# Patient Record
Sex: FEMALE | Race: WHITE | NOT HISPANIC OR LATINO
[De-identification: names, ages, dates, MRNs, and addresses within clinical notes are randomized per-mention and may not be internally consistent; named-entity substitution may affect disease eponyms.]

---

## 2019-03-26 PROBLEM — Z00.00 ENCOUNTER FOR PREVENTIVE HEALTH EXAMINATION: Status: ACTIVE | Noted: 2019-03-26

## 2019-04-09 ENCOUNTER — APPOINTMENT (OUTPATIENT)
Dept: INTERNAL MEDICINE | Facility: CLINIC | Age: 24
End: 2019-04-09
Payer: COMMERCIAL

## 2019-04-09 VITALS
DIASTOLIC BLOOD PRESSURE: 80 MMHG | SYSTOLIC BLOOD PRESSURE: 110 MMHG | OXYGEN SATURATION: 98 % | HEIGHT: 63 IN | HEART RATE: 86 BPM | WEIGHT: 190 LBS | BODY MASS INDEX: 33.66 KG/M2 | TEMPERATURE: 98.6 F | RESPIRATION RATE: 14 BRPM

## 2019-04-09 DIAGNOSIS — Z87.19 PERSONAL HISTORY OF OTHER DISEASES OF THE DIGESTIVE SYSTEM: ICD-10-CM

## 2019-04-09 DIAGNOSIS — Z86.59 PERSONAL HISTORY OF OTHER MENTAL AND BEHAVIORAL DISORDERS: ICD-10-CM

## 2019-04-09 PROCEDURE — 99204 OFFICE O/P NEW MOD 45 MIN: CPT

## 2019-04-09 NOTE — REVIEW OF SYSTEMS
[Insomnia] : insomnia [Suicidal] : not suicidal [Anxiety] : anxiety [Depression] : depression [Negative] : Heme/Lymph

## 2019-04-09 NOTE — PHYSICAL EXAM
[No Acute Distress] : no acute distress [Normal Sclera/Conjunctiva] : normal sclera/conjunctiva [Well-Appearing] : well-appearing [Well Developed] : well developed [Well Nourished] : well nourished [PERRL] : pupils equal round and reactive to light [EOMI] : extraocular movements intact [Normal Outer Ear/Nose] : the outer ears and nose were normal in appearance [Normal Oropharynx] : the oropharynx was normal [No JVD] : no jugular venous distention [Supple] : supple [No Respiratory Distress] : no respiratory distress  [No Lymphadenopathy] : no lymphadenopathy [Thyroid Normal, No Nodules] : the thyroid was normal and there were no nodules present [Clear to Auscultation] : lungs were clear to auscultation bilaterally [No Accessory Muscle Use] : no accessory muscle use [Normal Rate] : normal rate  [Regular Rhythm] : with a regular rhythm [Normal S1, S2] : normal S1 and S2 [No Murmur] : no murmur heard [No Carotid Bruits] : no carotid bruits [No Abdominal Bruit] : a ~M bruit was not heard ~T in the abdomen [No Varicosities] : no varicosities [No Edema] : there was no peripheral edema [Pedal Pulses Present] : the pedal pulses are present [No Extremity Clubbing/Cyanosis] : no extremity clubbing/cyanosis [Soft] : abdomen soft [No Palpable Aorta] : no palpable aorta [Non Tender] : non-tender [Non-distended] : non-distended [No Masses] : no abdominal mass palpated [Normal Bowel Sounds] : normal bowel sounds [No HSM] : no HSM [No CVA Tenderness] : no CVA  tenderness [Normal Anterior Cervical Nodes] : no anterior cervical lymphadenopathy [Normal Posterior Cervical Nodes] : no posterior cervical lymphadenopathy [Grossly Normal Strength/Tone] : grossly normal strength/tone [No Joint Swelling] : no joint swelling [No Spinal Tenderness] : no spinal tenderness [No Rash] : no rash [Normal Gait] : normal gait [Coordination Grossly Intact] : coordination grossly intact [Deep Tendon Reflexes (DTR)] : deep tendon reflexes were 2+ and symmetric [No Focal Deficits] : no focal deficits [Normal Insight/Judgement] : insight and judgment were intact [Normal Affect] : the affect was normal

## 2019-04-09 NOTE — HEALTH RISK ASSESSMENT
[Fair] : ~his/her~ current health as fair  [1] : 1) Little interest or pleasure doing things for several days (1)

## 2019-04-10 ENCOUNTER — APPOINTMENT (OUTPATIENT)
Dept: INTERNAL MEDICINE | Facility: CLINIC | Age: 24
End: 2019-04-10

## 2019-04-11 LAB
ALBUMIN SERPL ELPH-MCNC: 4.9 G/DL
ALP BLD-CCNC: 53 U/L
ALT SERPL-CCNC: 9 U/L
ANION GAP SERPL CALC-SCNC: 11 MMOL/L
AST SERPL-CCNC: 14 U/L
BASOPHILS # BLD AUTO: 0.04 K/UL
BASOPHILS NFR BLD AUTO: 0.4 %
BILIRUB SERPL-MCNC: 0.4 MG/DL
BUN SERPL-MCNC: 15 MG/DL
CALCIUM SERPL-MCNC: 10.1 MG/DL
CHLORIDE SERPL-SCNC: 103 MMOL/L
CO2 SERPL-SCNC: 26 MMOL/L
CREAT SERPL-MCNC: 0.79 MG/DL
EOSINOPHIL # BLD AUTO: 0.08 K/UL
EOSINOPHIL NFR BLD AUTO: 0.7 %
GLUCOSE SERPL-MCNC: 85 MG/DL
HCT VFR BLD CALC: 44.1 %
HGB BLD-MCNC: 14.3 G/DL
IMM GRANULOCYTES NFR BLD AUTO: 0.2 %
LYMPHOCYTES # BLD AUTO: 1.9 K/UL
LYMPHOCYTES NFR BLD AUTO: 17.7 %
MAN DIFF?: NORMAL
MCHC RBC-ENTMCNC: 31.3 PG
MCHC RBC-ENTMCNC: 32.4 GM/DL
MCV RBC AUTO: 96.5 FL
MONOCYTES # BLD AUTO: 0.58 K/UL
MONOCYTES NFR BLD AUTO: 5.4 %
NEUTROPHILS # BLD AUTO: 8.11 K/UL
NEUTROPHILS NFR BLD AUTO: 75.6 %
PLATELET # BLD AUTO: 373 K/UL
POTASSIUM SERPL-SCNC: 4.7 MMOL/L
PROT SERPL-MCNC: 7.2 G/DL
RBC # BLD: 4.57 M/UL
RBC # FLD: 12.5 %
SODIUM SERPL-SCNC: 140 MMOL/L
TSH SERPL-ACNC: 1.03 UIU/ML
WBC # FLD AUTO: 10.73 K/UL

## 2019-04-22 ENCOUNTER — APPOINTMENT (OUTPATIENT)
Dept: INTERNAL MEDICINE | Facility: CLINIC | Age: 24
End: 2019-04-22

## 2019-05-07 ENCOUNTER — APPOINTMENT (OUTPATIENT)
Dept: INTERNAL MEDICINE | Facility: CLINIC | Age: 24
End: 2019-05-07

## 2019-05-21 ENCOUNTER — APPOINTMENT (OUTPATIENT)
Dept: INTERNAL MEDICINE | Facility: CLINIC | Age: 24
End: 2019-05-21

## 2019-06-05 ENCOUNTER — APPOINTMENT (OUTPATIENT)
Dept: ORTHOPEDIC SURGERY | Facility: CLINIC | Age: 24
End: 2019-06-05
Payer: COMMERCIAL

## 2019-06-05 VITALS
WEIGHT: 185 LBS | SYSTOLIC BLOOD PRESSURE: 109 MMHG | HEART RATE: 82 BPM | HEIGHT: 63 IN | BODY MASS INDEX: 32.78 KG/M2 | DIASTOLIC BLOOD PRESSURE: 72 MMHG

## 2019-06-05 DIAGNOSIS — Z87.39 PERSONAL HISTORY OF OTHER DISEASES OF THE MUSCULOSKELETAL SYSTEM AND CONNECTIVE TISSUE: ICD-10-CM

## 2019-06-05 DIAGNOSIS — Z72.3 LACK OF PHYSICAL EXERCISE: ICD-10-CM

## 2019-06-05 DIAGNOSIS — Z80.7 FAMILY HISTORY OF OTHER MALIGNANT NEOPLASMS OF LYMPHOID, HEMATOPOIETIC AND RELATED TISSUES: ICD-10-CM

## 2019-06-05 PROCEDURE — 99203 OFFICE O/P NEW LOW 30 MIN: CPT

## 2019-06-05 RX ORDER — OXYCODONE HYDROCHLORIDE AND ACETAMINOPHEN 10; 325 MG/1; MG/1
TABLET ORAL
Refills: 0 | Status: ACTIVE | COMMUNITY

## 2019-08-21 ENCOUNTER — APPOINTMENT (OUTPATIENT)
Dept: INTERNAL MEDICINE | Facility: CLINIC | Age: 24
End: 2019-08-21
Payer: COMMERCIAL

## 2019-08-21 VITALS — DIASTOLIC BLOOD PRESSURE: 80 MMHG | SYSTOLIC BLOOD PRESSURE: 122 MMHG

## 2019-08-21 VITALS
SYSTOLIC BLOOD PRESSURE: 140 MMHG | WEIGHT: 195 LBS | OXYGEN SATURATION: 97 % | HEART RATE: 75 BPM | TEMPERATURE: 98.5 F | DIASTOLIC BLOOD PRESSURE: 100 MMHG

## 2019-08-21 DIAGNOSIS — Z01.818 ENCOUNTER FOR OTHER PREPROCEDURAL EXAMINATION: ICD-10-CM

## 2019-08-21 PROCEDURE — 99214 OFFICE O/P EST MOD 30 MIN: CPT

## 2019-08-21 NOTE — PHYSICAL EXAM
[No Acute Distress] : no acute distress [Well Nourished] : well nourished [Well Developed] : well developed [Well-Appearing] : well-appearing [PERRL] : pupils equal round and reactive to light [Normal Sclera/Conjunctiva] : normal sclera/conjunctiva [EOMI] : extraocular movements intact [Normal Outer Ear/Nose] : the outer ears and nose were normal in appearance [Normal Oropharynx] : the oropharynx was normal [No JVD] : no jugular venous distention [No Lymphadenopathy] : no lymphadenopathy [Supple] : supple [Thyroid Normal, No Nodules] : the thyroid was normal and there were no nodules present [No Accessory Muscle Use] : no accessory muscle use [No Respiratory Distress] : no respiratory distress  [Clear to Auscultation] : lungs were clear to auscultation bilaterally [Normal Rate] : normal rate  [Regular Rhythm] : with a regular rhythm [Normal S1, S2] : normal S1 and S2 [No Murmur] : no murmur heard [No Carotid Bruits] : no carotid bruits [No Varicosities] : no varicosities [No Abdominal Bruit] : a ~M bruit was not heard ~T in the abdomen [Pedal Pulses Present] : the pedal pulses are present [No Edema] : there was no peripheral edema [No Palpable Aorta] : no palpable aorta [No Extremity Clubbing/Cyanosis] : no extremity clubbing/cyanosis [Soft] : abdomen soft [Non Tender] : non-tender [Non-distended] : non-distended [No Masses] : no abdominal mass palpated [No HSM] : no HSM [Normal Bowel Sounds] : normal bowel sounds [Normal Posterior Cervical Nodes] : no posterior cervical lymphadenopathy [Normal Anterior Cervical Nodes] : no anterior cervical lymphadenopathy [No CVA Tenderness] : no CVA  tenderness [No Spinal Tenderness] : no spinal tenderness [No Joint Swelling] : no joint swelling [Grossly Normal Strength/Tone] : grossly normal strength/tone [No Rash] : no rash [Coordination Grossly Intact] : coordination grossly intact [No Focal Deficits] : no focal deficits [Normal Gait] : normal gait [Deep Tendon Reflexes (DTR)] : deep tendon reflexes were 2+ and symmetric [Normal Affect] : the affect was normal [Normal Insight/Judgement] : insight and judgment were intact

## 2019-08-23 LAB
BASOPHILS # BLD AUTO: 0.06 K/UL
BASOPHILS NFR BLD AUTO: 0.5 %
EOSINOPHIL # BLD AUTO: 0.11 K/UL
EOSINOPHIL NFR BLD AUTO: 0.9 %
HCT VFR BLD CALC: 39 %
HGB BLD-MCNC: 13.4 G/DL
IMM GRANULOCYTES NFR BLD AUTO: 0.5 %
LYMPHOCYTES # BLD AUTO: 2.98 K/UL
LYMPHOCYTES NFR BLD AUTO: 24.4 %
MAN DIFF?: NORMAL
MCHC RBC-ENTMCNC: 31.3 PG
MCHC RBC-ENTMCNC: 34.4 GM/DL
MCV RBC AUTO: 91.1 FL
MONOCYTES # BLD AUTO: 0.59 K/UL
MONOCYTES NFR BLD AUTO: 4.8 %
NEUTROPHILS # BLD AUTO: 8.41 K/UL
NEUTROPHILS NFR BLD AUTO: 68.9 %
PLATELET # BLD AUTO: 361 K/UL
RBC # BLD: 4.28 M/UL
RBC # FLD: 12.1 %
WBC # FLD AUTO: 12.21 K/UL

## 2019-08-23 NOTE — ADDENDUM
[FreeTextEntry1] : Repeat CBC- WBC 12.2. Pt admits to receiving steroids recently for allergic rxn. WBC now improving. Patient cleared for surgery.

## 2019-08-23 NOTE — HISTORY OF PRESENT ILLNESS
[FreeTextEntry1] : Laparoscopic and diagnostic and robotic resection [FreeTextEntry2] : 8/27/19 [FreeTextEntry4] : Pt undergoing work up for Hiss- followed by cardiology. \par No chest pain palpitation sob\par Overall feels well.  [FreeTextEntry3] : Dr. Coyle

## 2019-08-23 NOTE — ASSESSMENT
[Patient Optimized for Surgery] : Patient optimized for surgery [FreeTextEntry4] : Pt with elevated WBC on preop. Admits to receiving steroids recently due to allergic reaction. \par Pt to get repeat CBC.\par Pt to get cardiac clearance

## 2019-11-07 ENCOUNTER — OUTPATIENT (OUTPATIENT)
Dept: OUTPATIENT SERVICES | Facility: HOSPITAL | Age: 24
LOS: 1 days | Discharge: HOME | End: 2019-11-07

## 2019-11-07 DIAGNOSIS — D72.89 OTHER SPECIFIED DISORDERS OF WHITE BLOOD CELLS: ICD-10-CM

## 2019-11-07 DIAGNOSIS — Z98.89 OTHER SPECIFIED POSTPROCEDURAL STATES: Chronic | ICD-10-CM

## 2019-11-12 ENCOUNTER — APPOINTMENT (OUTPATIENT)
Dept: SURGICAL ONCOLOGY | Facility: CLINIC | Age: 24
End: 2019-11-12
Payer: COMMERCIAL

## 2019-11-12 VITALS
SYSTOLIC BLOOD PRESSURE: 125 MMHG | TEMPERATURE: 98.7 F | HEIGHT: 63 IN | OXYGEN SATURATION: 99 % | BODY MASS INDEX: 36.32 KG/M2 | DIASTOLIC BLOOD PRESSURE: 91 MMHG | HEART RATE: 90 BPM | WEIGHT: 205 LBS | RESPIRATION RATE: 18 BRPM

## 2019-11-12 DIAGNOSIS — D72.829 ELEVATED WHITE BLOOD CELL COUNT, UNSPECIFIED: ICD-10-CM

## 2019-11-12 PROCEDURE — 99244 OFF/OP CNSLTJ NEW/EST MOD 40: CPT

## 2019-11-13 PROBLEM — D72.829 ELEVATED WBC COUNT: Status: ACTIVE | Noted: 2019-08-22

## 2019-11-14 NOTE — HISTORY OF PRESENT ILLNESS
[de-identified] : 24 year old woman with constellation of symptoms, ranging from back pain, joint pain, fatigue, vomiting, nausea, swelling, weight gain, to name a few.  She also states she has fibromyalgia.\par She was recently in the ED at [facility], had a CT scan, which was unremarkable but she was referred to oncologist for evaluation of swelling ?lymphadenopathy?\par She has no evidence of lymphadenopathy.\par \par

## 2019-11-14 NOTE — ASSESSMENT
[FreeTextEntry1] : 24 year old woman with wide constellation of symptoms, unexplained by any one diagnosis, however worthy of further workup.  These symptoms sound like they could be consistent with an autoimmune or endocrine etiology.\par She is currently undergoing an endocrine workup but does not have the result of her thyroid function tests.\par \par Plan:\par 1) Referral to PMD that can lead / orchestrate a full medical workup \par 2) Endocrine / Rheum input\par 3) RTO as needed

## 2019-11-25 ENCOUNTER — APPOINTMENT (OUTPATIENT)
Dept: HEMATOLOGY ONCOLOGY | Facility: CLINIC | Age: 24
End: 2019-11-25

## 2019-11-27 ENCOUNTER — APPOINTMENT (OUTPATIENT)
Dept: PULMONOLOGY | Facility: CLINIC | Age: 24
End: 2019-11-27
Payer: COMMERCIAL

## 2019-11-27 VITALS
HEIGHT: 60.5 IN | DIASTOLIC BLOOD PRESSURE: 78 MMHG | OXYGEN SATURATION: 98 % | WEIGHT: 210 LBS | SYSTOLIC BLOOD PRESSURE: 130 MMHG | HEART RATE: 99 BPM | BODY MASS INDEX: 40.16 KG/M2

## 2019-11-27 DIAGNOSIS — R53.83 OTHER FATIGUE: ICD-10-CM

## 2019-11-27 DIAGNOSIS — J98.11 ATELECTASIS: ICD-10-CM

## 2019-11-27 DIAGNOSIS — Z86.2 PERSONAL HISTORY OF DISEASES OF THE BLOOD AND BLOOD-FORMING ORGANS AND CERTAIN DISORDERS INVOLVING THE IMMUNE MECHANISM: ICD-10-CM

## 2019-11-27 DIAGNOSIS — Z82.49 FAMILY HISTORY OF ISCHEMIC HEART DISEASE AND OTHER DISEASES OF THE CIRCULATORY SYSTEM: ICD-10-CM

## 2019-11-27 DIAGNOSIS — R93.89 ABNORMAL FINDINGS ON DIAGNOSTIC IMAGING OF OTHER SPECIFIED BODY STRUCTURES: ICD-10-CM

## 2019-11-27 DIAGNOSIS — Z87.42 PERSONAL HISTORY OF OTHER DISEASES OF THE FEMALE GENITAL TRACT: ICD-10-CM

## 2019-11-27 DIAGNOSIS — G47.33 OBSTRUCTIVE SLEEP APNEA (ADULT) (PEDIATRIC): ICD-10-CM

## 2019-11-27 DIAGNOSIS — E66.01 MORBID (SEVERE) OBESITY DUE TO EXCESS CALORIES: ICD-10-CM

## 2019-11-27 PROCEDURE — 85018 HEMOGLOBIN: CPT | Mod: QW

## 2019-11-27 PROCEDURE — 94727 GAS DIL/WSHOT DETER LNG VOL: CPT

## 2019-11-27 PROCEDURE — 99406 BEHAV CHNG SMOKING 3-10 MIN: CPT

## 2019-11-27 PROCEDURE — 94010 BREATHING CAPACITY TEST: CPT

## 2019-11-27 PROCEDURE — 94729 DIFFUSING CAPACITY: CPT

## 2019-11-27 PROCEDURE — 99205 OFFICE O/P NEW HI 60 MIN: CPT | Mod: 25

## 2019-11-27 NOTE — CONSULT LETTER
[Dear  ___] : Dear  [unfilled], [Consult Letter:] : I had the pleasure of evaluating your patient, [unfilled]. [Please see my note below.] : Please see my note below. [Consult Closing:] : Thank you very much for allowing me to participate in the care of this patient.  If you have any questions, please do not hesitate to contact me. [Sincerely,] : Sincerely, [FreeTextEntry3] : Roberth Wang MD, FCCP, D. ABSM\par Pulmonary and Sleep Medicine\par [facility] [place]

## 2019-11-27 NOTE — REVIEW OF SYSTEMS
[Nasal Congestion] : nasal congestion [Postnasal Drip] : postnasal drip [Sinus Problems] : sinus problems [Palpitations] : palpitations [Reflux] : reflux [Nausea] : nausea [Anxiety] : anxiety [As Noted in HPI] : as noted in HPI [Fever] : no fever [Chills] : no chills [Dry Eyes] : no dryness of the eyes [Eye Irritation] : no ~T irritation of the eyes [Epistaxis] : no nosebleeds [Cough] : no cough [Sputum] : not coughing up ~M sputum [Dyspnea] : no dyspnea [Chest Tightness] : no chest tightness [Pleuritic Pain] : no pleuritic pain [Wheezing] : no wheezing [Hypertension] : no ~T hypertension [Chest Discomfort] : no chest discomfort [Dysrhythmia] : no dysrhythmia [Murmurs] : no murmurs were heard [Edema] : ~T edema was not present [Hay Fever] : no hay fever [Itchy Eyes] : no itching of ~T the eyes [Vomiting] : no vomiting [Constipation] : no constipation [Diarrhea] : no diarrhea [Abdominal Pain] : no abdominal pain [Dysuria] : no dysuria [Trauma] : no ~T physical trauma [Fracture] : no fracture [Anemia] : no anemia [Headache] : no headache [Dizziness] : no dizziness [Syncope] : no fainting [Numbness] : no numbness [Paralysis] : no paralysis was seen [Seizures] : no seizures [Depression] : no depression [Diabetes] : no diabetes mellitus [Thyroid Problem] : no thyroid problem

## 2019-11-27 NOTE — HISTORY OF PRESENT ILLNESS
[Excess Weight] : excess weight [Dyspnea] : dyspnea [Knee Pain] : knee pain [Back Pain] : back pain [Joint Pain] : joint pain [Low Calorie Diet] : low calorie diet [Fair Compliance] : fair compliance with treatment [Fair Tolerance] : fair tolerance of treatment [Poor Symptom Control] : poor symptom control [Dyslipidemia] : dyslipidemia [High] : high [Low Calorie] : low calorie [Well Balanced Diet] : well balanced meals [Initial Evaluation] : an initial evaluation of [Excessive Daytime Sleepiness] : excessive daytime sleepiness [Snoring] : snoring [Unrefreshing Sleep] : unrefreshing sleep [Sleepy When Sedentary] : sleepy when sedentary [Currently Experiencing] : The patient is currently experiencing symptoms. [None] : The patient is not currently being treated for this problem [FreeTextEntry1] : Pt with h/o GERD and is followed with GI but is not on medication.\par She is smoker of 3-4 cigarettes daily since 2013 but continues with marijuana use and was "vaping" but no longer. I spent > 3 min discussing the risks of smoking and the benefits and therapy for smoking cessation including nicotine replacement, medications, and programs.\par She presents with SOBOE and atypical chest discomfort. She also reports significant anxiety.\par She has a h/o endometriosis and has had a 60 lb weight gain over the past 7 months, even before uterine surgery. [On ___] : performed on [unfilled] [Patient] : the patient [To Assess ___] : to assess [unfilled] [FreeTextEntry9] : Abd CT [FreeTextEntry8] : ? mild atelectasis [FreeTextEntry2] : SOBOE

## 2019-11-27 NOTE — REASON FOR VISIT
[Initial Evaluation] : an initial evaluation [Abnormal CT Scan] : abnormal CT Scan [Shortness of Breath] : shortness of Breath [FreeTextEntry2] : morbid obesity, atelectasis, smoking hx

## 2019-11-27 NOTE — DISCUSSION/SUMMARY
[FreeTextEntry1] : \par #1. PFTs performed today are essentially normal.\par #2. The patient does not appear to require chronic BD therapy at this time\par #3. SOBOE is likely related to weight or deconditioning given normal PFTs\par #4. Diet and exercise for weight loss\par #5. PSG to evaluate for possible NIKKI\par #6. Chest CT to evaluate possible bronchiectasis\par #7. If CT and PSG are unremarkable, no further pulm/sleep w/u would be required\par #8. Control of anxiety would be helpful\par #9. F/u after PSG and with chest CT

## 2020-02-24 ENCOUNTER — TRANSCRIPTION ENCOUNTER (OUTPATIENT)
Age: 25
End: 2020-02-24

## 2020-03-22 ENCOUNTER — TRANSCRIPTION ENCOUNTER (OUTPATIENT)
Age: 25
End: 2020-03-22

## 2020-04-12 ENCOUNTER — TRANSCRIPTION ENCOUNTER (OUTPATIENT)
Age: 25
End: 2020-04-12

## 2020-05-28 ENCOUNTER — FORM ENCOUNTER (OUTPATIENT)
Age: 25
End: 2020-05-28

## 2020-06-01 ENCOUNTER — TRANSCRIPTION ENCOUNTER (OUTPATIENT)
Age: 25
End: 2020-06-01

## 2020-06-03 ENCOUNTER — TRANSCRIPTION ENCOUNTER (OUTPATIENT)
Age: 25
End: 2020-06-03

## 2020-06-09 ENCOUNTER — APPOINTMENT (OUTPATIENT)
Dept: ORTHOPEDIC SURGERY | Facility: CLINIC | Age: 25
End: 2020-06-09
Payer: COMMERCIAL

## 2020-06-09 VITALS
WEIGHT: 220 LBS | SYSTOLIC BLOOD PRESSURE: 145 MMHG | DIASTOLIC BLOOD PRESSURE: 93 MMHG | BODY MASS INDEX: 43.19 KG/M2 | HEART RATE: 93 BPM | HEIGHT: 60 IN

## 2020-06-09 DIAGNOSIS — M25.852 OTHER SPECIFIED JOINT DISORDERS, LEFT HIP: ICD-10-CM

## 2020-06-09 DIAGNOSIS — S73.192A OTHER SPRAIN OF LEFT HIP, INITIAL ENCOUNTER: ICD-10-CM

## 2020-06-09 PROCEDURE — 99213 OFFICE O/P EST LOW 20 MIN: CPT

## 2020-06-09 PROCEDURE — 73502 X-RAY EXAM HIP UNI 2-3 VIEWS: CPT | Mod: LT

## 2020-06-09 NOTE — DISCUSSION/SUMMARY
[de-identified] : 24-year-old obese female smoker with low back pain with radicular symptoms she does have some pain with hip motion but it is more likely that her pain is coming from her back and her hip. I recommended diagnostic intra-articular injection to differentiate her hip from her back pain. She will follow up after injection. All questions were answered.

## 2020-06-09 NOTE — HISTORY OF PRESENT ILLNESS
[de-identified] : 25yo obese female returns complaining of chronic left hip and leg pain. This is gradually worsening over the last couple of months. Past medical history of fibromyalgia, multiple lumbar disc herniations. She states she has been on opioids therapy for the last 2 years for her lower back. She is being treated by Dr. Velazco, has had epidurals with some mild temporary relief.  She has pain that radiates down her entire left leg to her foot with numbness and tingling. She states she also has localized pain to the lateral anterior aspect worse with ambulation. She's not sure if this is related to her lower back or her hip. She had MRI at [facility].  \par \par The patient's past medical history, past surgical history, medications, allergies, and social history were reviewed by me today with the patient and documented accordingly. In addition, the patient's family history, which is noncontributory to this visit, was also reviewed.

## 2020-06-09 NOTE — PHYSICAL EXAM
[de-identified] : General Exam\par \par Well developed, well nourished\par No apparent distress\par Oriented to person, place, and time\par Mood: Normal\par Affect: Normal\par Balance and coordination: Normal\par Gait: Normal\par \par Left hip exam\par \par Skin: Clean/dry and intact\par Inspection: No obvious deformity, no swelling, no ecchymosis.\par Tenderness: + tenderness over greater trochanter/glut medius insertion. No tenderness pubic symphysis, pubic tubercle, hip flexors. No ttp ischial tuberosity or buttock. No ttp over the ASIS/Illiac crest.\par ROM: 0-120°. Internal rotation 30 external rotation 70\par Painful ROM: None\par Additional tests: No pain with circumduction negative impingement test at 90° +positive impingement test at 60° +Noel negative [facility]\par Strength: 5/5 hip flexion/ADD/ABD/Q/H/TA/GS/EHL\par Neuro: Sensation in tact to light touch throughout in dp/sp/tib/nasra/saph distributions\par Pulses: 2+ DP/PT pulses\par  [de-identified] : \par The following radiographs were ordered and read by me during this patients visit. I reviewed each radiograph in detail with the patient and discussed the findings as highlighted below. \par AP pelvis AP lateral left hip were obtained today. Joint spaces are well maintained. There is a small osseous convexity of the femoral head neck junction\par \par MRI was reviewed. Small cam deformity labral fraying\par \par

## 2020-07-10 ENCOUNTER — TRANSCRIPTION ENCOUNTER (OUTPATIENT)
Age: 25
End: 2020-07-10

## 2020-08-15 ENCOUNTER — TRANSCRIPTION ENCOUNTER (OUTPATIENT)
Age: 25
End: 2020-08-15

## 2020-09-04 ENCOUNTER — APPOINTMENT (OUTPATIENT)
Dept: SPINE | Facility: CLINIC | Age: 25
End: 2020-09-04
Payer: COMMERCIAL

## 2020-09-04 ENCOUNTER — INPATIENT (INPATIENT)
Facility: HOSPITAL | Age: 25
LOS: 5 days | Discharge: OTHER INSTITUTION | DRG: 552 | End: 2020-09-10
Attending: INTERNAL MEDICINE | Admitting: INTERNAL MEDICINE
Payer: COMMERCIAL

## 2020-09-04 VITALS
RESPIRATION RATE: 20 BRPM | TEMPERATURE: 99 F | OXYGEN SATURATION: 98 % | DIASTOLIC BLOOD PRESSURE: 86 MMHG | SYSTOLIC BLOOD PRESSURE: 123 MMHG | WEIGHT: 214.95 LBS | HEART RATE: 92 BPM | HEIGHT: 62 IN

## 2020-09-04 VITALS
SYSTOLIC BLOOD PRESSURE: 128 MMHG | HEIGHT: 62 IN | BODY MASS INDEX: 39.56 KG/M2 | HEART RATE: 80 BPM | WEIGHT: 215 LBS | DIASTOLIC BLOOD PRESSURE: 85 MMHG | TEMPERATURE: 97.3 F | OXYGEN SATURATION: 97 %

## 2020-09-04 DIAGNOSIS — Z78.9 OTHER SPECIFIED HEALTH STATUS: ICD-10-CM

## 2020-09-04 DIAGNOSIS — F17.200 NICOTINE DEPENDENCE, UNSPECIFIED, UNCOMPLICATED: ICD-10-CM

## 2020-09-04 DIAGNOSIS — K21.9 GASTRO-ESOPHAGEAL REFLUX DISEASE WITHOUT ESOPHAGITIS: ICD-10-CM

## 2020-09-04 DIAGNOSIS — F19.90 OTHER PSYCHOACTIVE SUBSTANCE USE, UNSPECIFIED, UNCOMPLICATED: ICD-10-CM

## 2020-09-04 DIAGNOSIS — Z98.89 OTHER SPECIFIED POSTPROCEDURAL STATES: Chronic | ICD-10-CM

## 2020-09-04 DIAGNOSIS — F17.210 NICOTINE DEPENDENCE, CIGARETTES, UNCOMPLICATED: ICD-10-CM

## 2020-09-04 DIAGNOSIS — Z87.891 PERSONAL HISTORY OF NICOTINE DEPENDENCE: ICD-10-CM

## 2020-09-04 DIAGNOSIS — Z87.898 PERSONAL HISTORY OF OTHER SPECIFIED CONDITIONS: ICD-10-CM

## 2020-09-04 DIAGNOSIS — M54.5 LOW BACK PAIN: ICD-10-CM

## 2020-09-04 LAB
ALBUMIN SERPL ELPH-MCNC: 4.7 G/DL — SIGNIFICANT CHANGE UP (ref 3.3–5)
ALP SERPL-CCNC: 56 U/L — SIGNIFICANT CHANGE UP (ref 40–120)
ALT FLD-CCNC: 15 U/L — SIGNIFICANT CHANGE UP (ref 10–45)
ANION GAP SERPL CALC-SCNC: 14 MMOL/L — SIGNIFICANT CHANGE UP (ref 5–17)
APPEARANCE UR: ABNORMAL
APTT BLD: 38 SEC — HIGH (ref 27.5–35.5)
AST SERPL-CCNC: 21 U/L — SIGNIFICANT CHANGE UP (ref 10–40)
BACTERIA # UR AUTO: ABNORMAL
BASOPHILS # BLD AUTO: 0.04 K/UL — SIGNIFICANT CHANGE UP (ref 0–0.2)
BASOPHILS NFR BLD AUTO: 0.4 % — SIGNIFICANT CHANGE UP (ref 0–2)
BILIRUB SERPL-MCNC: 0.4 MG/DL — SIGNIFICANT CHANGE UP (ref 0.2–1.2)
BILIRUB UR-MCNC: NEGATIVE — SIGNIFICANT CHANGE UP
BLD GP AB SCN SERPL QL: NEGATIVE — SIGNIFICANT CHANGE UP
BUN SERPL-MCNC: 14 MG/DL — SIGNIFICANT CHANGE UP (ref 7–23)
CALCIUM SERPL-MCNC: 10.3 MG/DL — SIGNIFICANT CHANGE UP (ref 8.4–10.5)
CHLORIDE SERPL-SCNC: 100 MMOL/L — SIGNIFICANT CHANGE UP (ref 96–108)
CO2 SERPL-SCNC: 22 MMOL/L — SIGNIFICANT CHANGE UP (ref 22–31)
COLOR SPEC: SIGNIFICANT CHANGE UP
CREAT SERPL-MCNC: 0.55 MG/DL — SIGNIFICANT CHANGE UP (ref 0.5–1.3)
DIFF PNL FLD: ABNORMAL
EOSINOPHIL # BLD AUTO: 0.07 K/UL — SIGNIFICANT CHANGE UP (ref 0–0.5)
EOSINOPHIL NFR BLD AUTO: 0.7 % — SIGNIFICANT CHANGE UP (ref 0–6)
EPI CELLS # UR: 17 — SIGNIFICANT CHANGE UP
GLUCOSE SERPL-MCNC: 88 MG/DL — SIGNIFICANT CHANGE UP (ref 70–99)
GLUCOSE UR QL: NEGATIVE — SIGNIFICANT CHANGE UP
HCG SERPL-ACNC: <2 MIU/ML — SIGNIFICANT CHANGE UP
HCT VFR BLD CALC: 40.7 % — SIGNIFICANT CHANGE UP (ref 34.5–45)
HGB BLD-MCNC: 13.8 G/DL — SIGNIFICANT CHANGE UP (ref 11.5–15.5)
HYALINE CASTS # UR AUTO: 1 /LPF — SIGNIFICANT CHANGE UP (ref 0–7)
IMM GRANULOCYTES NFR BLD AUTO: 0.4 % — SIGNIFICANT CHANGE UP (ref 0–1.5)
INR BLD: 0.96 RATIO — SIGNIFICANT CHANGE UP (ref 0.88–1.16)
KETONES UR-MCNC: NEGATIVE — SIGNIFICANT CHANGE UP
LEUKOCYTE ESTERASE UR-ACNC: ABNORMAL
LYMPHOCYTES # BLD AUTO: 1.96 K/UL — SIGNIFICANT CHANGE UP (ref 1–3.3)
LYMPHOCYTES # BLD AUTO: 20.7 % — SIGNIFICANT CHANGE UP (ref 13–44)
MCHC RBC-ENTMCNC: 30.9 PG — SIGNIFICANT CHANGE UP (ref 27–34)
MCHC RBC-ENTMCNC: 33.9 GM/DL — SIGNIFICANT CHANGE UP (ref 32–36)
MCV RBC AUTO: 91.3 FL — SIGNIFICANT CHANGE UP (ref 80–100)
MONOCYTES # BLD AUTO: 0.54 K/UL — SIGNIFICANT CHANGE UP (ref 0–0.9)
MONOCYTES NFR BLD AUTO: 5.7 % — SIGNIFICANT CHANGE UP (ref 2–14)
NEUTROPHILS # BLD AUTO: 6.83 K/UL — SIGNIFICANT CHANGE UP (ref 1.8–7.4)
NEUTROPHILS NFR BLD AUTO: 72.1 % — SIGNIFICANT CHANGE UP (ref 43–77)
NITRITE UR-MCNC: NEGATIVE — SIGNIFICANT CHANGE UP
NRBC # BLD: 0 /100 WBCS — SIGNIFICANT CHANGE UP (ref 0–0)
PH UR: 5.5 — SIGNIFICANT CHANGE UP (ref 5–8)
PLATELET # BLD AUTO: 325 K/UL — SIGNIFICANT CHANGE UP (ref 150–400)
POTASSIUM SERPL-MCNC: 4.5 MMOL/L — SIGNIFICANT CHANGE UP (ref 3.5–5.3)
POTASSIUM SERPL-SCNC: 4.5 MMOL/L — SIGNIFICANT CHANGE UP (ref 3.5–5.3)
PROT SERPL-MCNC: 7.6 G/DL — SIGNIFICANT CHANGE UP (ref 6–8.3)
PROT UR-MCNC: SIGNIFICANT CHANGE UP
PROTHROM AB SERPL-ACNC: 11.5 SEC — SIGNIFICANT CHANGE UP (ref 10.6–13.6)
RBC # BLD: 4.46 M/UL — SIGNIFICANT CHANGE UP (ref 3.8–5.2)
RBC # FLD: 12.2 % — SIGNIFICANT CHANGE UP (ref 10.3–14.5)
RBC CASTS # UR COMP ASSIST: 14 /HPF — HIGH (ref 0–4)
RH IG SCN BLD-IMP: POSITIVE — SIGNIFICANT CHANGE UP
SARS-COV-2 RNA SPEC QL NAA+PROBE: SIGNIFICANT CHANGE UP
SODIUM SERPL-SCNC: 136 MMOL/L — SIGNIFICANT CHANGE UP (ref 135–145)
SP GR SPEC: 1.02 — SIGNIFICANT CHANGE UP (ref 1.01–1.02)
UROBILINOGEN FLD QL: NEGATIVE — SIGNIFICANT CHANGE UP
WBC # BLD: 9.48 K/UL — SIGNIFICANT CHANGE UP (ref 3.8–10.5)
WBC # FLD AUTO: 9.48 K/UL — SIGNIFICANT CHANGE UP (ref 3.8–10.5)
WBC UR QL: 27 /HPF — HIGH (ref 0–5)

## 2020-09-04 PROCEDURE — 99203 OFFICE O/P NEW LOW 30 MIN: CPT

## 2020-09-04 PROCEDURE — 99285 EMERGENCY DEPT VISIT HI MDM: CPT

## 2020-09-04 PROCEDURE — 72148 MRI LUMBAR SPINE W/O DYE: CPT | Mod: 26

## 2020-09-04 PROCEDURE — 72131 CT LUMBAR SPINE W/O DYE: CPT | Mod: 26

## 2020-09-04 RX ORDER — GABAPENTIN 100 MG/1
CAPSULE ORAL
Refills: 0 | Status: ACTIVE | COMMUNITY

## 2020-09-04 RX ORDER — OXYCODONE HYDROCHLORIDE 5 MG/1
5 TABLET ORAL ONCE
Refills: 0 | Status: DISCONTINUED | OUTPATIENT
Start: 2020-09-04 | End: 2020-09-04

## 2020-09-04 RX ORDER — ACETAMINOPHEN 500 MG
650 TABLET ORAL ONCE
Refills: 0 | Status: COMPLETED | OUTPATIENT
Start: 2020-09-04 | End: 2020-09-04

## 2020-09-04 RX ORDER — GABAPENTIN 400 MG/1
300 CAPSULE ORAL ONCE
Refills: 0 | Status: COMPLETED | OUTPATIENT
Start: 2020-09-04 | End: 2020-09-04

## 2020-09-04 RX ORDER — HYDROMORPHONE HYDROCHLORIDE 2 MG/ML
0.5 INJECTION INTRAMUSCULAR; INTRAVENOUS; SUBCUTANEOUS ONCE
Refills: 0 | Status: DISCONTINUED | OUTPATIENT
Start: 2020-09-04 | End: 2020-09-04

## 2020-09-04 RX ORDER — MORPHINE SULFATE 50 MG/1
2 CAPSULE, EXTENDED RELEASE ORAL EVERY 4 HOURS
Refills: 0 | Status: DISCONTINUED | OUTPATIENT
Start: 2020-09-04 | End: 2020-09-05

## 2020-09-04 RX ORDER — OXYCODONE HYDROCHLORIDE 5 MG/1
10 TABLET ORAL ONCE
Refills: 0 | Status: DISCONTINUED | OUTPATIENT
Start: 2020-09-04 | End: 2020-09-04

## 2020-09-04 RX ORDER — PANTOPRAZOLE SODIUM 20 MG/1
40 TABLET, DELAYED RELEASE ORAL
Refills: 0 | Status: DISCONTINUED | OUTPATIENT
Start: 2020-09-04 | End: 2020-09-10

## 2020-09-04 RX ORDER — CYCLOBENZAPRINE HYDROCHLORIDE 10 MG/1
5 TABLET, FILM COATED ORAL ONCE
Refills: 0 | Status: COMPLETED | OUTPATIENT
Start: 2020-09-04 | End: 2020-09-04

## 2020-09-04 RX ORDER — ENOXAPARIN SODIUM 100 MG/ML
40 INJECTION SUBCUTANEOUS DAILY
Refills: 0 | Status: DISCONTINUED | OUTPATIENT
Start: 2020-09-04 | End: 2020-09-04

## 2020-09-04 RX ORDER — ALPRAZOLAM 0.25 MG
0.25 TABLET ORAL ONCE
Refills: 0 | Status: DISCONTINUED | OUTPATIENT
Start: 2020-09-04 | End: 2020-09-04

## 2020-09-04 RX ORDER — DEXAMETHASONE 0.5 MG/5ML
4 ELIXIR ORAL THREE TIMES A DAY
Refills: 0 | Status: DISCONTINUED | OUTPATIENT
Start: 2020-09-04 | End: 2020-09-05

## 2020-09-04 RX ORDER — OXYCODONE AND ACETAMINOPHEN 5; 325 MG/1; MG/1
2 TABLET ORAL EVERY 6 HOURS
Refills: 0 | Status: DISCONTINUED | OUTPATIENT
Start: 2020-09-04 | End: 2020-09-05

## 2020-09-04 RX ADMIN — OXYCODONE HYDROCHLORIDE 5 MILLIGRAM(S): 5 TABLET ORAL at 16:56

## 2020-09-04 RX ADMIN — Medication 1 MILLIGRAM(S): at 19:08

## 2020-09-04 RX ADMIN — CYCLOBENZAPRINE HYDROCHLORIDE 5 MILLIGRAM(S): 10 TABLET, FILM COATED ORAL at 17:09

## 2020-09-04 RX ADMIN — Medication 650 MILLIGRAM(S): at 13:38

## 2020-09-04 RX ADMIN — Medication 1 MILLIGRAM(S): at 15:02

## 2020-09-04 RX ADMIN — Medication 0.25 MILLIGRAM(S): at 23:01

## 2020-09-04 RX ADMIN — GABAPENTIN 300 MILLIGRAM(S): 400 CAPSULE ORAL at 17:14

## 2020-09-04 RX ADMIN — Medication 650 MILLIGRAM(S): at 16:56

## 2020-09-04 RX ADMIN — MORPHINE SULFATE 2 MILLIGRAM(S): 50 CAPSULE, EXTENDED RELEASE ORAL at 20:12

## 2020-09-04 RX ADMIN — HYDROMORPHONE HYDROCHLORIDE 0.5 MILLIGRAM(S): 2 INJECTION INTRAMUSCULAR; INTRAVENOUS; SUBCUTANEOUS at 21:36

## 2020-09-04 RX ADMIN — Medication 4 MILLIGRAM(S): at 21:39

## 2020-09-04 RX ADMIN — MORPHINE SULFATE 2 MILLIGRAM(S): 50 CAPSULE, EXTENDED RELEASE ORAL at 19:57

## 2020-09-04 RX ADMIN — HYDROMORPHONE HYDROCHLORIDE 0.5 MILLIGRAM(S): 2 INJECTION INTRAMUSCULAR; INTRAVENOUS; SUBCUTANEOUS at 21:51

## 2020-09-04 RX ADMIN — OXYCODONE HYDROCHLORIDE 5 MILLIGRAM(S): 5 TABLET ORAL at 17:09

## 2020-09-04 RX ADMIN — OXYCODONE HYDROCHLORIDE 5 MILLIGRAM(S): 5 TABLET ORAL at 13:38

## 2020-09-04 RX ADMIN — OXYCODONE HYDROCHLORIDE 10 MILLIGRAM(S): 5 TABLET ORAL at 17:50

## 2020-09-04 NOTE — PHYSICAL EXAM
[General Appearance - Alert] : alert [General Appearance - Well Nourished] : well nourished [General Appearance - In No Acute Distress] : in no acute distress [Oriented To Time, Place, And Person] : oriented to person, place, and time [General Appearance - Well Developed] : well developed [Affect] : the affect was normal [Impaired Insight] : insight and judgment were intact [Mood] : the mood was normal [Person] : oriented to person [Place] : oriented to place [Short Term Intact] : short term memory intact [Time] : oriented to time [Registration Intact] : recent registration memory intact [Remote Intact] : remote memory intact [Span Intact] : the attention span was normal [Concentration Intact] : normal concentrating ability [Visual Intact] : visual attention was ~T not ~L decreased [Comprehension] : comprehension intact [Fluency] : fluency intact [Current Events] : adequate knowledge of current events [Reading] : reading intact [Past History] : adequate knowledge of personal past history [Vocabulary] : adequate range of vocabulary [Cranial Nerves Optic (II)] : visual acuity intact bilaterally,  pupils equal round and reactive to light [Cranial Nerves Oculomotor (III)] : extraocular motion intact [Cranial Nerves Accessory (XI - Cranial And Spinal)] : head turning and shoulder shrug symmetric [Sensation Tactile Decrease] : light touch was intact [Involuntary Movements] : no involuntary movements were seen [Limited Balance] : the patient's balance was impaired [Coordination - Dysmetria Impaired Heel-to-Shin Bilateral] : present bilaterally [0] : Ankle jerk right 0 [___] : [unfilled] ~Ubeats present on the right [No Visual Abnormalities] : no visible abnormailities [Straight-Leg Raise Test - Left] : straight leg raise of the left leg was positive [Sclera] : the sclera and conjunctiva were normal [Neck Appearance] : the appearance of the neck was normal [Outer Ear] : the ears and nose were normal in appearance [] : no respiratory distress [Skin Color & Pigmentation] : normal skin color and pigmentation [___] : absent on the left [FreeTextEntry8] : Gait impaired due to pain , unable to perform heel to shin  [FreeTextEntry6] : poor effort of her strength exam and refused to have left leg examined [FreeTextEntry9] : obese  [Able to heel walk] : the patient was not able to heel walk [Straight-Leg Raise Test - Right] : straight leg raise  of the right leg was negative [Able to toe walk] : the patient was not able to toe walk

## 2020-09-04 NOTE — ED PROVIDER NOTE - PROGRESS NOTE DETAILS
Seen by NSG. Requesting MRI Lumbar spine. Spoke with Radiology, aware of order Initially went for MRI, but could not tolerate it due to muscle spasms.  She came back to the ED, she was given 1 mg of ativan and returned back to MRI.  Awaiting results and follow up with neurosurgery.  Maye Aguirre MD PEM Fellow Seen again by Nsg. Requesting dose of gabapentin, oxy and flexeril now. To staff with attending but likely plan for admission and OR **ATTENDING ADDENDUM (Dr. Felipe Edwards): informed by RN that patient is still in pain. ED course reviewed with ED team. Agree with goals/plan of ED care as described in EMR, including diagnostics, therapeutics and consultation as clinically warranted. Uncertain if narcotic analgesics will be effective in controlling patient's reported pain and anxiety. Concern over actual pain v. malingering v. pain refractory to normal and even supratherapeutic doses of analgesics re: dependency? Will independently reassess and continue to observe and monitor. PGY 1 Alverto Patel: Spoke w/ hospitalist and they accept pt for admission. PGY 1 Alverto Patel: Spoke w/ hospitalist and they accept pt for admission.  **ATTENDING ADDENDUM (Dr. Felipe Edwards): patient serially evaluated throughout ED course. NO acute deterioration up to this time in the ED. Agree with above notation by EM resident Jorge. Agree with admission for therapeutic intensity. Will continue to observe and monitor closely until definitive placement is made.

## 2020-09-04 NOTE — CONSULT NOTE ADULT - SUBJECTIVE AND OBJECTIVE BOX
Patient was seen and evaluated at bedside. Patient came in the ER after going to neurosurgeon's office. Patient states that she has history of back pain and that has been more severe over the last few weeks. She is unable to walk at this point. Patient states that she had a fall about 2 weeks ago as well. Patient states that she had refused surgery in the past.      Exam : awake alert oriented to person place and time  sensation wnl  Motor-left hf/kf/ke 3/5, df/pf 4/5   Right hf/kf/ke 4/5, df/pf 4+  uppers are wnl   no cranial nerve findings.       Imaging : MR L spine :p

## 2020-09-04 NOTE — REVIEW OF SYSTEMS
[Leg Weakness] : leg weakness [Tingling] : tingling [Numbness] : numbness [Difficulty Walking] : difficulty walking [Negative] : Heme/Lymph [de-identified] : back and leg pain

## 2020-09-04 NOTE — ED PROVIDER NOTE - CCCP TRG CHIEF CMPLNT
Full range of motion of upper and lower extremities, no joint tenderness/swelling.
back and leg pain

## 2020-09-04 NOTE — ED ADULT NURSE REASSESSMENT NOTE - NS ED NURSE REASSESS COMMENT FT1
pt returned from MRI unable to do testing because of level of pain Dr Fulton notified pending further orders

## 2020-09-04 NOTE — ED PROVIDER NOTE - CARE PLAN
Principal Discharge DX:	Acute exacerbation of chronic low back pain Principal Discharge DX:	Acute exacerbation of chronic low back pain  Goal:	**ATTENDING ADDENDUM (Dr. Felipe Edwards): Goals of care include resolution of emergent/urgent symptoms and concerns, and restoration to baseline level of homeostasis.

## 2020-09-04 NOTE — ED PROVIDER NOTE - SHIFT CHANGE DETAILS
Dispo per Neurosurgery Dispo per Neurosurgery  ***ATTENDING ADDENDUM (Dr. Felipe Edwards): I have received handoff from [facility]. Aware and Agree with goals/plan of ED care as described in EMR, including diagnostics, therapeutics and consultation as ordered. Awaiting final recommendations and disposition decision from Neurosurgical team (Arthur). Will continue to observe and monitor closely.

## 2020-09-04 NOTE — ED PROVIDER NOTE - CLINICAL SUMMARY MEDICAL DECISION MAKING FREE TEXT BOX
Chichi Mcconnell MD - Attending Physician: Pt here with acute exacerbation of her low back pain, followed by Dr Gibson, recent fall and now with L leg weakness and difficulty walking x6 days due to pain. No urinary retention/incontinence, able to stand. Pre-ops, NSG consult

## 2020-09-04 NOTE — ED ADULT NURSE REASSESSMENT NOTE - NS ED NURSE REASSESS COMMENT FT1
pt requesting pain med pt states she has not taken any med since yesterday pt states she has been on percocet7.5mg/325 TID at home states gabapentin was prescribed for her yesterday but patient has not taken any as yet  md notified pending further orders

## 2020-09-04 NOTE — H&P ADULT - NSHPLABSRESULTS_GEN_ALL_CORE
Lab Results:  CBC  CBC Full  -  ( 04 Sep 2020 13:24 )  WBC Count : 9.48 K/uL  RBC Count : 4.46 M/uL  Hemoglobin : 13.8 g/dL  Hematocrit : 40.7 %  Platelet Count - Automated : 325 K/uL  Mean Cell Volume : 91.3 fl  Mean Cell Hemoglobin : 30.9 pg  Mean Cell Hemoglobin Concentration : 33.9 gm/dL  Auto Neutrophil # : 6.83 K/uL  Auto Lymphocyte # : 1.96 K/uL  Auto Monocyte # : 0.54 K/uL  Auto Eosinophil # : 0.07 K/uL  Auto Basophil # : 0.04 K/uL  Auto Neutrophil % : 72.1 %  Auto Lymphocyte % : 20.7 %  Auto Monocyte % : 5.7 %  Auto Eosinophil % : 0.7 %  Auto Basophil % : 0.4 %    .		Differential:	[] Automated		[] Manual  Chemistry                        13.8   9.48  )-----------( 325      ( 04 Sep 2020 13:24 )             40.7     09-04    136  |  100  |  14  ----------------------------<  88  4.5   |  22  |  0.55    Ca    10.3      04 Sep 2020 13:24    TPro  7.6  /  Alb  4.7  /  TBili  0.4  /  DBili  x   /  AST  21  /  ALT  15  /  AlkPhos  56  09-04    LIVER FUNCTIONS - ( 04 Sep 2020 13:24 )  Alb: 4.7 g/dL / Pro: 7.6 g/dL / ALK PHOS: 56 U/L / ALT: 15 U/L / AST: 21 U/L / GGT: x           PT/INR - ( 04 Sep 2020 13:24 )   PT: 11.5 sec;   INR: 0.96 ratio         PTT - ( 04 Sep 2020 13:24 )  PTT:38.0 sec  Urinalysis Basic - ( 04 Sep 2020 15:29 )    Color: Light Yellow / Appearance: Slightly Turbid / S.022 / pH: x  Gluc: x / Ketone: Negative  / Bili: Negative / Urobili: Negative   Blood: x / Protein: Trace / Nitrite: Negative   Leuk Esterase: Small / RBC: 14 /hpf / WBC 27 /HPF   Sq Epi: x / Non Sq Epi: 17 / Bacteria: Moderate            MICROBIOLOGY/CULTURES:      RADIOLOGY RESULTS: reviewed

## 2020-09-04 NOTE — ED ADULT NURSE NOTE - NSIMPLEMENTINTERV_GEN_ALL_ED
Implemented All Fall Risk Interventions:  [place] to call system. Call bell, personal items and telephone within reach. Instruct patient to call for assistance. Room bathroom lighting operational. Non-slip footwear when patient is off stretcher. Physically safe environment: no spills, clutter or unnecessary equipment. Stretcher in lowest position, wheels locked, appropriate side rails in place. Provide visual cue, wrist band, yellow gown, etc. Monitor gait and stability. Monitor for mental status changes and reorient to person, place, and time. Review medications for side effects contributing to fall risk. Reinforce activity limits and safety measures with patient and family.

## 2020-09-04 NOTE — CONSULT NOTE ADULT - ASSESSMENT
A/P 26 YO female with severe back pain and leg pain     1 Awaiting MR l spine   2 prn pain meds   3 more plan to follow A/P 26 YO female with severe back pain and leg pain     1 Awaiting MR l spine   2 prn pain meds   3 may admit to medicine service for pain management   4 neuro surgery will follow up after lumbar mri   5 monitor for any acute neurological changes. JEFERSON SAM  25F pw acute worsening of low back pain w/ radiculopathy over 6 days which has made it hard for her to ambulate. Pain is 10/10. Pain radiated down the L butt down the leg, and numbness in LLE. No b/b dysfunction.   Imaging: large central L4/5 disc causing severe canal stenosis  Exam: aox3, left leg hf/kf/ke 3/5 , df/pf 3/5, right leg 4/5 throughout , uppers wnl   -pre op clearance  -PT  -pain control  -will discuss with attending operative planning

## 2020-09-04 NOTE — HISTORY OF PRESENT ILLNESS
[< 3 months] : less than 3 months [FreeTextEntry1] : Left leg pain  [de-identified] : This is a 25 year old female with a long standing history of  chronic lower back pain and leg pain who is seen today for progression of her symptoms and an acute onset of lower back pain and left leg pain since a fall from a stair case  two weeks ago.  Since the fall she has had lower back pain and increased pain of the left lower extremity.  She describes her pain at a 10/10 and burning, tingling and numbness is present.  She is having trouble ambulating, standing and reports weakness.    She has a chronic history of stress incontinence and no changes or increase in her urinary symptoms.  No fecal incontinence.  Her pain is not relieved in any position.   \par \par In the past she has seen multiple specialists for lower back and leg pain and she is managed by a  pain specialist, Dr William.  She has been taking  percocets  long term with no relief.  Last week her pain management specialist prescribed Gabapentin.  Her last  PT session was over three years ago and she is currently in too much pain to attend PT.  Chiropractor therapy was initiated two weeks ago with no  relief.  An epidural injection was given one month ago and provided no relief.  She is wincing in pain during her intake. She uses a cane for ambulation.  Last year her MRI  showed a L4 L5 narrowing with a herniated disc and compression of the right L5 nerve root, with a central disc herniation of L5 S 1 and narrowing with compression of both S 1 nerve roots.  In addition, she did mention that she had gone to an urgent care facility one week ago and had lumbar xrays and the results are unavailable.

## 2020-09-04 NOTE — REASON FOR VISIT
[New Patient Visit] : a new patient visit [Parent] : parent [FreeTextEntry1] : Chronic lumbar radiculopathy

## 2020-09-04 NOTE — H&P ADULT - ASSESSMENT
24 yo F with endometriosis, reflux, pre-HOCM, chronic back pain and left leg pain who presents with acute worsening of the pain over the past 6 days to the point where she is unable to ambulate.  She reports that over the past 6 months she has been unable to walk without a cane, but now is unable to walk at all without help.  She rates her pain as a 10/10 in the lower back and left lower extremity.  Her last MRI showed lumbar degenerative disc disease L4-5 and L5-S1 with central herniations at both levels and a moderate central and significant lateral recess stenosis.  She has experienced sciatica before, but does not think this is that type of pain.  She also reports numbness in the left leg.  She is able to urinate, but requires assistance due to her weakness.  She saw Dr. Gibson this morning who sent her to the ER from the office due to her acute progression of back pain.  She was told she would need surgery due to her disease progression and her lack of improvement with conservative management. Did not take anything for her pain today. Usually takes Oxy

## 2020-09-04 NOTE — CHART NOTE - NSCHARTNOTEFT_GEN_A_CORE
Spoke with patient and Mom bedside. Acute worsening of pre-existing L5-S1 disc herniation with new LLE radicular pain and pain limited weakness. Patient endorses PHMx endometriosis with Hx urinary retention and Hx urge incontinence. Was able to void normally in ED several hours prior.  PVR after most recent void was 29cc.  Refusing discectomy and wants pain control with second opinion re: fusion next week

## 2020-09-04 NOTE — ED PROVIDER NOTE - PLAN OF CARE
**ATTENDING ADDENDUM (Dr. Felipe Edwards): Goals of care include resolution of emergent/urgent symptoms and concerns, and restoration to baseline level of homeostasis.

## 2020-09-04 NOTE — ED ADULT NURSE REASSESSMENT NOTE - NS ED NURSE REASSESS COMMENT FT1
pt continues to cry in pain pt pending evaluation by Dr Edwards who states he will eval pt pt no disposition as yet pending neurosurg

## 2020-09-04 NOTE — H&P ADULT - NSHPPHYSICALEXAM_GEN_ALL_CORE
General: WN/WD NAD  PERRLA  Neurology: A&Ox3, nonfocal, VOSS x 4  Respiratory: CTA B/L  CV: RRR, S1S2, no murmurs, rubs or gallops  Abdominal: Soft, NT, ND +BS, Last BM  Extremities: No edema, + peripheral pulses  Skin Normal

## 2020-09-04 NOTE — ED ADULT NURSE REASSESSMENT NOTE - NS ED NURSE REASSESS COMMENT FT1
pt returned from MRI and ct scan pt crying in pain with Dr Rowland aware neuro surg resident was in er spoke with md delgadoing plan of care pending eval by Dr Gibson

## 2020-09-04 NOTE — ED ADULT NURSE REASSESSMENT NOTE - NS ED NURSE REASSESS COMMENT FT1
pt given ativan as ordered pt pending transport back to MRI via stretcher pt was able to squat over chair standing on two feet to urinate into bedpan with no distress

## 2020-09-04 NOTE — ED PROVIDER NOTE - OBJECTIVE STATEMENT
Cynthia is a 26 yo F with endometriosis, reflux, pre-HOCM, chronic back pain and left leg pain who presents with acute worsening of the pain over the past 6 days to the point where she is unable to ambulate.  She reports that over the past 6 months she has been unable to walk without a cane, but now is unable to walk at all without help.  She rates her pain as a 10/10 in the lower back and left lower extremity.  She has experienced sciatica before, but does not think this is that type of pain.  She also reports numbness in the left leg.  She is able to urinate, but requires assistance due to her weakness.  She saw Dr. Gibson this morning who sent her to the ER due to her acute progression of back pain.  She was told she would need surgery due to her disease progression and her lack of improvement with conservative management. Cynthia is a 26 yo F with endometriosis, reflux, pre-HOCM, chronic back pain and left leg pain who presents with acute worsening of the pain over the past 6 days to the point where she is unable to ambulate.  She reports that over the past 6 months she has been unable to walk without a cane, but now is unable to walk at all without help.  She rates her pain as a 10/10 in the lower back and left lower extremity.  Her last MRI showed lumbar degenerative disc disease L4-5 and L5-S1 with central herniations at both levels and a moderate central and significant lateral recess stenosis.  She has experienced sciatica before, but does not think this is that type of pain.  She also reports numbness in the left leg.  She is able to urinate, but requires assistance due to her weakness.  She saw Dr. Gibson this morning who sent her to the ER due to her acute progression of back pain.  She was told she would need surgery due to her disease progression and her lack of improvement with conservative management. Cynthia is a 24 yo F with endometriosis, reflux, pre-HOCM, chronic back pain and left leg pain who presents with acute worsening of the pain over the past 6 days to the point where she is unable to ambulate.  She reports that over the past 6 months she has been unable to walk without a cane, but now is unable to walk at all without help.  She rates her pain as a 10/10 in the lower back and left lower extremity.  Her last MRI showed lumbar degenerative disc disease L4-5 and L5-S1 with central herniations at both levels and a moderate central and significant lateral recess stenosis.  She has experienced sciatica before, but does not think this is that type of pain.  She also reports numbness in the left leg.  She is able to urinate, but requires assistance due to her weakness.  She saw Dr. Gibson this morning who sent her to the ER from the office due to her acute progression of back pain.  She was told she would need surgery due to her disease progression and her lack of improvement with conservative management. Did not take anything for her pain today. Usually takes Oxy 7.5mg/[id number]

## 2020-09-04 NOTE — CONSULT NOTE ADULT - NUTRITIONAL ASSESSMENT
35  minutes were spent. More than 50 percent of time was spent on examining patient and getting history.

## 2020-09-04 NOTE — ED PROVIDER NOTE - ATTENDING CONTRIBUTION TO CARE
Chichi Mcconnell MD - Attending Physician: I have personally seen and examined this patient with the resident/fellow.  I have fully participated in the care of this patient. I have reviewed all pertinent clinical information, including history, physical exam, plan and the Resident/Fellow’s note and agree except as noted. See MDM

## 2020-09-04 NOTE — ED ADULT NURSE NOTE - OBJECTIVE STATEMENT
25 y female hx of GERD and multiple herniated discs presents from home with LLE numbness and pain radiating from the lower back. Patient reports to following with spinal MD receiving injections. Patient reports over last week pain has increased becoming difficult to walk. Patient reports to spinal MD reporting to come to ED to "evaluate surg." Denies bowel/ urine incontinence. Denies cough, SOB, lightheadedness, dizziness, chest pain. A&Ox3. Skin warm dry and intact. Loss of sensation to LLE; decreased ROM due to pain with numbness/ tingling beginning at left buttock to toe. Breathing comfortably in bed- no distress. Abdomen soft nontender nondistended. Safety maintained- bed locked in lowest position.

## 2020-09-04 NOTE — ED ADULT TRIAGE NOTE - CHIEF COMPLAINT QUOTE
back  and leg pain for a while, unable to walk for 6 days, seen by Dr. Gibson this morning, instructed to come to be seen by Spinal surgery team

## 2020-09-05 LAB
ALBUMIN SERPL ELPH-MCNC: 5.4 G/DL — HIGH (ref 3.3–5)
ALP SERPL-CCNC: 64 U/L — SIGNIFICANT CHANGE UP (ref 40–120)
ALT FLD-CCNC: 12 U/L — SIGNIFICANT CHANGE UP (ref 10–45)
ANION GAP SERPL CALC-SCNC: 14 MMOL/L — SIGNIFICANT CHANGE UP (ref 5–17)
AST SERPL-CCNC: 14 U/L — SIGNIFICANT CHANGE UP (ref 10–40)
BILIRUB SERPL-MCNC: 0.6 MG/DL — SIGNIFICANT CHANGE UP (ref 0.2–1.2)
BUN SERPL-MCNC: 15 MG/DL — SIGNIFICANT CHANGE UP (ref 7–23)
CALCIUM SERPL-MCNC: 10.4 MG/DL — SIGNIFICANT CHANGE UP (ref 8.4–10.5)
CHLORIDE SERPL-SCNC: 95 MMOL/L — LOW (ref 96–108)
CO2 SERPL-SCNC: 26 MMOL/L — SIGNIFICANT CHANGE UP (ref 22–31)
CREAT SERPL-MCNC: 0.72 MG/DL — SIGNIFICANT CHANGE UP (ref 0.5–1.3)
GLUCOSE SERPL-MCNC: 135 MG/DL — HIGH (ref 70–99)
HCT VFR BLD CALC: 43.2 % — SIGNIFICANT CHANGE UP (ref 34.5–45)
HGB BLD-MCNC: 14.6 G/DL — SIGNIFICANT CHANGE UP (ref 11.5–15.5)
MCHC RBC-ENTMCNC: 30.9 PG — SIGNIFICANT CHANGE UP (ref 27–34)
MCHC RBC-ENTMCNC: 33.8 GM/DL — SIGNIFICANT CHANGE UP (ref 32–36)
MCV RBC AUTO: 91.3 FL — SIGNIFICANT CHANGE UP (ref 80–100)
NRBC # BLD: 0 /100 WBCS — SIGNIFICANT CHANGE UP (ref 0–0)
PLATELET # BLD AUTO: 395 K/UL — SIGNIFICANT CHANGE UP (ref 150–400)
POTASSIUM SERPL-MCNC: 4.4 MMOL/L — SIGNIFICANT CHANGE UP (ref 3.5–5.3)
POTASSIUM SERPL-SCNC: 4.4 MMOL/L — SIGNIFICANT CHANGE UP (ref 3.5–5.3)
PROT SERPL-MCNC: 8.2 G/DL — SIGNIFICANT CHANGE UP (ref 6–8.3)
RBC # BLD: 4.73 M/UL — SIGNIFICANT CHANGE UP (ref 3.8–5.2)
RBC # FLD: 12.1 % — SIGNIFICANT CHANGE UP (ref 10.3–14.5)
SODIUM SERPL-SCNC: 135 MMOL/L — SIGNIFICANT CHANGE UP (ref 135–145)
WBC # BLD: 9.28 K/UL — SIGNIFICANT CHANGE UP (ref 3.8–10.5)
WBC # FLD AUTO: 9.28 K/UL — SIGNIFICANT CHANGE UP (ref 3.8–10.5)

## 2020-09-05 RX ORDER — ALPRAZOLAM 0.25 MG
0.25 TABLET ORAL EVERY 12 HOURS
Refills: 0 | Status: DISCONTINUED | OUTPATIENT
Start: 2020-09-05 | End: 2020-09-07

## 2020-09-05 RX ORDER — DEXAMETHASONE 0.5 MG/5ML
10 ELIXIR ORAL ONCE
Refills: 0 | Status: COMPLETED | OUTPATIENT
Start: 2020-09-05 | End: 2020-09-05

## 2020-09-05 RX ORDER — DIAZEPAM 5 MG
5 TABLET ORAL ONCE
Refills: 0 | Status: DISCONTINUED | OUTPATIENT
Start: 2020-09-05 | End: 2020-09-05

## 2020-09-05 RX ORDER — MORPHINE SULFATE 50 MG/1
2 CAPSULE, EXTENDED RELEASE ORAL EVERY 4 HOURS
Refills: 0 | Status: DISCONTINUED | OUTPATIENT
Start: 2020-09-05 | End: 2020-09-05

## 2020-09-05 RX ORDER — SODIUM CHLORIDE 9 MG/ML
1000 INJECTION, SOLUTION INTRAVENOUS
Refills: 0 | Status: DISCONTINUED | OUTPATIENT
Start: 2020-09-05 | End: 2020-09-05

## 2020-09-05 RX ORDER — OXYCODONE AND ACETAMINOPHEN 5; 325 MG/1; MG/1
2 TABLET ORAL EVERY 6 HOURS
Refills: 0 | Status: DISCONTINUED | OUTPATIENT
Start: 2020-09-05 | End: 2020-09-10

## 2020-09-05 RX ORDER — DEXAMETHASONE 0.5 MG/5ML
4 ELIXIR ORAL EVERY 6 HOURS
Refills: 0 | Status: DISCONTINUED | OUTPATIENT
Start: 2020-09-05 | End: 2020-09-07

## 2020-09-05 RX ORDER — MORPHINE SULFATE 50 MG/1
2 CAPSULE, EXTENDED RELEASE ORAL EVERY 4 HOURS
Refills: 0 | Status: DISCONTINUED | OUTPATIENT
Start: 2020-09-05 | End: 2020-09-09

## 2020-09-05 RX ORDER — DIAZEPAM 5 MG
5 TABLET ORAL EVERY 8 HOURS
Refills: 0 | Status: DISCONTINUED | OUTPATIENT
Start: 2020-09-05 | End: 2020-09-07

## 2020-09-05 RX ADMIN — OXYCODONE AND ACETAMINOPHEN 2 TABLET(S): 5; 325 TABLET ORAL at 11:17

## 2020-09-05 RX ADMIN — OXYCODONE AND ACETAMINOPHEN 2 TABLET(S): 5; 325 TABLET ORAL at 03:20

## 2020-09-05 RX ADMIN — Medication 5 MILLIGRAM(S): at 01:08

## 2020-09-05 RX ADMIN — Medication 5 MILLIGRAM(S): at 17:20

## 2020-09-05 RX ADMIN — Medication 102 MILLIGRAM(S): at 06:49

## 2020-09-05 RX ADMIN — OXYCODONE AND ACETAMINOPHEN 2 TABLET(S): 5; 325 TABLET ORAL at 18:16

## 2020-09-05 RX ADMIN — OXYCODONE AND ACETAMINOPHEN 2 TABLET(S): 5; 325 TABLET ORAL at 02:51

## 2020-09-05 RX ADMIN — OXYCODONE AND ACETAMINOPHEN 2 TABLET(S): 5; 325 TABLET ORAL at 18:46

## 2020-09-05 RX ADMIN — MORPHINE SULFATE 2 MILLIGRAM(S): 50 CAPSULE, EXTENDED RELEASE ORAL at 14:10

## 2020-09-05 RX ADMIN — MORPHINE SULFATE 2 MILLIGRAM(S): 50 CAPSULE, EXTENDED RELEASE ORAL at 19:30

## 2020-09-05 RX ADMIN — Medication 0.25 MILLIGRAM(S): at 15:02

## 2020-09-05 RX ADMIN — MORPHINE SULFATE 2 MILLIGRAM(S): 50 CAPSULE, EXTENDED RELEASE ORAL at 20:00

## 2020-09-05 RX ADMIN — MORPHINE SULFATE 2 MILLIGRAM(S): 50 CAPSULE, EXTENDED RELEASE ORAL at 13:55

## 2020-09-05 RX ADMIN — SODIUM CHLORIDE 75 MILLILITER(S): 9 INJECTION, SOLUTION INTRAVENOUS at 10:54

## 2020-09-05 RX ADMIN — MORPHINE SULFATE 2 MILLIGRAM(S): 50 CAPSULE, EXTENDED RELEASE ORAL at 07:05

## 2020-09-05 RX ADMIN — PANTOPRAZOLE SODIUM 40 MILLIGRAM(S): 20 TABLET, DELAYED RELEASE ORAL at 06:49

## 2020-09-05 RX ADMIN — Medication 5 MILLIGRAM(S): at 09:00

## 2020-09-05 RX ADMIN — Medication 4 MILLIGRAM(S): at 11:26

## 2020-09-05 RX ADMIN — OXYCODONE AND ACETAMINOPHEN 2 TABLET(S): 5; 325 TABLET ORAL at 10:47

## 2020-09-05 RX ADMIN — MORPHINE SULFATE 2 MILLIGRAM(S): 50 CAPSULE, EXTENDED RELEASE ORAL at 00:25

## 2020-09-05 RX ADMIN — MORPHINE SULFATE 2 MILLIGRAM(S): 50 CAPSULE, EXTENDED RELEASE ORAL at 06:49

## 2020-09-05 RX ADMIN — MORPHINE SULFATE 2 MILLIGRAM(S): 50 CAPSULE, EXTENDED RELEASE ORAL at 00:10

## 2020-09-05 RX ADMIN — Medication 4 MILLIGRAM(S): at 17:20

## 2020-09-05 NOTE — CHART NOTE - NSCHARTNOTEFT_GEN_A_CORE
Patient with cramping/spasm pain, was instructed by Neurosurgery, patient can be given decadron IV instead of PO and valium 5mg prn as needed for spams. Will monitor closely, monitor VS, patient/mother aware of plan.     Mayco Chua PA-C  Department of Medicine Patient with cramping/spasm pain, was instructed by Neurosurgery, patient can be given decadron IV instead of PO(decadron 10mg ivpb loading dose, followed by 4mg Iv q 6 hr) and valium 5mg prn as needed for spams. Will monitor closely, monitor VS, patient/mother aware of plan.     Mayco Chua PA-C  Department of Medicine

## 2020-09-05 NOTE — CHART NOTE - NSCHARTNOTEFT_GEN_A_CORE
Patient tearful/crying in bed and anxious. Patient already on valium 5mg q8 prn.  Patient received xanax overnight.    Discussed with homa Webster to give xanax .25 q12 hr prn.    Delio Madison  Dept of Medicine  [id number]

## 2020-09-05 NOTE — PROGRESS NOTE ADULT - SUBJECTIVE AND OBJECTIVE BOX
Patient is a 25y old  Female who presents with a chief complaint of Worsening back pain (04 Sep 2020 18:36)      INTERVAL HPI/OVERNIGHT EVENTS:  T(C): 36.7 (20 @ 06:52), Max: 37.7 (20 @ 12:57)  HR: 79 (20 @ 06:52) (79 - 124)  BP: 132/89 (20 @ 06:52) (112/88 - 145/91)  RR: 18 (20 @ 06:52) (18 - 20)  SpO2: 99% (20 @ 06:52) (97% - 100%)  Wt(kg): --  I&O's Summary    04 Sep 2020 07:01  -  05 Sep 2020 07:00  --------------------------------------------------------  IN: 300 mL / OUT: 400 mL / NET: -100 mL        LABS:                        14.6   9.28  )-----------( 395      ( 05 Sep 2020 07:04 )             43.2     09-05    135  |  95<L>  |  15  ----------------------------<  135<H>  4.4   |  26  |  0.72    Ca    10.4      05 Sep 2020 07:04    TPro  8.2  /  Alb  5.4<H>  /  TBili  0.6  /  DBili  x   /  AST  14  /  ALT  12  /  AlkPhos  64  09-05    PT/INR - ( 04 Sep 2020 13:24 )   PT: 11.5 sec;   INR: 0.96 ratio         PTT - ( 04 Sep 2020 13:24 )  PTT:38.0 sec  Urinalysis Basic - ( 04 Sep 2020 15:29 )    Color: Light Yellow / Appearance: Slightly Turbid / S.022 / pH: x  Gluc: x / Ketone: Negative  / Bili: Negative / Urobili: Negative   Blood: x / Protein: Trace / Nitrite: Negative   Leuk Esterase: Small / RBC: 14 /hpf / WBC 27 /HPF   Sq Epi: x / Non Sq Epi: 17 / Bacteria: Moderate      CAPILLARY BLOOD GLUCOSE            Urinalysis Basic - ( 04 Sep 2020 15:29 )    Color: Light Yellow / Appearance: Slightly Turbid / S.022 / pH: x  Gluc: x / Ketone: Negative  / Bili: Negative / Urobili: Negative   Blood: x / Protein: Trace / Nitrite: Negative   Leuk Esterase: Small / RBC: 14 /hpf / WBC 27 /HPF   Sq Epi: x / Non Sq Epi: 17 / Bacteria: Moderate        MEDICATIONS  (STANDING):  dexAMETHasone  Injectable 4 milliGRAM(s) IV Push every 6 hours  pantoprazole    Tablet 40 milliGRAM(s) Oral before breakfast    MEDICATIONS  (PRN):  morphine  - Injectable 2 milliGRAM(s) IV Push every 4 hours PRN Severe Pain (7 - 10)  oxycodone    5 mG/acetaminophen 325 mG 2 Tablet(s) Oral every 6 hours PRN Moderate Pain (4 - 6)          PHYSICAL EXAM:  GENERAL: NAD, well-groomed, well-developed  HEAD:  Atraumatic, Normocephalic  CHEST/LUNG: Clear to percussion bilaterally; No rales, rhonchi, wheezing, or rubs  HEART: Regular rate and rhythm; No murmurs, rubs, or gallops  ABDOMEN: Soft, Nontender, Nondistended; Bowel sounds present  EXTREMITIES:  2+ Peripheral Pulses, No clubbing, cyanosis, or edema  LYMPH: No lymphadenopathy noted  SKIN: No rashes or lesions    Care Discussed with Consultants/Other Providers [ ] YES  [ ] NO

## 2020-09-05 NOTE — PROGRESS NOTE ADULT - SUBJECTIVE AND OBJECTIVE BOX
Patient seen and examined at bedside.    --Anticoagulation--    T(C): 36.8 (09-05-20 @ 13:06), Max: 37.1 (09-04-20 @ 19:32)  HR: 85 (09-05-20 @ 13:06) (79 - 90)  BP: 134/80 (09-05-20 @ 13:06) (120/86 - 145/91)  RR: 16 (09-05-20 @ 13:06) (16 - 18)  SpO2: 98% (09-05-20 @ 13:06) (97% - 99%)  Wt(kg): --    Exam:  Exam:  AOx3, Following Commands  Motor:          Upper extremity                       Delt      Bicep     Tricep     HG                                                 L         5/5        5/5          5/5        5/5                                               R          5/5       5/5          5/5        5/5          Lower extremity                           HF          KF         KE         DF        PF                                                  L           4+/5       4+/5        5/5       5/5        5/5                                               R           5/5         5/5        5/5       5/5        5/5  Sensation / Reflexes  [x] intact to light touch  [ ] decreased:   No clonus

## 2020-09-05 NOTE — PROVIDER CONTACT NOTE (OTHER) - ASSESSMENT
Pt in bed yelling out and crying in pain despite receiving pain meds, however still c/o severe back and LE pain. VSS.

## 2020-09-06 PROCEDURE — 99232 SBSQ HOSP IP/OBS MODERATE 35: CPT

## 2020-09-06 PROCEDURE — 72114 X-RAY EXAM L-S SPINE BENDING: CPT | Mod: 26

## 2020-09-06 PROCEDURE — 93010 ELECTROCARDIOGRAM REPORT: CPT

## 2020-09-06 RX ORDER — IBUPROFEN 200 MG
200 TABLET ORAL ONCE
Refills: 0 | Status: COMPLETED | OUTPATIENT
Start: 2020-09-06 | End: 2020-09-06

## 2020-09-06 RX ADMIN — Medication 4 MILLIGRAM(S): at 00:05

## 2020-09-06 RX ADMIN — MORPHINE SULFATE 2 MILLIGRAM(S): 50 CAPSULE, EXTENDED RELEASE ORAL at 14:25

## 2020-09-06 RX ADMIN — Medication 0.25 MILLIGRAM(S): at 23:08

## 2020-09-06 RX ADMIN — PANTOPRAZOLE SODIUM 40 MILLIGRAM(S): 20 TABLET, DELAYED RELEASE ORAL at 06:03

## 2020-09-06 RX ADMIN — MORPHINE SULFATE 2 MILLIGRAM(S): 50 CAPSULE, EXTENDED RELEASE ORAL at 14:40

## 2020-09-06 RX ADMIN — Medication 4 MILLIGRAM(S): at 06:03

## 2020-09-06 RX ADMIN — MORPHINE SULFATE 2 MILLIGRAM(S): 50 CAPSULE, EXTENDED RELEASE ORAL at 20:40

## 2020-09-06 RX ADMIN — OXYCODONE AND ACETAMINOPHEN 2 TABLET(S): 5; 325 TABLET ORAL at 17:44

## 2020-09-06 RX ADMIN — MORPHINE SULFATE 2 MILLIGRAM(S): 50 CAPSULE, EXTENDED RELEASE ORAL at 20:03

## 2020-09-06 RX ADMIN — OXYCODONE AND ACETAMINOPHEN 2 TABLET(S): 5; 325 TABLET ORAL at 09:43

## 2020-09-06 RX ADMIN — Medication 5 MILLIGRAM(S): at 17:08

## 2020-09-06 RX ADMIN — OXYCODONE AND ACETAMINOPHEN 2 TABLET(S): 5; 325 TABLET ORAL at 09:13

## 2020-09-06 RX ADMIN — MORPHINE SULFATE 2 MILLIGRAM(S): 50 CAPSULE, EXTENDED RELEASE ORAL at 06:03

## 2020-09-06 RX ADMIN — Medication 5 MILLIGRAM(S): at 07:19

## 2020-09-06 RX ADMIN — OXYCODONE AND ACETAMINOPHEN 2 TABLET(S): 5; 325 TABLET ORAL at 00:40

## 2020-09-06 RX ADMIN — Medication 4 MILLIGRAM(S): at 12:14

## 2020-09-06 RX ADMIN — OXYCODONE AND ACETAMINOPHEN 2 TABLET(S): 5; 325 TABLET ORAL at 18:14

## 2020-09-06 RX ADMIN — Medication 200 MILLIGRAM(S): at 23:44

## 2020-09-06 RX ADMIN — MORPHINE SULFATE 2 MILLIGRAM(S): 50 CAPSULE, EXTENDED RELEASE ORAL at 06:40

## 2020-09-06 RX ADMIN — Medication 4 MILLIGRAM(S): at 17:08

## 2020-09-06 RX ADMIN — OXYCODONE AND ACETAMINOPHEN 2 TABLET(S): 5; 325 TABLET ORAL at 00:05

## 2020-09-06 RX ADMIN — Medication 4 MILLIGRAM(S): at 23:08

## 2020-09-06 RX ADMIN — OXYCODONE AND ACETAMINOPHEN 2 TABLET(S): 5; 325 TABLET ORAL at 23:45

## 2020-09-06 NOTE — PROGRESS NOTE ADULT - SUBJECTIVE AND OBJECTIVE BOX
Patient is a 25y old  Female who presents with a chief complaint of Worsening back pain (06 Sep 2020 10:20)    HPI: Pt in bed, c/o persistent severe pain.     Vital Signs Last 24 Hrs  T(C): 37 (06 Sep 2020 13:41), Max: 37 (06 Sep 2020 13:41)  T(F): 98.6 (06 Sep 2020 13:41), Max: 98.6 (06 Sep 2020 13:41)  HR: 78 (06 Sep 2020 13:41) (76 - 96)  BP: 134/81 (06 Sep 2020 13:41) (115/81 - 134/89)  BP(mean): --  RR: 16 (06 Sep 2020 13:41) (16 - 16)  SpO2: 96% (06 Sep 2020 13:41) (96% - 97%)                          14.6   9.28  )-----------( 395      ( 05 Sep 2020 07:04 )             43.2     09-05    135  |  95<L>  |  15  ----------------------------<  135<H>  4.4   |  26  |  0.72    Ca    10.4      05 Sep 2020 07:04    TPro  8.2  /  Alb  5.4<H>  /  TBili  0.6  /  DBili  x   /  AST  14  /  ALT  12  /  AlkPhos  64  09-05    MEDICATIONS  (STANDING):  dexAMETHasone  Injectable 4 milliGRAM(s) IV Push every 6 hours  pantoprazole    Tablet 40 milliGRAM(s) Oral before breakfast    MEDICATIONS  (PRN):  ALPRAZolam 0.25 milliGRAM(s) Oral every 12 hours PRN anxiety  diazepam    Tablet 5 milliGRAM(s) Oral every 8 hours PRN muscle spasm  morphine  - Injectable 2 milliGRAM(s) IV Push every 4 hours PRN breakthrough  oxycodone    5 mG/acetaminophen 325 mG 2 Tablet(s) Oral every 6 hours PRN Severe Pain (7 - 10)

## 2020-09-06 NOTE — PROGRESS NOTE ADULT - SUBJECTIVE AND OBJECTIVE BOX
Patient seen and examined at bedside.    --Anticoagulation--    T(C): 36.8 (09-06-20 @ 08:25), Max: 36.9 (09-05-20 @ 20:25)  HR: 76 (09-06-20 @ 08:25) (76 - 96)  BP: 134/89 (09-06-20 @ 08:25) (115/81 - 134/89)  RR: 16 (09-06-20 @ 08:25) (16 - 16)  SpO2: 97% (09-06-20 @ 08:25) (96% - 99%)  Wt(kg): --    Exam:  AOx3, Following Commands  Motor:          Upper extremity                       Delt      Bicep     Tricep     HG                                                 L         5/5        5/5          5/5        5/5                                               R          5/5       5/5          5/5        5/5          Lower extremity                           HF          KF         KE         DF        PF                                                  L           4+/5       4+/5        5/5       5/5        5/5                                               R           5/5         5/5        5/5       5/5        5/5  Sensation / Reflexes  [x] intact to light touch  [ ] decreased:   No clonus    Imaging:     MRL 9/4: large central disc L4-5 w/ severe canal stensosis, smaller disc at L3-4    CTL 9/4: sacralized L5, large central disc L4-5 w/ severe canal stensosis, smaller disc at L3-4    Labs:                           14.6   9.28  )-----------( 395      ( 05 Sep 2020 07:04 )             43.2     09-05    135  |  95<L>  |  15  ----------------------------<  135<H>  4.4   |  26  |  0.72    Ca    10.4      05 Sep 2020 07:04    TPro  8.2  /  Alb  5.4<H>  /  TBili  0.6  /  DBili  x   /  AST  14  /  ALT  12  /  AlkPhos  64  09-05    PT/INR - ( 04 Sep 2020 13:24 )   PT: 11.5 sec;   INR: 0.96 ratio         PTT - ( 04 Sep 2020 13:24 )  PTT:38.0 sec    COVID-19 PCR: NotDetec (04 Sep 2020 12:56)

## 2020-09-07 PROCEDURE — 99253 IP/OBS CNSLTJ NEW/EST LOW 45: CPT | Mod: GC

## 2020-09-07 PROCEDURE — 99233 SBSQ HOSP IP/OBS HIGH 50: CPT

## 2020-09-07 RX ORDER — DIAZEPAM 5 MG
2 TABLET ORAL DAILY
Refills: 0 | Status: DISCONTINUED | OUTPATIENT
Start: 2020-09-07 | End: 2020-09-10

## 2020-09-07 RX ORDER — ONDANSETRON 8 MG/1
4 TABLET, FILM COATED ORAL ONCE
Refills: 0 | Status: COMPLETED | OUTPATIENT
Start: 2020-09-07 | End: 2020-09-07

## 2020-09-07 RX ORDER — DIAZEPAM 5 MG
5 TABLET ORAL AT BEDTIME
Refills: 0 | Status: DISCONTINUED | OUTPATIENT
Start: 2020-09-07 | End: 2020-09-10

## 2020-09-07 RX ORDER — ALPRAZOLAM 0.25 MG
0.5 TABLET ORAL EVERY 6 HOURS
Refills: 0 | Status: DISCONTINUED | OUTPATIENT
Start: 2020-09-07 | End: 2020-09-10

## 2020-09-07 RX ORDER — DEXAMETHASONE 0.5 MG/5ML
4 ELIXIR ORAL EVERY 8 HOURS
Refills: 0 | Status: DISCONTINUED | OUTPATIENT
Start: 2020-09-07 | End: 2020-09-10

## 2020-09-07 RX ORDER — ALPRAZOLAM 0.25 MG
0.5 TABLET ORAL THREE TIMES A DAY
Refills: 0 | Status: DISCONTINUED | OUTPATIENT
Start: 2020-09-07 | End: 2020-09-07

## 2020-09-07 RX ADMIN — MORPHINE SULFATE 2 MILLIGRAM(S): 50 CAPSULE, EXTENDED RELEASE ORAL at 05:40

## 2020-09-07 RX ADMIN — ONDANSETRON 4 MILLIGRAM(S): 8 TABLET, FILM COATED ORAL at 08:26

## 2020-09-07 RX ADMIN — Medication 0.5 MILLIGRAM(S): at 17:30

## 2020-09-07 RX ADMIN — OXYCODONE AND ACETAMINOPHEN 2 TABLET(S): 5; 325 TABLET ORAL at 17:30

## 2020-09-07 RX ADMIN — MORPHINE SULFATE 2 MILLIGRAM(S): 50 CAPSULE, EXTENDED RELEASE ORAL at 11:11

## 2020-09-07 RX ADMIN — OXYCODONE AND ACETAMINOPHEN 2 TABLET(S): 5; 325 TABLET ORAL at 00:16

## 2020-09-07 RX ADMIN — MORPHINE SULFATE 2 MILLIGRAM(S): 50 CAPSULE, EXTENDED RELEASE ORAL at 10:41

## 2020-09-07 RX ADMIN — Medication 4 MILLIGRAM(S): at 22:38

## 2020-09-07 RX ADMIN — MORPHINE SULFATE 2 MILLIGRAM(S): 50 CAPSULE, EXTENDED RELEASE ORAL at 20:50

## 2020-09-07 RX ADMIN — MORPHINE SULFATE 2 MILLIGRAM(S): 50 CAPSULE, EXTENDED RELEASE ORAL at 20:32

## 2020-09-07 RX ADMIN — Medication 1 MILLIGRAM(S): at 09:10

## 2020-09-07 RX ADMIN — OXYCODONE AND ACETAMINOPHEN 2 TABLET(S): 5; 325 TABLET ORAL at 09:10

## 2020-09-07 RX ADMIN — MORPHINE SULFATE 2 MILLIGRAM(S): 50 CAPSULE, EXTENDED RELEASE ORAL at 14:19

## 2020-09-07 RX ADMIN — Medication 4 MILLIGRAM(S): at 05:08

## 2020-09-07 RX ADMIN — Medication 200 MILLIGRAM(S): at 00:16

## 2020-09-07 RX ADMIN — Medication 5 MILLIGRAM(S): at 22:38

## 2020-09-07 RX ADMIN — MORPHINE SULFATE 2 MILLIGRAM(S): 50 CAPSULE, EXTENDED RELEASE ORAL at 05:08

## 2020-09-07 RX ADMIN — Medication 4 MILLIGRAM(S): at 14:19

## 2020-09-07 RX ADMIN — MORPHINE SULFATE 2 MILLIGRAM(S): 50 CAPSULE, EXTENDED RELEASE ORAL at 14:49

## 2020-09-07 RX ADMIN — PANTOPRAZOLE SODIUM 40 MILLIGRAM(S): 20 TABLET, DELAYED RELEASE ORAL at 05:08

## 2020-09-07 RX ADMIN — OXYCODONE AND ACETAMINOPHEN 2 TABLET(S): 5; 325 TABLET ORAL at 09:40

## 2020-09-07 RX ADMIN — Medication 5 MILLIGRAM(S): at 14:19

## 2020-09-07 NOTE — BEHAVIORAL HEALTH ASSESSMENT NOTE - NSBHREFERDETAILS_PSY_A_CORE_FT
Patient having "anxiety attack" in context of upcoming spinal procedure. Has been on psych meds (Cymbalta) before, requesting assistance with anxiety mgmt.

## 2020-09-07 NOTE — BEHAVIORAL HEALTH ASSESSMENT NOTE - CASE SUMMARY
This si a 25-y.o. CF patient with endometriosis, reflux, pre-HOCM, chronic back pain and left leg pain who presents with acute worsening of the pain over the past 6 days to the point where she is unable to ambulate. Imaging with large central L4/5 disc herniation causing severe canal stenosis. No prior psych history or outpatient psychiatrist but has been on Cymbalta for 5 months in the past for neuropathic pain which hasn’t been helpful for pain or anxiety.    I have seen and evaluated this patient myself. Chart, labs, meds reviewed. I agree with resident's assessment and plan.

## 2020-09-07 NOTE — BEHAVIORAL HEALTH ASSESSMENT NOTE - HPI (INCLUDE ILLNESS QUALITY, SEVERITY, DURATION, TIMING, CONTEXT, MODIFYING FACTORS, ASSOCIATED SIGNS AND SYMPTOMS)
26 yo F with endometriosis, reflux, pre-HOCM, chronic back pain and left leg pain who presents with acute worsening of the pain over the past 6 days to the point where she is unable to ambulate. Imaging with large central L4/5 disc herniation causing severe canal stenosis. No prior psych history or outpatient psychiatrist but has been on Cymbalta for 5 months in the past for neuropathic pain which hasn’t been helpful for pain or anxiety.    She reports the pain has made it difficult to function and has led to mounting anxiety. This has been a growing issue for 3 years. She has been told she has suspected Ehler Danlos but genetic testing was halted in the context of the Covid-19 pandemic. 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     States that she has been on oxycodone (istop reviewed) for pain chronically but has taken, on select occasions, when pain was severe, valium and xanax from either friends or her mom which have been helpful in those situations. She was supposed to start a gabapentin prescription but she did not pick it up due to the current hospitalization 2/2 severe pain and inability to ambulate. Wants to be have her meds adjusted so that she can take Xanax here as PRN and have valium at standing med. She endorses marijuana and tobacco use at home, but no alcohol or other substances. She denies regular use of benzodiazepines. She reports issues with sleep due to pain/anxiety. She denies active SI, SH, or plan stating "she loves life" but is dealing with a lot of pain to the point of possible passive SI.

## 2020-09-07 NOTE — BEHAVIORAL HEALTH ASSESSMENT NOTE - RISK ASSESSMENT
Low Acute Suicide Risk Chronic pain in a young adult with some substance use (Marijuana) and occasional use of non-prescribed BZDs. Protective factors include dedication to employment and supportive figures (Mom assisting with work).

## 2020-09-07 NOTE — BEHAVIORAL HEALTH ASSESSMENT NOTE - SUICIDE RISK FACTORS
Chronic pain/Access to lethal methods (pills, firearm, etc.: Ask specifically about presence or absence of a firearm in the home or ease of accessing/Agitation/Severe Anxiety/Panic

## 2020-09-07 NOTE — BEHAVIORAL HEALTH ASSESSMENT NOTE - SUMMARY
24 yo F with endometriosis, reflux, pre-HOCM, chronic back pain and left leg pain who presents with acute worsening of the pain over the past 6 days to the point where she is unable to ambulate. Imaging with large central L4/5 disc herniation causing severe canal stenosis. No prior psych history or outpatient psychiatrist but has been on Cymbalta for 5 months in the past for neuropathic pain which hasn’t been helpful for pain or anxiety.     Experiencing severe anxiety in the setting of pain and upcoming involved spinal surgery. No active SI or plan, but with symptoms making sleep difficult/day-to-day living. Patient with issues adjusting to current health status and management of acute pain but also likely chronic issues associated with suspected Ehler-Danlos syndrome. No prior psych history or family psych history, but patient can benefit from regular mental health support moving forward.

## 2020-09-07 NOTE — BEHAVIORAL HEALTH ASSESSMENT NOTE - DETAILS
Focused on pain, states it is unbearable, passive SI associated possibly Uncle with alcoholism Severe back pain Radiating pain and numbness/weakness of lower extremity

## 2020-09-07 NOTE — PROGRESS NOTE ADULT - SUBJECTIVE AND OBJECTIVE BOX
Patient is a 25y old  Female who presents with a chief complaint of Worsening back pain (07 Sep 2020 12:17)    HPI: Pt very anxious today. Had to change rooms this am due to some architectural mishap in her room. Has not slept.     Vital Signs Last 24 Hrs  T(C): 36.7 (07 Sep 2020 08:28), Max: 37 (06 Sep 2020 16:17)  T(F): 98.1 (07 Sep 2020 08:28), Max: 98.6 (06 Sep 2020 16:17)  HR: 72 (07 Sep 2020 08:28) (72 - 78)  BP: 141/96 (07 Sep 2020 08:28) (122/78 - 152/96)  BP(mean): --  RR: 18 (07 Sep 2020 08:28) (16 - 18)  SpO2: 95% (07 Sep 2020 08:28) (93% - 97%)    MEDICATIONS  (STANDING):  dexAMETHasone  Injectable 4 milliGRAM(s) IV Push every 8 hours  pantoprazole    Tablet 40 milliGRAM(s) Oral before breakfast    MEDICATIONS  (PRN):  ALPRAZolam 0.5 milliGRAM(s) Oral three times a day PRN Anxiety  diazepam    Tablet 5 milliGRAM(s) Oral every 8 hours PRN muscle spasm  morphine  - Injectable 2 milliGRAM(s) IV Push every 4 hours PRN breakthrough  oxycodone    5 mG/acetaminophen 325 mG 2 Tablet(s) Oral every 6 hours PRN Severe Pain (7 - 10)

## 2020-09-07 NOTE — PROGRESS NOTE ADULT - SUBJECTIVE AND OBJECTIVE BOX
Patient seen and examined at bedside.    Exam aox3, left leg hf/kf/ke 3/5 , df/pf 3/5, right leg 4/5 throughout , uppers wnl     MRL 9/4: large central disc L4-5 w/ severe canal stensosis, smaller disc at L3-4    CTL 9/4: sacralized L5, large central disc L4-5 w/ severe canal stensosis, smaller disc at L3-4

## 2020-09-08 LAB
ANION GAP SERPL CALC-SCNC: 17 MMOL/L — SIGNIFICANT CHANGE UP (ref 5–17)
APTT BLD: 27.8 SEC — SIGNIFICANT CHANGE UP (ref 27.5–35.5)
BUN SERPL-MCNC: 20 MG/DL — SIGNIFICANT CHANGE UP (ref 7–23)
CALCIUM SERPL-MCNC: 9.7 MG/DL — SIGNIFICANT CHANGE UP (ref 8.4–10.5)
CHLORIDE SERPL-SCNC: 96 MMOL/L — SIGNIFICANT CHANGE UP (ref 96–108)
CO2 SERPL-SCNC: 24 MMOL/L — SIGNIFICANT CHANGE UP (ref 22–31)
CREAT SERPL-MCNC: 0.53 MG/DL — SIGNIFICANT CHANGE UP (ref 0.5–1.3)
GLUCOSE SERPL-MCNC: 117 MG/DL — HIGH (ref 70–99)
HCT VFR BLD CALC: 42.2 % — SIGNIFICANT CHANGE UP (ref 34.5–45)
HGB BLD-MCNC: 14.4 G/DL — SIGNIFICANT CHANGE UP (ref 11.5–15.5)
INR BLD: 0.89 RATIO — SIGNIFICANT CHANGE UP (ref 0.88–1.16)
MCHC RBC-ENTMCNC: 31 PG — SIGNIFICANT CHANGE UP (ref 27–34)
MCHC RBC-ENTMCNC: 34.1 GM/DL — SIGNIFICANT CHANGE UP (ref 32–36)
MCV RBC AUTO: 90.8 FL — SIGNIFICANT CHANGE UP (ref 80–100)
MRSA PCR RESULT.: SIGNIFICANT CHANGE UP
NRBC # BLD: 0 /100 WBCS — SIGNIFICANT CHANGE UP (ref 0–0)
PLATELET # BLD AUTO: 417 K/UL — HIGH (ref 150–400)
POTASSIUM SERPL-MCNC: 4.2 MMOL/L — SIGNIFICANT CHANGE UP (ref 3.5–5.3)
POTASSIUM SERPL-SCNC: 4.2 MMOL/L — SIGNIFICANT CHANGE UP (ref 3.5–5.3)
PROTHROM AB SERPL-ACNC: 10.6 SEC — SIGNIFICANT CHANGE UP (ref 10.6–13.6)
RBC # BLD: 4.65 M/UL — SIGNIFICANT CHANGE UP (ref 3.8–5.2)
RBC # FLD: 12.5 % — SIGNIFICANT CHANGE UP (ref 10.3–14.5)
S AUREUS DNA NOSE QL NAA+PROBE: SIGNIFICANT CHANGE UP
SARS-COV-2 IGG SERPL QL IA: NEGATIVE — SIGNIFICANT CHANGE UP
SARS-COV-2 IGM SERPL IA-ACNC: <3.8 AU/ML — SIGNIFICANT CHANGE UP
SODIUM SERPL-SCNC: 137 MMOL/L — SIGNIFICANT CHANGE UP (ref 135–145)
WBC # BLD: 16.58 K/UL — HIGH (ref 3.8–10.5)
WBC # FLD AUTO: 16.58 K/UL — HIGH (ref 3.8–10.5)

## 2020-09-08 PROCEDURE — 99233 SBSQ HOSP IP/OBS HIGH 50: CPT

## 2020-09-08 PROCEDURE — 99232 SBSQ HOSP IP/OBS MODERATE 35: CPT | Mod: GC

## 2020-09-08 RX ORDER — ONDANSETRON 8 MG/1
4 TABLET, FILM COATED ORAL ONCE
Refills: 0 | Status: COMPLETED | OUTPATIENT
Start: 2020-09-08 | End: 2020-09-08

## 2020-09-08 RX ORDER — HEPARIN SODIUM 5000 [USP'U]/ML
5000 INJECTION INTRAVENOUS; SUBCUTANEOUS EVERY 8 HOURS
Refills: 0 | Status: DISCONTINUED | OUTPATIENT
Start: 2020-09-08 | End: 2020-09-10

## 2020-09-08 RX ORDER — INFLUENZA VIRUS VACCINE 15; 15; 15; 15 UG/.5ML; UG/.5ML; UG/.5ML; UG/.5ML
0.5 SUSPENSION INTRAMUSCULAR ONCE
Refills: 0 | Status: DISCONTINUED | OUTPATIENT
Start: 2020-09-08 | End: 2020-09-10

## 2020-09-08 RX ORDER — HYDROMORPHONE HYDROCHLORIDE 2 MG/ML
0.5 INJECTION INTRAMUSCULAR; INTRAVENOUS; SUBCUTANEOUS ONCE
Refills: 0 | Status: DISCONTINUED | OUTPATIENT
Start: 2020-09-08 | End: 2020-09-08

## 2020-09-08 RX ADMIN — Medication 5 MILLIGRAM(S): at 21:58

## 2020-09-08 RX ADMIN — OXYCODONE AND ACETAMINOPHEN 2 TABLET(S): 5; 325 TABLET ORAL at 12:43

## 2020-09-08 RX ADMIN — MORPHINE SULFATE 2 MILLIGRAM(S): 50 CAPSULE, EXTENDED RELEASE ORAL at 23:39

## 2020-09-08 RX ADMIN — MORPHINE SULFATE 2 MILLIGRAM(S): 50 CAPSULE, EXTENDED RELEASE ORAL at 14:10

## 2020-09-08 RX ADMIN — MORPHINE SULFATE 2 MILLIGRAM(S): 50 CAPSULE, EXTENDED RELEASE ORAL at 06:53

## 2020-09-08 RX ADMIN — OXYCODONE AND ACETAMINOPHEN 2 TABLET(S): 5; 325 TABLET ORAL at 12:13

## 2020-09-08 RX ADMIN — OXYCODONE AND ACETAMINOPHEN 2 TABLET(S): 5; 325 TABLET ORAL at 18:45

## 2020-09-08 RX ADMIN — Medication 0.5 MILLIGRAM(S): at 18:15

## 2020-09-08 RX ADMIN — OXYCODONE AND ACETAMINOPHEN 2 TABLET(S): 5; 325 TABLET ORAL at 06:45

## 2020-09-08 RX ADMIN — HYDROMORPHONE HYDROCHLORIDE 0.5 MILLIGRAM(S): 2 INJECTION INTRAMUSCULAR; INTRAVENOUS; SUBCUTANEOUS at 19:15

## 2020-09-08 RX ADMIN — PANTOPRAZOLE SODIUM 40 MILLIGRAM(S): 20 TABLET, DELAYED RELEASE ORAL at 05:51

## 2020-09-08 RX ADMIN — MORPHINE SULFATE 2 MILLIGRAM(S): 50 CAPSULE, EXTENDED RELEASE ORAL at 07:29

## 2020-09-08 RX ADMIN — OXYCODONE AND ACETAMINOPHEN 2 TABLET(S): 5; 325 TABLET ORAL at 05:52

## 2020-09-08 RX ADMIN — ONDANSETRON 4 MILLIGRAM(S): 8 TABLET, FILM COATED ORAL at 06:53

## 2020-09-08 RX ADMIN — MORPHINE SULFATE 2 MILLIGRAM(S): 50 CAPSULE, EXTENDED RELEASE ORAL at 14:25

## 2020-09-08 RX ADMIN — Medication 4 MILLIGRAM(S): at 05:51

## 2020-09-08 RX ADMIN — OXYCODONE AND ACETAMINOPHEN 2 TABLET(S): 5; 325 TABLET ORAL at 18:15

## 2020-09-08 RX ADMIN — Medication 0.5 MILLIGRAM(S): at 12:12

## 2020-09-08 RX ADMIN — Medication 4 MILLIGRAM(S): at 14:04

## 2020-09-08 RX ADMIN — HYDROMORPHONE HYDROCHLORIDE 0.5 MILLIGRAM(S): 2 INJECTION INTRAMUSCULAR; INTRAVENOUS; SUBCUTANEOUS at 19:00

## 2020-09-08 RX ADMIN — Medication 2 MILLIGRAM(S): at 07:55

## 2020-09-08 RX ADMIN — Medication 4 MILLIGRAM(S): at 21:58

## 2020-09-08 NOTE — PROGRESS NOTE ADULT - SUBJECTIVE AND OBJECTIVE BOX
Patient is a 25y old  Female who presents with a chief complaint of Worsening back pain (08 Sep 2020 05:30)      SUBJECTIVE / OVERNIGHT EVENTS:  Pt seen and examined at bedside. Spoke to both pt and mother on speaker phone. Frustrated because she spoke to [facility] service today who recommend a discectomy, but she wants her chronic back pain, degenerative disc disease to be addressed as well. Requesting second opinion with ortho as well as patient advocate services.    ADDITIONAL REVIEW OF SYSTEMS:  CONSTITUTIONAL: No weakness, fevers or chills  EYES/ENT: No visual changes;  No vertigo or throat pain   NECK: No pain or stiffness  RESPIRATORY: No cough, wheezing, hemoptysis; No shortness of breath  CARDIOVASCULAR: No chest pain or palpitations  GASTROINTESTINAL: No abdominal or epigastric pain. No nausea, vomiting, or hematemesis; No diarrhea or constipation. No melena or hematochezia.  GENITOURINARY: No dysuria, frequency or hematuria; +intermittent urinary retention  NEUROLOGICAL: No numbness or weakness  SKIN: No itching, rashes  GENITOURINARY: +back pain, +leg weakness    MEDICATIONS  (STANDING):  dexAMETHasone  Injectable 4 milliGRAM(s) IV Push every 8 hours  diazepam    Tablet 2 milliGRAM(s) Oral daily  diazepam    Tablet 5 milliGRAM(s) Oral at bedtime  heparin   Injectable 5000 Unit(s) SubCutaneous every 8 hours  influenza   Vaccine 0.5 milliLiter(s) IntraMuscular once  pantoprazole    Tablet 40 milliGRAM(s) Oral before breakfast    MEDICATIONS  (PRN):  ALPRAZolam 0.5 milliGRAM(s) Oral every 6 hours PRN anxiety  morphine  - Injectable 2 milliGRAM(s) IV Push every 4 hours PRN breakthrough  oxycodone    5 mG/acetaminophen 325 mG 2 Tablet(s) Oral every 6 hours PRN Severe Pain (7 - 10)      CAPILLARY BLOOD GLUCOSE        I&O's Summary    07 Sep 2020 07:01  -  08 Sep 2020 07:00  --------------------------------------------------------  IN: 1350 mL / OUT: 0 mL / NET: 1350 mL    08 Sep 2020 07:01  -  08 Sep 2020 15:17  --------------------------------------------------------  IN: 620 mL / OUT: 200 mL / NET: 420 mL        PHYSICAL EXAM:  Vital Signs Last 24 Hrs  T(C): 36.8 (08 Sep 2020 08:43), Max: 36.8 (07 Sep 2020 17:03)  T(F): 98.3 (08 Sep 2020 08:43), Max: 98.3 (08 Sep 2020 08:43)  HR: 74 (08 Sep 2020 08:43) (67 - 78)  BP: 142/96 (08 Sep 2020 08:43) (124/88 - 142/96)  BP(mean): --  RR: 17 (08 Sep 2020 08:43) (17 - 18)  SpO2: 98% (08 Sep 2020 08:43) (94% - 98%)    CONSTITUTIONAL: NAD, well-developed, well-groomed  EYES: PERRLA; conjunctiva and sclera clear  ENMT: Moist oral mucosa, no pharyngeal injection or exudates; normal dentition  NECK: Supple, no palpable masses; no thyromegaly  RESPIRATORY: Normal respiratory effort; lungs are clear to auscultation bilaterally  CARDIOVASCULAR: Regular rate and rhythm, normal S1 and S2, no murmur/rub/gallop; No lower extremity edema; Peripheral pulses are 2+ bilaterally  ABDOMEN: Nontender to palpation, normoactive bowel sounds, no rebound/guarding  MUSCULOSKELETAL: +back pain, decreased ROM  PSYCH: A+O to person, place, and time; affect appropriate  NEUROLOGY: no gross sensory deficits   SKIN: No rashes; no palpable lesions    LABS:                        14.4   16.58 )-----------( 417      ( 08 Sep 2020 07:06 )             42.2     09-08    137  |  96  |  20  ----------------------------<  117<H>  4.2   |  24  |  0.53    Ca    9.7      08 Sep 2020 07:06      PT/INR - ( 08 Sep 2020 07:06 )   PT: 10.6 sec;   INR: 0.89 ratio         PTT - ( 08 Sep 2020 07:06 )  PTT:27.8 sec

## 2020-09-08 NOTE — CHART NOTE - NSCHARTNOTEFT_GEN_A_CORE
patient reporting pain relief with morphine that's lasting only for 1 hour. Discussed with Dr. Moore - will try Dilaudid 0.5 mg IV x 1 and monitor effect    [id number]

## 2020-09-08 NOTE — PROGRESS NOTE ADULT - ATTENDING COMMENTS
Patient advocate called
Pt. has a large HNP at L4-5 .  Appropriate surgery would involve L4-5 discectomy.  I see no indication for fusion.  Pt. is demanding a fusion.  I suggested that she get another opinion.

## 2020-09-08 NOTE — PROGRESS NOTE ADULT - SUBJECTIVE AND OBJECTIVE BOX
Patient seen and examined at bedside.    Exam aox3, left leg hf/kf/ke 3/5 , df/pf 3/5, right leg 4/5 throughout , uppers wnl     MRL 9/4: large central disc L4-5 w/ severe canal stensosis, smaller disc at L3-4    CTL 9/4: sacralized L5, large central disc L4-5 w/ severe canal stensosis, smaller disc at L3-4    Flexion extension XR: No clear subluxation/translation

## 2020-09-08 NOTE — CONSULT NOTE ADULT - SUBJECTIVE AND OBJECTIVE BOX
Patient is a 25y Female who presents c/o chronic low back and shooting pain down L leg for multiple years. Patient states her symptoms have been getting worse over the last 8 weeks.  Denies any new trauma. Patient has seen Dr. Bray as an outpatient in 2019 for the low back pain. On this admission, patient was found to have a large central L4/5 disc herniation. She was evaluated by neurosurgery who recommended a L4/5 discectomy. However, patient is requesting to have a spinal fusion due to her back pain, and is requesting a second opinion by Dr. Bray. Endorses numbness/tingling/paresthesias/ and weakness of LLE. Denies bowel/bladder incontinence. Denies fevers/chills. No other complaints at this time.    HEALTH ISSUES - PROBLEM Dx:  GERD (gastroesophageal reflux disease): GERD (gastroesophageal reflux disease)  Acute exacerbation of chronic low back pain: Acute exacerbation of chronic low back pain          MEDICATIONS  (STANDING):  dexAMETHasone  Injectable 4 milliGRAM(s) IV Push every 8 hours  diazepam    Tablet 2 milliGRAM(s) Oral daily  diazepam    Tablet 5 milliGRAM(s) Oral at bedtime  heparin   Injectable 5000 Unit(s) SubCutaneous every 8 hours  influenza   Vaccine 0.5 milliLiter(s) IntraMuscular once  pantoprazole    Tablet 40 milliGRAM(s) Oral before breakfast      Allergies    penicillins (Rash)  sulfa drugs (Rash)    Intolerances        PAST MEDICAL & SURGICAL HISTORY:  GERD (gastroesophageal reflux disease)  History of tonsillectomy                            14.4   16.58 )-----------( 417      ( 08 Sep 2020 07:06 )             42.2       08 Sep 2020 07:06    137    |  96     |  20     ----------------------------<  117    4.2     |  24     |  0.53     Ca    9.7        08 Sep 2020 07:06        PT/INR - ( 08 Sep 2020 07:06 )   PT: 10.6 sec;   INR: 0.89 ratio         PTT - ( 08 Sep 2020 07:06 )  PTT:27.8 sec        Vital Signs Last 24 Hrs  T(C): 36.3 (09-08-20 @ 16:22), Max: 36.8 (09-07-20 @ 17:03)  T(F): 97.3 (09-08-20 @ 16:22), Max: 98.3 (09-08-20 @ 08:43)  HR: 69 (09-08-20 @ 16:22) (67 - 78)  BP: 125/85 (09-08-20 @ 16:22) (124/88 - 142/96)  BP(mean): --  RR: 17 (09-08-20 @ 16:22) (17 - 18)  SpO2: 95% (09-08-20 @ 16:22) (94% - 98%)    Gen: NAD    Spine PE:  Skin intact  No gross deformity  No midnline TTP C/T/L/S spine  No bony step offs  No paraspinal muscle ttp/hypertonicity   Negative Straight leg raise  Negative clonus  Negative babinski  Negative pool  + rectal tone  No saddle anesthesia    Motor:                   C5                C6              C7               C8           T1   R            5/5                5/5            5/5             5/5          5/5  L             5/5               5/5             5/5             5/5          5/5                L2             L3             L4               L5            S1  R         5/5           5/5          5/5             5/5           5/5  L          5/5          5/5           3/5             3/5           5/5    Sensory:            C5         C6         C7      C8       T1        (0=absent, 1=impaired, 2=normal, NT=not testable)  R         2            2           2        2         2  L          2            2           2        2         2               L2          L3         L4      L5       S1         (0=absent, 1=impaired, 2=normal, NT=not testable)  R         2            2            2        2        2  L          2            2           2        2         2    Imaging:  < from: MR Lumbar Spine No Cont (09.04.20 @ 16:36) >  IMPRESSION:    1. Lumbosacral transitional anatomy with sacralization of the L5 vertebral body. Please see discussion in the body of the report for vertebral body numbering.    2. Large central disc herniation at the L4-L5 level with compression of the descending nerve roots and associated severe canal stenosis.    3. Additional smaller disc herniation at the L3-L4 level.    4. Epidural lipomatosis.        A/P: 25y Female with chronic back pain and LLE radiculopathy with large L4/5 central disc herniation. She was offered L4/5 discectomy by neurosurgery. Patient is asking for second opinion by Dr. Bray.  Dr. Bray to evaluate patient and make recommendations regarding surgery  Continue with Decadron  Pain control  PT/OT  DVT ppx

## 2020-09-09 ENCOUNTER — APPOINTMENT (OUTPATIENT)
Dept: SPINE | Facility: HOSPITAL | Age: 25
End: 2020-09-09

## 2020-09-09 DIAGNOSIS — F41.9 ANXIETY DISORDER, UNSPECIFIED: ICD-10-CM

## 2020-09-09 LAB
BASOPHILS # BLD AUTO: 0.07 K/UL — SIGNIFICANT CHANGE UP (ref 0–0.2)
BASOPHILS NFR BLD AUTO: 0.4 % — SIGNIFICANT CHANGE UP (ref 0–2)
EOSINOPHIL # BLD AUTO: 0 K/UL — SIGNIFICANT CHANGE UP (ref 0–0.5)
EOSINOPHIL NFR BLD AUTO: 0 % — SIGNIFICANT CHANGE UP (ref 0–6)
HCT VFR BLD CALC: 42.3 % — SIGNIFICANT CHANGE UP (ref 34.5–45)
HGB BLD-MCNC: 14.5 G/DL — SIGNIFICANT CHANGE UP (ref 11.5–15.5)
IMM GRANULOCYTES NFR BLD AUTO: 2.8 % — HIGH (ref 0–1.5)
LYMPHOCYTES # BLD AUTO: 1.5 K/UL — SIGNIFICANT CHANGE UP (ref 1–3.3)
LYMPHOCYTES # BLD AUTO: 9.2 % — LOW (ref 13–44)
MCHC RBC-ENTMCNC: 30.9 PG — SIGNIFICANT CHANGE UP (ref 27–34)
MCHC RBC-ENTMCNC: 34.3 GM/DL — SIGNIFICANT CHANGE UP (ref 32–36)
MCV RBC AUTO: 90 FL — SIGNIFICANT CHANGE UP (ref 80–100)
MONOCYTES # BLD AUTO: 0.94 K/UL — HIGH (ref 0–0.9)
MONOCYTES NFR BLD AUTO: 5.8 % — SIGNIFICANT CHANGE UP (ref 2–14)
NEUTROPHILS # BLD AUTO: 13.28 K/UL — HIGH (ref 1.8–7.4)
NEUTROPHILS NFR BLD AUTO: 81.8 % — HIGH (ref 43–77)
NRBC # BLD: 0 /100 WBCS — SIGNIFICANT CHANGE UP (ref 0–0)
PLATELET # BLD AUTO: 440 K/UL — HIGH (ref 150–400)
RBC # BLD: 4.7 M/UL — SIGNIFICANT CHANGE UP (ref 3.8–5.2)
RBC # FLD: 12.3 % — SIGNIFICANT CHANGE UP (ref 10.3–14.5)
WBC # BLD: 16.25 K/UL — HIGH (ref 3.8–10.5)
WBC # FLD AUTO: 16.25 K/UL — HIGH (ref 3.8–10.5)

## 2020-09-09 PROCEDURE — 93306 TTE W/DOPPLER COMPLETE: CPT | Mod: 26

## 2020-09-09 PROCEDURE — 99232 SBSQ HOSP IP/OBS MODERATE 35: CPT

## 2020-09-09 PROCEDURE — 99231 SBSQ HOSP IP/OBS SF/LOW 25: CPT

## 2020-09-09 RX ORDER — NICOTINE POLACRILEX 2 MG
1 GUM BUCCAL DAILY
Refills: 0 | Status: DISCONTINUED | OUTPATIENT
Start: 2020-09-09 | End: 2020-09-10

## 2020-09-09 RX ORDER — MORPHINE SULFATE 50 MG/1
2 CAPSULE, EXTENDED RELEASE ORAL ONCE
Refills: 0 | Status: DISCONTINUED | OUTPATIENT
Start: 2020-09-09 | End: 2020-09-09

## 2020-09-09 RX ORDER — TRAZODONE HCL 50 MG
50 TABLET ORAL ONCE
Refills: 0 | Status: COMPLETED | OUTPATIENT
Start: 2020-09-09 | End: 2020-09-09

## 2020-09-09 RX ORDER — HYDROMORPHONE HYDROCHLORIDE 2 MG/ML
0.5 INJECTION INTRAMUSCULAR; INTRAVENOUS; SUBCUTANEOUS EVERY 4 HOURS
Refills: 0 | Status: DISCONTINUED | OUTPATIENT
Start: 2020-09-09 | End: 2020-09-10

## 2020-09-09 RX ORDER — HYDROMORPHONE HYDROCHLORIDE 2 MG/ML
0.25 INJECTION INTRAMUSCULAR; INTRAVENOUS; SUBCUTANEOUS EVERY 4 HOURS
Refills: 0 | Status: DISCONTINUED | OUTPATIENT
Start: 2020-09-09 | End: 2020-09-09

## 2020-09-09 RX ADMIN — OXYCODONE AND ACETAMINOPHEN 2 TABLET(S): 5; 325 TABLET ORAL at 14:53

## 2020-09-09 RX ADMIN — MORPHINE SULFATE 2 MILLIGRAM(S): 50 CAPSULE, EXTENDED RELEASE ORAL at 10:38

## 2020-09-09 RX ADMIN — Medication 1 PATCH: at 23:02

## 2020-09-09 RX ADMIN — OXYCODONE AND ACETAMINOPHEN 2 TABLET(S): 5; 325 TABLET ORAL at 08:36

## 2020-09-09 RX ADMIN — Medication 4 MILLIGRAM(S): at 10:22

## 2020-09-09 RX ADMIN — HYDROMORPHONE HYDROCHLORIDE 0.5 MILLIGRAM(S): 2 INJECTION INTRAMUSCULAR; INTRAVENOUS; SUBCUTANEOUS at 15:53

## 2020-09-09 RX ADMIN — Medication 0.5 MILLIGRAM(S): at 18:06

## 2020-09-09 RX ADMIN — MORPHINE SULFATE 2 MILLIGRAM(S): 50 CAPSULE, EXTENDED RELEASE ORAL at 10:23

## 2020-09-09 RX ADMIN — MORPHINE SULFATE 2 MILLIGRAM(S): 50 CAPSULE, EXTENDED RELEASE ORAL at 06:24

## 2020-09-09 RX ADMIN — HYDROMORPHONE HYDROCHLORIDE 0.5 MILLIGRAM(S): 2 INJECTION INTRAMUSCULAR; INTRAVENOUS; SUBCUTANEOUS at 16:08

## 2020-09-09 RX ADMIN — Medication 0.5 MILLIGRAM(S): at 07:03

## 2020-09-09 RX ADMIN — Medication 4 MILLIGRAM(S): at 18:03

## 2020-09-09 RX ADMIN — HYDROMORPHONE HYDROCHLORIDE 0.5 MILLIGRAM(S): 2 INJECTION INTRAMUSCULAR; INTRAVENOUS; SUBCUTANEOUS at 20:30

## 2020-09-09 RX ADMIN — MORPHINE SULFATE 2 MILLIGRAM(S): 50 CAPSULE, EXTENDED RELEASE ORAL at 06:55

## 2020-09-09 RX ADMIN — MORPHINE SULFATE 2 MILLIGRAM(S): 50 CAPSULE, EXTENDED RELEASE ORAL at 00:20

## 2020-09-09 RX ADMIN — HYDROMORPHONE HYDROCHLORIDE 0.5 MILLIGRAM(S): 2 INJECTION INTRAMUSCULAR; INTRAVENOUS; SUBCUTANEOUS at 20:00

## 2020-09-09 RX ADMIN — Medication 4 MILLIGRAM(S): at 21:21

## 2020-09-09 RX ADMIN — Medication 2 MILLIGRAM(S): at 11:30

## 2020-09-09 RX ADMIN — Medication 5 MILLIGRAM(S): at 21:20

## 2020-09-09 RX ADMIN — OXYCODONE AND ACETAMINOPHEN 2 TABLET(S): 5; 325 TABLET ORAL at 08:06

## 2020-09-09 RX ADMIN — OXYCODONE AND ACETAMINOPHEN 2 TABLET(S): 5; 325 TABLET ORAL at 14:23

## 2020-09-09 RX ADMIN — Medication 50 MILLIGRAM(S): at 23:02

## 2020-09-09 NOTE — CHART NOTE - NSCHARTNOTEFT_GEN_A_CORE
Notified by Rn Pt c/o of insomnia. Pt seen by behavioral health, recommending trazodone 50 mg prn qhs. Will administer trazodone 50mg x1 and monitor overnight, to endorse events to the day team.

## 2020-09-09 NOTE — PROGRESS NOTE BEHAVIORAL HEALTH - NSBHCONSULTMEDS_PSY_A_CORE FT
can give [id number] poqhs prn insomnia. cont  xanax prn
66 year old male w hx of NIDDM2 (currently rx'd 5 mg QD Glipizide, but takes 2.5 mg now, and 1000 mg BID Metformin), HTN, HLD presents to the ED for evaluation of intermittent episodes of generalized weakness, shaking, and diaphoresis, on/off dizziness, intermittent mid sternal cp, associated with hypoglycemia x 5 days. Pt admits to similar episode a couple months ago, started decreasing his Glipizide to 2.5 mg BID. CP and dizziness going on for weeks now. Episodes most often occur at night after meals around 10 pm. Tonight patient had dinner, 1 hour later he felt symptomatic and his blood sugar was "too low to read" on his glucometer (has the dexcom melba with implantable glucose meter in abdomen). Pt drank OJ and later had a reading of 107. Pt otherwise denies fevers/chills, cough, shortness of breath, abd pain, nausea, vomiting, diarrhea, back/flank pain,  complaints, rash, change in diet/exercise/stress/sleep. Does work nights 2 x a week. Was not at work tonight. On average, for past 3 months, melba says blood glucose is 147. The chest Pain is not pleuritic, positional, or worse with exertion. Last cardiac workup - nuc stress 2015, normal. Exam is normal  (agree with PA note). Will check labs, ekg, xray and place in OBS. Likely the low blood glucose is medication related, and will hold meds tonight. But given other complaints will need ACS workup while here. Place in OBS if first trop neg. Upon dc from OBS will need Endocrinology referral

## 2020-09-09 NOTE — PROGRESS NOTE ADULT - SUBJECTIVE AND OBJECTIVE BOX
Patient is a 25y old  Female who presents with a chief complaint of Worsening back pain (08 Sep 2020 05:30)      SUBJECTIVE / OVERNIGHT EVENTS:  Pt seen and examined at bedside. Overnight pt reportedly exhibited behavioral disturbances based on having a roommate, stating thjat she doesnt like to be woken up at night, and is requesting a different room. Also requesting to be placed on dilaudid and morphine so she can "see which one works better". Otherwise no new findings on ROS. Was seen sitting up in bed eating this AM and did not express pain at the moment. Reports regular BMs.    ADDITIONAL REVIEW OF SYSTEMS:  CONSTITUTIONAL: No fevers or chills  EYES/ENT: No visual changes;  No vertigo or throat pain   NECK: No pain or stiffness  RESPIRATORY: No cough, wheezing, hemoptysis; No shortness of breath  CARDIOVASCULAR: No chest pain or palpitations  GASTROINTESTINAL: No abdominal or epigastric pain. No nausea, vomiting, or hematemesis; No diarrhea or constipation. No melena or hematochezia.  GENITOURINARY: No dysuria, frequency or hematuria  SKIN: No itching, rashes  GENITOURINARY: +back pain, +leg weakness    MEDICATIONS  (STANDING):  dexAMETHasone  Injectable 4 milliGRAM(s) IV Push every 8 hours  diazepam    Tablet 2 milliGRAM(s) Oral daily  diazepam    Tablet 5 milliGRAM(s) Oral at bedtime  heparin   Injectable 5000 Unit(s) SubCutaneous every 8 hours  influenza   Vaccine 0.5 milliLiter(s) IntraMuscular once  pantoprazole    Tablet 40 milliGRAM(s) Oral before breakfast    MEDICATIONS  (PRN):  ALPRAZolam 0.5 milliGRAM(s) Oral every 6 hours PRN anxiety  morphine  - Injectable 2 milliGRAM(s) IV Push every 4 hours PRN breakthrough  oxycodone    5 mG/acetaminophen 325 mG 2 Tablet(s) Oral every 6 hours PRN Severe Pain (7 - 10)      PHYSICAL EXAM:  Vital Signs Last 24 Hrs  T(C): 36.8 (08 Sep 2020 08:43), Max: 36.8 (07 Sep 2020 17:03)  T(F): 98.3 (08 Sep 2020 08:43), Max: 98.3 (08 Sep 2020 08:43)  HR: 74 (08 Sep 2020 08:43) (67 - 78)  BP: 142/96 (08 Sep 2020 08:43) (124/88 - 142/96)  BP(mean): --  RR: 17 (08 Sep 2020 08:43) (17 - 18)  SpO2: 98% (08 Sep 2020 08:43) (94% - 98%)    CONSTITUTIONAL: NAD, well-developed, well-groomed  EYES: PERRLA; conjunctiva and sclera clear  ENMT: Moist oral mucosa, no pharyngeal injection or exudates; normal dentition  NECK: Supple, no palpable masses; no thyromegaly  RESPIRATORY: Normal respiratory effort; lungs are clear to auscultation bilaterally  CARDIOVASCULAR: Regular rate and rhythm, normal S1 and S2, no murmur/rub/gallop; No lower extremity edema; Peripheral pulses are 2+ bilaterally  ABDOMEN: Nontender to palpation, normoactive bowel sounds, no rebound/guarding  MUSCULOSKELETAL: +back pain, decreased ROM  PSYCH: A+O to person, place, and time; affect appropriate  NEUROLOGY: no gross sensory deficits   SKIN: No rashes; no palpable lesions    LABS:  No new labs or imaging to review [facility] Division of Hospital Medicine  Gail Moore MD  Pager (M-F, 8A-5P): [phone]  Other Times:  [phone]    Patient is a 25y old  Female who presents with a chief complaint of Worsening back pain (08 Sep 2020 05:30)      SUBJECTIVE / OVERNIGHT EVENTS:  Pt seen and examined at bedside. Overnight pt reportedly exhibited behavioral disturbances based on having a roommate, stating thjat she doesnt like to be woken up at night, and is requesting a different room. Also requesting to be placed on dilaudid and morphine so she can "see which one works better". Otherwise no new findings on ROS. Was seen sitting up in bed eating this AM and did not express pain at the moment. Reports regular BMs.    ADDITIONAL REVIEW OF SYSTEMS:  CONSTITUTIONAL: No fevers or chills  EYES/ENT: No visual changes;  No vertigo or throat pain   NECK: No pain or stiffness  RESPIRATORY: No cough, wheezing, hemoptysis; No shortness of breath  CARDIOVASCULAR: No chest pain or palpitations  GASTROINTESTINAL: No abdominal or epigastric pain. No nausea, vomiting, or hematemesis; No diarrhea or constipation. No melena or hematochezia.  GENITOURINARY: No dysuria, frequency or hematuria  SKIN: No itching, rashes  GENITOURINARY: +back pain, +leg weakness    MEDICATIONS  (STANDING):  dexAMETHasone  Injectable 4 milliGRAM(s) IV Push every 8 hours  diazepam    Tablet 2 milliGRAM(s) Oral daily  diazepam    Tablet 5 milliGRAM(s) Oral at bedtime  heparin   Injectable 5000 Unit(s) SubCutaneous every 8 hours  influenza   Vaccine 0.5 milliLiter(s) IntraMuscular once  pantoprazole    Tablet 40 milliGRAM(s) Oral before breakfast    MEDICATIONS  (PRN):  ALPRAZolam 0.5 milliGRAM(s) Oral every 6 hours PRN anxiety  morphine  - Injectable 2 milliGRAM(s) IV Push every 4 hours PRN breakthrough  oxycodone    5 mG/acetaminophen 325 mG 2 Tablet(s) Oral every 6 hours PRN Severe Pain (7 - 10)      PHYSICAL EXAM:  Vital Signs Last 24 Hrs  T(C): 36.8 (08 Sep 2020 08:43), Max: 36.8 (07 Sep 2020 17:03)  T(F): 98.3 (08 Sep 2020 08:43), Max: 98.3 (08 Sep 2020 08:43)  HR: 74 (08 Sep 2020 08:43) (67 - 78)  BP: 142/96 (08 Sep 2020 08:43) (124/88 - 142/96)  BP(mean): --  RR: 17 (08 Sep 2020 08:43) (17 - 18)  SpO2: 98% (08 Sep 2020 08:43) (94% - 98%)    CONSTITUTIONAL: NAD, well-developed, well-groomed  EYES: PERRLA; conjunctiva and sclera clear  ENMT: Moist oral mucosa, no pharyngeal injection or exudates; normal dentition  NECK: Supple, no palpable masses; no thyromegaly  RESPIRATORY: Normal respiratory effort; lungs are clear to auscultation bilaterally  CARDIOVASCULAR: Regular rate and rhythm, normal S1 and S2, no murmur/rub/gallop; No lower extremity edema; Peripheral pulses are 2+ bilaterally  ABDOMEN: Nontender to palpation, normoactive bowel sounds, no rebound/guarding  MUSCULOSKELETAL: +back pain, decreased ROM  PSYCH: A+O to person, place, and time; affect appropriate  NEUROLOGY: no gross sensory deficits   SKIN: No rashes; no palpable lesions    LABS:  No new labs or imaging to review

## 2020-09-09 NOTE — PROVIDER CONTACT NOTE (OTHER) - BACKGROUND
24 yo F with endometriosis, reflux, pre-HOCM, chronic back pain and left leg pain who presents with acute worsening of the pain over the past 6 days to the point where she is unable to ambulate.

## 2020-09-09 NOTE — PROVIDER CONTACT NOTE (OTHER) - SITUATION
Patient while in ECHO told the technician that she was fearful of going home.  She did not elaborate

## 2020-09-09 NOTE — CHART NOTE - NSCHARTNOTEFT_GEN_A_CORE
Notified by RN pt is agitated. Pt seen and examined at bedside, pt c/o not being able to sleep and receiving a roommate in the middle of the night. Pt threatening to sign out AMA. Pt offered meds for her anxiety, refusing to take xanax or additional valium. Pt requesting to see levi. To endorse events to the day team, levi eval in the AM. Notified by RN pt is agitated. Pt seen and examined at bedside, pt c/o not being able to sleep and receiving a roommate in the middle of the night. Pt threatening to sign out AMA. Pt offered meds for her anxiety, refusing to take xanax or additional valium. Pt requesting to see pysch and switch rooms. Administration called to see pt, explained to pt there are no open rooms. Pt agreeing to stay after conversation with administration. To endorse events to the day team, Psych eval in the AM. Notified by RN pt is agitated. Pt seen and examined at bedside, pt c/o not being able to sleep and receiving a roommate in the middle of the night. Pt threatening to sign out AMA. Pt offered meds for her anxiety and for sleep aid, refusing to take xanax or additional valium. Pt requesting to see pysch and switch rooms. Administration called to see pt, explained to pt there are no open rooms. Pt agreeing to stay after conversation with administration. Pt denies suicidal ideation, but c/o having manic attacks 2ry to pain. To endorse events to the day team, Psych eval in the AM.

## 2020-09-09 NOTE — PROGRESS NOTE BEHAVIORAL HEALTH - NSBHFUPINTERVALHXFT_PSY_A_CORE
team requested psych f/u for possible kenton, insomnia. pt states she has not been able to sleep past few nights,  due to noise, people coming into her room. pt denies panic attacks. pt expressed anxiety due to her upcoming surgery, chronic pain. denies depression,no si/hi. pt denies psychosis,  kenton. pt has been taking  xanax prn for anxiety,helpful

## 2020-09-09 NOTE — PROGRESS NOTE ADULT - SUBJECTIVE AND OBJECTIVE BOX
Pt S/E at bedside, in distress sitting in chair, unable to sleep or get into bed secondary to pain, awaiting second opinion from Dr. Bray    Vital Signs Last 24 Hrs  T(C): 36.8 (09-08-20 @ 21:52), Max: 36.8 (09-08-20 @ 08:43)  T(F): 98.3 (09-08-20 @ 21:52), Max: 98.3 (09-08-20 @ 08:43)  HR: 73 (09-08-20 @ 21:52) (69 - 74)  BP: 114/71 (09-08-20 @ 21:52) (114/71 - 142/96)  BP(mean): --  RR: 17 (09-08-20 @ 21:52) (17 - 17)  SpO2: 95% (09-08-20 @ 21:52) (95% - 98%)    Gen: NAD    Spine:  Skin intact  +EHL/FHL/TA/GS  +AIN/PIN/M/R/U/Msc/Ax  SILT L3-S1  SILT C5-T1  +DP/PT Pulses  +Radial Pulse  Compartments soft  No calf TTP B/L

## 2020-09-09 NOTE — PROGRESS NOTE BEHAVIORAL HEALTH - NSBHCHARTREVIEWLAB_PSY_A_CORE FT
14.5   16.25 )-----------( 440      ( 09 Sep 2020 12:48 )             42.3     09-08    137  |  96  |  20  ----------------------------<  117<H>  4.2   |  24  |  0.53    Ca    9.7      08 Sep 2020 07:06

## 2020-09-09 NOTE — PROVIDER CONTACT NOTE (OTHER) - ACTION/TREATMENT ORDERED:
Psych coming back to see patient  SW Consult made by TYLER Felder
MD Jeffrey Soto at bedside to assess patient. administration made aware and at bedside.
Mayco ANGLIN at bedside, Cheryle CAMARENA from nsx also at bedside to assess pt. Multiple meds ordered overnight for pain& spasms (see emar) with no relief. Valium now ordered and decadron changed from po to iv.
awaiting orders

## 2020-09-09 NOTE — PROGRESS NOTE BEHAVIORAL HEALTH - NSBHCHARTREVIEWVS_PSY_A_CORE FT
Vital Signs Last 24 Hrs  T(C): 36.7 (09 Sep 2020 20:10), Max: 36.8 (08 Sep 2020 21:52)  T(F): 98 (09 Sep 2020 20:10), Max: 98.3 (08 Sep 2020 21:52)  HR: 83 (09 Sep 2020 20:10) (65 - 83)  BP: 133/87 (09 Sep 2020 20:10) (114/71 - 146/89)  BP(mean): --  RR: 17 (09 Sep 2020 20:10) (17 - 17)  SpO2: 95% (09 Sep 2020 20:10) (95% - 96%)

## 2020-09-09 NOTE — PROVIDER CONTACT NOTE (OTHER) - ASSESSMENT
pt. wants to leave AMA.  pt. extremely anxious and tearful. states, "I can't do this anymore. I fucking hate this place." pt. reassured, and educated on breathing exercises. pt. refusing care. pt. doesn't want medications, blood draws, or vitals done. pt. wants to leave AMA.  pt. extremely anxious and tearful. states, "I can't do this anymore. I fucking hate this place." pt. reassured, and educated on breathing exercises. pt. refusing care. pt. doesn't want medications, blood draws, or vitals done. pt. frustrated that she has roommate. pt. wants to leave AMA.  pt. extremely anxious and tearful. states, "I can't do this anymore. I fucking hate this place." pt. reassured, and educated on breathing exercises. pt. refusing care. pt. refuse pain meds & anxiety meds. pt. doesn't want medications, blood draws, or vitals done. pt. frustrated that she has roommate.

## 2020-09-10 ENCOUNTER — TRANSCRIPTION ENCOUNTER (OUTPATIENT)
Age: 25
End: 2020-09-10

## 2020-09-10 ENCOUNTER — INPATIENT (INPATIENT)
Facility: HOSPITAL | Age: 25
LOS: 6 days | Discharge: HOME | End: 2020-09-17
Attending: STUDENT IN AN ORGANIZED HEALTH CARE EDUCATION/TRAINING PROGRAM | Admitting: STUDENT IN AN ORGANIZED HEALTH CARE EDUCATION/TRAINING PROGRAM
Payer: COMMERCIAL

## 2020-09-10 VITALS
DIASTOLIC BLOOD PRESSURE: 103 MMHG | SYSTOLIC BLOOD PRESSURE: 147 MMHG | OXYGEN SATURATION: 96 % | TEMPERATURE: 98 F | HEART RATE: 98 BPM | RESPIRATION RATE: 17 BRPM

## 2020-09-10 VITALS
HEART RATE: 87 BPM | RESPIRATION RATE: 17 BRPM | TEMPERATURE: 98 F | SYSTOLIC BLOOD PRESSURE: 125 MMHG | DIASTOLIC BLOOD PRESSURE: 70 MMHG | OXYGEN SATURATION: 96 %

## 2020-09-10 DIAGNOSIS — Z98.89 OTHER SPECIFIED POSTPROCEDURAL STATES: Chronic | ICD-10-CM

## 2020-09-10 DIAGNOSIS — M51.26 OTHER INTERVERTEBRAL DISC DISPLACEMENT, LUMBAR REGION: ICD-10-CM

## 2020-09-10 PROCEDURE — 85027 COMPLETE CBC AUTOMATED: CPT

## 2020-09-10 PROCEDURE — 93306 TTE W/DOPPLER COMPLETE: CPT

## 2020-09-10 PROCEDURE — U0003: CPT

## 2020-09-10 PROCEDURE — 87640 STAPH A DNA AMP PROBE: CPT

## 2020-09-10 PROCEDURE — 93010 ELECTROCARDIOGRAM REPORT: CPT

## 2020-09-10 PROCEDURE — 80048 BASIC METABOLIC PNL TOTAL CA: CPT

## 2020-09-10 PROCEDURE — 85025 COMPLETE CBC W/AUTO DIFF WBC: CPT

## 2020-09-10 PROCEDURE — 84702 CHORIONIC GONADOTROPIN TEST: CPT

## 2020-09-10 PROCEDURE — 87641 MR-STAPH DNA AMP PROBE: CPT

## 2020-09-10 PROCEDURE — 72131 CT LUMBAR SPINE W/O DYE: CPT

## 2020-09-10 PROCEDURE — 81001 URINALYSIS AUTO W/SCOPE: CPT

## 2020-09-10 PROCEDURE — 97161 PT EVAL LOW COMPLEX 20 MIN: CPT

## 2020-09-10 PROCEDURE — 99285 EMERGENCY DEPT VISIT HI MDM: CPT | Mod: 25

## 2020-09-10 PROCEDURE — 86901 BLOOD TYPING SEROLOGIC RH(D): CPT

## 2020-09-10 PROCEDURE — 72148 MRI LUMBAR SPINE W/O DYE: CPT

## 2020-09-10 PROCEDURE — 93005 ELECTROCARDIOGRAM TRACING: CPT

## 2020-09-10 PROCEDURE — 85730 THROMBOPLASTIN TIME PARTIAL: CPT

## 2020-09-10 PROCEDURE — 80053 COMPREHEN METABOLIC PANEL: CPT

## 2020-09-10 PROCEDURE — 96374 THER/PROPH/DIAG INJ IV PUSH: CPT

## 2020-09-10 PROCEDURE — 72114 X-RAY EXAM L-S SPINE BENDING: CPT

## 2020-09-10 PROCEDURE — 99232 SBSQ HOSP IP/OBS MODERATE 35: CPT

## 2020-09-10 PROCEDURE — 86850 RBC ANTIBODY SCREEN: CPT

## 2020-09-10 PROCEDURE — 86769 SARS-COV-2 COVID-19 ANTIBODY: CPT

## 2020-09-10 PROCEDURE — 85610 PROTHROMBIN TIME: CPT

## 2020-09-10 PROCEDURE — 86900 BLOOD TYPING SEROLOGIC ABO: CPT

## 2020-09-10 RX ORDER — HYDROMORPHONE HYDROCHLORIDE 2 MG/ML
0.5 INJECTION INTRAMUSCULAR; INTRAVENOUS; SUBCUTANEOUS EVERY 4 HOURS
Refills: 0 | Status: DISCONTINUED | OUTPATIENT
Start: 2020-09-10 | End: 2020-09-10

## 2020-09-10 RX ORDER — OXYCODONE HYDROCHLORIDE 5 MG/1
5 TABLET ORAL EVERY 4 HOURS
Refills: 0 | Status: DISCONTINUED | OUTPATIENT
Start: 2020-09-10 | End: 2020-09-10

## 2020-09-10 RX ORDER — SENNA PLUS 8.6 MG/1
2 TABLET ORAL AT BEDTIME
Refills: 0 | Status: DISCONTINUED | OUTPATIENT
Start: 2020-09-10 | End: 2020-09-17

## 2020-09-10 RX ORDER — DIAZEPAM 5 MG
1 TABLET ORAL
Qty: 0 | Refills: 0 | DISCHARGE
Start: 2020-09-10

## 2020-09-10 RX ORDER — DIAZEPAM 5 MG
5 TABLET ORAL AT BEDTIME
Refills: 0 | Status: DISCONTINUED | OUTPATIENT
Start: 2020-09-10 | End: 2020-09-11

## 2020-09-10 RX ORDER — ALPRAZOLAM 0.25 MG
1 TABLET ORAL
Qty: 0 | Refills: 0 | DISCHARGE
Start: 2020-09-10

## 2020-09-10 RX ORDER — OXYCODONE HYDROCHLORIDE 5 MG/1
5 TABLET ORAL EVERY 4 HOURS
Refills: 0 | Status: DISCONTINUED | OUTPATIENT
Start: 2020-09-10 | End: 2020-09-12

## 2020-09-10 RX ORDER — HEPARIN SODIUM 5000 [USP'U]/ML
7500 INJECTION INTRAVENOUS; SUBCUTANEOUS EVERY 8 HOURS
Refills: 0 | Status: DISCONTINUED | OUTPATIENT
Start: 2020-09-10 | End: 2020-09-11

## 2020-09-10 RX ORDER — ALPRAZOLAM 0.25 MG
0.5 TABLET ORAL EVERY 6 HOURS
Refills: 0 | Status: DISCONTINUED | OUTPATIENT
Start: 2020-09-10 | End: 2020-09-11

## 2020-09-10 RX ORDER — HEPARIN SODIUM 5000 [USP'U]/ML
5000 INJECTION INTRAVENOUS; SUBCUTANEOUS
Qty: 0 | Refills: 0 | DISCHARGE
Start: 2020-09-10

## 2020-09-10 RX ORDER — NICOTINE POLACRILEX 2 MG
4 GUM BUCCAL EVERY 4 HOURS
Refills: 0 | Status: DISCONTINUED | OUTPATIENT
Start: 2020-09-10 | End: 2020-09-17

## 2020-09-10 RX ORDER — ACETAMINOPHEN 500 MG
975 TABLET ORAL EVERY 8 HOURS
Refills: 0 | Status: DISCONTINUED | OUTPATIENT
Start: 2020-09-10 | End: 2020-09-11

## 2020-09-10 RX ORDER — OXYCODONE HYDROCHLORIDE 5 MG/1
2.5 TABLET ORAL EVERY 4 HOURS
Refills: 0 | Status: DISCONTINUED | OUTPATIENT
Start: 2020-09-10 | End: 2020-09-10

## 2020-09-10 RX ORDER — MAGNESIUM HYDROXIDE 400 MG/1
30 TABLET, CHEWABLE ORAL DAILY
Refills: 0 | Status: DISCONTINUED | OUTPATIENT
Start: 2020-09-10 | End: 2020-09-17

## 2020-09-10 RX ORDER — DIAZEPAM 5 MG
5 TABLET ORAL
Refills: 0 | Status: DISCONTINUED | OUTPATIENT
Start: 2020-09-10 | End: 2020-09-11

## 2020-09-10 RX ORDER — HYDROMORPHONE HYDROCHLORIDE 2 MG/ML
0.5 INJECTION INTRAMUSCULAR; INTRAVENOUS; SUBCUTANEOUS
Qty: 0 | Refills: 0 | DISCHARGE
Start: 2020-09-10

## 2020-09-10 RX ORDER — OXYCODONE HYDROCHLORIDE 5 MG/1
10 TABLET ORAL EVERY 4 HOURS
Refills: 0 | Status: DISCONTINUED | OUTPATIENT
Start: 2020-09-10 | End: 2020-09-12

## 2020-09-10 RX ORDER — IBUPROFEN 200 MG
4 TABLET ORAL
Qty: 0 | Refills: 0 | DISCHARGE

## 2020-09-10 RX ORDER — NICOTINE POLACRILEX 2 MG
1 GUM BUCCAL
Qty: 0 | Refills: 0 | DISCHARGE
Start: 2020-09-10

## 2020-09-10 RX ORDER — DEXAMETHASONE 0.5 MG/5ML
4 ELIXIR ORAL EVERY 8 HOURS
Refills: 0 | Status: DISCONTINUED | OUTPATIENT
Start: 2020-09-10 | End: 2020-09-12

## 2020-09-10 RX ORDER — DEXAMETHASONE 0.5 MG/5ML
4 ELIXIR ORAL
Qty: 0 | Refills: 0 | DISCHARGE
Start: 2020-09-10

## 2020-09-10 RX ORDER — OXYCODONE AND ACETAMINOPHEN 5; 325 MG/1; MG/1
2 TABLET ORAL EVERY 6 HOURS
Refills: 0 | Status: DISCONTINUED | OUTPATIENT
Start: 2020-09-10 | End: 2020-09-10

## 2020-09-10 RX ORDER — HYDROMORPHONE HYDROCHLORIDE 2 MG/ML
0.5 INJECTION INTRAMUSCULAR; INTRAVENOUS; SUBCUTANEOUS EVERY 4 HOURS
Refills: 0 | Status: DISCONTINUED | OUTPATIENT
Start: 2020-09-10 | End: 2020-09-11

## 2020-09-10 RX ORDER — PANTOPRAZOLE SODIUM 20 MG/1
1 TABLET, DELAYED RELEASE ORAL
Qty: 0 | Refills: 0 | DISCHARGE
Start: 2020-09-10

## 2020-09-10 RX ORDER — SODIUM CHLORIDE 9 MG/ML
1000 INJECTION INTRAMUSCULAR; INTRAVENOUS; SUBCUTANEOUS
Refills: 0 | Status: DISCONTINUED | OUTPATIENT
Start: 2020-09-10 | End: 2020-09-10

## 2020-09-10 RX ORDER — HEPARIN SODIUM 5000 [USP'U]/ML
5000 INJECTION INTRAVENOUS; SUBCUTANEOUS EVERY 8 HOURS
Refills: 0 | Status: DISCONTINUED | OUTPATIENT
Start: 2020-09-10 | End: 2020-09-10

## 2020-09-10 RX ORDER — PANTOPRAZOLE SODIUM 20 MG/1
40 TABLET, DELAYED RELEASE ORAL
Refills: 0 | Status: DISCONTINUED | OUTPATIENT
Start: 2020-09-10 | End: 2020-09-17

## 2020-09-10 RX ADMIN — OXYCODONE AND ACETAMINOPHEN 2 TABLET(S): 5; 325 TABLET ORAL at 17:21

## 2020-09-10 RX ADMIN — HYDROMORPHONE HYDROCHLORIDE 0.5 MILLIGRAM(S): 2 INJECTION INTRAMUSCULAR; INTRAVENOUS; SUBCUTANEOUS at 00:12

## 2020-09-10 RX ADMIN — Medication 4 MILLIGRAM(S): at 06:20

## 2020-09-10 RX ADMIN — HYDROMORPHONE HYDROCHLORIDE 0.5 MILLIGRAM(S): 2 INJECTION INTRAMUSCULAR; INTRAVENOUS; SUBCUTANEOUS at 15:04

## 2020-09-10 RX ADMIN — Medication 975 MILLIGRAM(S): at 22:13

## 2020-09-10 RX ADMIN — Medication 5 MILLIGRAM(S): at 22:13

## 2020-09-10 RX ADMIN — Medication 4 MILLIGRAM(S): at 13:14

## 2020-09-10 RX ADMIN — OXYCODONE AND ACETAMINOPHEN 2 TABLET(S): 5; 325 TABLET ORAL at 09:29

## 2020-09-10 RX ADMIN — HYDROMORPHONE HYDROCHLORIDE 0.5 MILLIGRAM(S): 2 INJECTION INTRAMUSCULAR; INTRAVENOUS; SUBCUTANEOUS at 23:50

## 2020-09-10 RX ADMIN — Medication 0.5 MILLIGRAM(S): at 14:37

## 2020-09-10 RX ADMIN — Medication 2 MILLIGRAM(S): at 08:07

## 2020-09-10 RX ADMIN — HYDROMORPHONE HYDROCHLORIDE 0.5 MILLIGRAM(S): 2 INJECTION INTRAMUSCULAR; INTRAVENOUS; SUBCUTANEOUS at 19:45

## 2020-09-10 RX ADMIN — HYDROMORPHONE HYDROCHLORIDE 0.5 MILLIGRAM(S): 2 INJECTION INTRAMUSCULAR; INTRAVENOUS; SUBCUTANEOUS at 06:51

## 2020-09-10 RX ADMIN — HYDROMORPHONE HYDROCHLORIDE 0.5 MILLIGRAM(S): 2 INJECTION INTRAMUSCULAR; INTRAVENOUS; SUBCUTANEOUS at 11:07

## 2020-09-10 RX ADMIN — HYDROMORPHONE HYDROCHLORIDE 0.5 MILLIGRAM(S): 2 INJECTION INTRAMUSCULAR; INTRAVENOUS; SUBCUTANEOUS at 06:21

## 2020-09-10 RX ADMIN — HYDROMORPHONE HYDROCHLORIDE 0.5 MILLIGRAM(S): 2 INJECTION INTRAMUSCULAR; INTRAVENOUS; SUBCUTANEOUS at 15:19

## 2020-09-10 RX ADMIN — OXYCODONE AND ACETAMINOPHEN 2 TABLET(S): 5; 325 TABLET ORAL at 10:00

## 2020-09-10 RX ADMIN — Medication 4 MILLIGRAM(S): at 23:33

## 2020-09-10 RX ADMIN — PANTOPRAZOLE SODIUM 40 MILLIGRAM(S): 20 TABLET, DELAYED RELEASE ORAL at 06:20

## 2020-09-10 RX ADMIN — Medication 1 PATCH: at 10:32

## 2020-09-10 RX ADMIN — HYDROMORPHONE HYDROCHLORIDE 0.5 MILLIGRAM(S): 2 INJECTION INTRAMUSCULAR; INTRAVENOUS; SUBCUTANEOUS at 11:22

## 2020-09-10 RX ADMIN — Medication 1 PATCH: at 13:14

## 2020-09-10 RX ADMIN — HYDROMORPHONE HYDROCHLORIDE 0.5 MILLIGRAM(S): 2 INJECTION INTRAMUSCULAR; INTRAVENOUS; SUBCUTANEOUS at 00:42

## 2020-09-10 RX ADMIN — HYDROMORPHONE HYDROCHLORIDE 0.5 MILLIGRAM(S): 2 INJECTION INTRAMUSCULAR; INTRAVENOUS; SUBCUTANEOUS at 19:29

## 2020-09-10 RX ADMIN — HYDROMORPHONE HYDROCHLORIDE 0.5 MILLIGRAM(S): 2 INJECTION INTRAMUSCULAR; INTRAVENOUS; SUBCUTANEOUS at 23:33

## 2020-09-10 RX ADMIN — Medication 0.5 MILLIGRAM(S): at 20:45

## 2020-09-10 NOTE — DISCHARGE NOTE PROVIDER - HOSPITAL COURSE
26 y/o F with PMHx of endometriosis, GERD, pre-HOCM, chronic back pain and left leg pain who presents with acute worsening of the pain over the past 6 days to the point where she is unable to ambulate.  She reports that over the past 6 months she has been unable to walk without a cane, but now is unable to walk at all without help.  She rates her pain as a 10/10 in the lower back and left lower extremity. Her last MRI showed lumbar degenerative disc disease L4-5 and L5-S1 with central herniations at both levels and a moderate central and significant lateral recess stenosis. She has experienced sciatica before, but does not think this is that type of pain.  She also reports numbness in the left leg.  She is able to urinate, but requires assistance due to her weakness.  She saw Dr. Gibson this morning who sent her to the ER from the office due to her acute progression of back pain.  She was told she would need surgery due to her disease progression and her lack of improvement with conservative management. Did not take anything for her pain today. Usually takes Oxycodone.     MRI/CT confirmed:    1. Lumbosacral transitional anatomy with sacralization of the L5 vertebral body. Please see discussion in the body of the report for vertebral body numbering.    2. Large central disc herniation at the L4-L5 level with compression of the descending nerve roots and associated severe canal stenosis.    3. Additional smaller disc herniation at the L3-L4 level.    4. Epidural lipomatosis.    Neurosurgery consulted for surgical intervention of severe canal stenosis.     Neurosurgery suggested a L4-L5 Discectomy. Pt demanded a fusion and a second opinion was consulted.     Pt will undergo surgery with Dr. Bray, transferred to [facility] for surgical management under orthopedics team.

## 2020-09-10 NOTE — PROGRESS NOTE ADULT - ASSESSMENT
24 yo F with endometriosis, reflux, pre-HOCM, chronic back pain and left leg pain who presents with acute worsening of the pain over the past 6 days to the point where she is unable to ambulate.  She reports that over the past 6 months she has been unable to walk without a cane, but now is unable to walk at all without help.  She rates her pain as a 10/10 in the lower back and left lower extremity.  Her last MRI showed lumbar degenerative disc disease L4-5 and L5-S1 with central herniations at both levels and a moderate central and significant lateral recess stenosis.  She has experienced sciatica before, but does not think this is that type of pain.  She also reports numbness in the left leg.  She is able to urinate, but requires assistance due to her weakness.  She saw Dr. Gibson this morning who sent her to the ER from the office due to her acute progression of back pain.  She was told she would need surgery due to her disease progression and her lack of improvement with conservative management. Did not take anything for her pain today. Usually takes Oxy     Problem/Plan - 1:  ·  Problem: Acute exacerbation of chronic low back pain.  Plan: pain control  neuro and neurosurgery fu  will monitor   cw steroids  OR today     Problem/Plan - 2:  ·  Problem: GERD (gastroesophageal reflux disease).  Plan: will start protonix.     Pt is going to OR at 10 am but she wants to talk to neurosurgery attending first and wants a second opinion before surgery. Pateint relations called to help her since the surgery in 2 hours. neurosurgery team will come and talk to her.   all questions answered
24 yo F with endometriosis, reflux, hypermobility syndrome. chronic back pain who presents with acute LE weakness, found to have disc herniation with severe canal stenosis    1. Acute on chronic back pain with radiculopathy - MRI with large central L4/5 disc causing severe canal stenosis.  - Neurosurgery assistance appreciated: recommend L4-5 discectomy; however pt requesting second opinion  - ortho consulted  - continue pain management: dexamethasone, morphine PRN, percocet    2. Anxiety -  Psych recs appreciated  - cont diazepam and PRN xanax    DVT ppx: HSQ
24 yo F with endometriosis, reflux, hypermobility syndrome. chronic back pain who presents with acute LE weakness, found to have disc herniation with severe canal stenosis    1. Acute on chronic back pain with radiculopathy - MRI with large central L4/5 disc causing severe canal stenosis.  - Neurosurgery assistance appreciated: recommend L4-5 discectomy; however pt requesting second opinion  - ortho consulted, awaiting final recs from Dr Bray  - continue pain management: dexamethasone, dilaudid PRN, percocet  - PT eval    2. Anxiety -  Psych recs appreciated  - cont diazepam and PRN xanax    DVT ppx: HSQ
26 yo F with endometriosis, reflux, hypermobility syndrome. chronic back pain who presents with acute LE weakness, found to have disc herniation with severe canal stenosis    1. Acute on chronic back pain with radiculopathy - MRI with large central L4/5 disc causing severe canal stenosis.  - Neurosurgery assistance appreciated: recommend L4-5 discectomy; however pt requesting second opinion  - ortho consulted, awaiting eval from Dr Bray  - continue pain management: dexamethasone, morphine PRN, percocet; can switch to morphine to dilaudid equivalent if requested  - PT eval    2. Anxiety -  Psych recs appreciated  - cont diazepam and PRN xanax    DVT ppx: HSQ
26 yo F with endometriosis, reflux, pre-HOCM, chronic back pain and left leg pain who presents with acute worsening of the pain over the past 6 days to the point where she is unable to ambulate.  She reports that over the past 6 months she has been unable to walk without a cane, but now is unable to walk at all without help.     MRI 9/4-   1. Lumbosacral transitional anatomy with sacralization of the L5 vertebral body.   2. Large central disc herniation at the L4-L5 level with compression of the descending nerve roots and associated severe canal stenosis.  3. Additional smaller disc herniation at the L3-L4 level.  4. Epidural lipomatosis.    1. Lumbar herniated disc- awaiting final plan for surgery.    2. Acute pain- Continue pain control.    3. Preoperative clearance- ? h/o pre-HOCM. No echo in system. History not found in outpatient notes.     Echo ordered but does not have to be done befor surgery. Pt is currently medically optimized for surgery.
A/P: 25y Female with chronic back pain and LLE radiculopathy with large L4/5 central disc herniation. She was offered L4/5 discectomy by neurosurgery. Patient is asking for second opinion by Dr. Bray.  Dr. Bray to evaluate patient and make recommendations regarding surgery  Continue with Decadron  Pain control  PT/OT  SCDs
Dx: Lt L4-5 HNP    Plan    analgesics as needed  decadron  Surgery as per Dr Bray to be scheduled       Renée Garza PA-C   Beeper    [id number]
JEFERSON SAM  25F pw acute worsening of low back pain w/ radiculopathy over 6 days which has made it hard for her to ambulate. Pain is 10/10. Pain radiated down the L butt down the leg, and numbness in LLE. No b/b dysfunction.   Imaging: large central L4/5 disc causing severe canal stenosis  - Pain control per primary team  - would recommend Flexion extension XR of Lumbar spine. Patient ammicable to try tomorrow if pain controlled.   - Will have patient see Dr. Gibson on Tuesday if still in house   - No acute neurosurgical intervention at this point. Will continue to follow for any change in neurological exam
JEFERSON SAM  25F pw acute worsening of low back pain w/ radiculopathy over 6 days which has made it hard for her to ambulate. Pain is 10/10. Pain radiated down the L butt down the leg, and numbness in LLE. No b/b dysfunction.   Imaging: large central L4/5 disc causing severe canal stenosis.   - admitted to medicine  - q4h neuro checks  - surgical plan pending d/w dr. christina tuesday if still in house  - cont dex 4q6   - CTL, MR L done  - pain mgmt and anxiety management per primary, can consult psych for anxiety management recs  - Flex ex no clear movement at level of disc herniation
JEFERSON SAM  25F pw acute worsening of low back pain w/ radiculopathy over 6 days which has made it hard for her to ambulate. Pain is 10/10. Pain radiated down the L butt down the leg, and numbness in LLE. No b/b dysfunction.   Imaging: large central L4/5 disc causing severe canal stenosis.   - admitted to medicine  - q4h neuro checks  - surgical plan pending d/w dr. christina tuesday if still in house  - cont dex 4q6   - CTL, MR L done  - pain mgmt and anxiety management per primary, psych recommendations appreciated  - Flex ex no clear movement at level of disc herniation
JEFERSON SAM  25F pw acute worsening of low back pain w/ radiculopathy over 6 days which has made it hard for her to ambulate. Pain is 10/10. Pain radiated down the L butt down the leg, and numbness in LLE. No b/b dysfunction.   Imaging: large central L4/5 disc causing severe canal stenosis.   - admitted to medicine  - q4h neuro checks  - surgical plan pending d/w dr. christina tuesday if still in house  - cont dex 4q6   - CTL, MR MARIA LUISA done  - pain mgmt per primary  - fu flex ex films
26 yo F with endometriosis, reflux, pre-HOCM, chronic back pain and left leg pain who presents with acute worsening of the pain over the past 6 days to the point where she is unable to ambulate.  She reports that over the past 6 months she has been unable to walk without a cane, but now is unable to walk at all without help.     MRI 9/4-   1. Lumbosacral transitional anatomy with sacralization of the L5 vertebral body.   2. Large central disc herniation at the L4-L5 level with compression of the descending nerve roots and associated severe canal stenosis.  3. Additional smaller disc herniation at the L3-L4 level.  4. Epidural lipomatosis.    1. Lumbar herniated disc- awaiting final plan for surgery.    2. Acute pain- Continue pain control.    3. Preoperative clearance- ? h/o pre-HOCM. No echo in system. History not found in outpatient notes.     Echo ordered but does not have to be done befor surgery. Pt is currently medically optimized for surgery.     4. Anxiety- Pt c/o severe anxiety today. Asking for increase in Xanax- done. Seen by psych earlier.

## 2020-09-10 NOTE — PROGRESS NOTE ADULT - REASON FOR ADMISSION
Worsening back pain

## 2020-09-10 NOTE — DISCHARGE NOTE PROVIDER - CARE PROVIDER_API CALL
Mike Bray  ORTHOPAEDIC SURGERY  [address], [address]  [place] [address]  Phone: [phone]  Fax: [phone]  Follow Up Time:

## 2020-09-10 NOTE — PHYSICAL THERAPY INITIAL EVALUATION ADULT - PLANNED THERAPY INTERVENTIONS, PT EVAL
GOAL: Pt will perform 12 stairs with or without U HR as needed within 3-4weeks./gait training/transfer training/balance training/bed mobility training

## 2020-09-10 NOTE — DISCHARGE NOTE PROVIDER - NSDCCPCAREPLAN_GEN_ALL_CORE_FT
PRINCIPAL DISCHARGE DIAGNOSIS  Diagnosis: Central stenosis of spinal canal  Assessment and Plan of Treatment: Continue Decadron as ordered and pain management as ordered.   Follow up with Dr. Bray at [facility] for surgical intervention.      SECONDARY DISCHARGE DIAGNOSES  Diagnosis: Anxiety  Assessment and Plan of Treatment:

## 2020-09-10 NOTE — PHYSICAL THERAPY INITIAL EVALUATION ADULT - PASSIVE RANGE OF MOTION EXAMINATION, REHAB EVAL
Right LE Passive ROM was WNL (within normal limits)/Right UE Passive ROM was WNL (within normal limits)/except lumbar spine/ LLE limited due to pain/Left UE Passive ROM was WNL (within normal limits)/Left LE Passive ROM was WNL (within normal limits)

## 2020-09-10 NOTE — PROGRESS NOTE ADULT - SUBJECTIVE AND OBJECTIVE BOX
[facility] Division of Hospital Medicine  Gail Moore MD  Pager (M-F, 8A-5P): [phone]  Other Times:  [phone]    Patient is a 25y old  Female who presents with a chief complaint of Worsening back pain (08 Sep 2020 05:30)      SUBJECTIVE / OVERNIGHT EVENTS:  No acute events. Awaiting final surgery plans from ortho service. Pt denies new complaints.    ADDITIONAL REVIEW OF SYSTEMS:  negative    MEDICATIONS  (STANDING):  dexAMETHasone  Injectable 4 milliGRAM(s) IV Push every 8 hours  diazepam    Tablet 2 milliGRAM(s) Oral daily  diazepam    Tablet 5 milliGRAM(s) Oral at bedtime  heparin   Injectable 5000 Unit(s) SubCutaneous every 8 hours  influenza   Vaccine 0.5 milliLiter(s) IntraMuscular once  nicotine -   7 mG/24Hr(s) Patch 1 patch Transdermal daily  pantoprazole    Tablet 40 milliGRAM(s) Oral before breakfast    MEDICATIONS  (PRN):  ALPRAZolam 0.5 milliGRAM(s) Oral every 6 hours PRN anxiety  HYDROmorphone  Injectable 0.5 milliGRAM(s) IV Push every 4 hours PRN Moderate Pain (4 - 6)  oxycodone    5 mG/acetaminophen 325 mG 2 Tablet(s) Oral every 6 hours PRN Severe Pain (7 - 10)    PHYSICAL EXAM:  Vital Signs Last 24 Hrs  T(C): 36.7 (09-10-20 @ 09:30), Max: 36.7 (09-09-20 @ 20:10)  T(F): 98 (09-10-20 @ 09:30), Max: 98 (09-09-20 @ 20:10)  HR: 70 (09-10-20 @ 09:30) (65 - 83)  BP: 138/98 (09-10-20 @ 09:30) (127/90 - 146/89)  RR: 17 (09-10-20 @ 09:30) (17 - 17)  SpO2: 96% (09-10-20 @ 09:30) (95% - 97%)  Wt(kg): --    CONSTITUTIONAL: NAD, well-appearing  EYES: PERRLA; conjunctiva and sclera clear  ENMT: Moist oral mucosa, no pharyngeal injection or exudates; normal dentition  NECK: Supple, no palpable masses; no thyromegaly  RESPIRATORY: Normal respiratory effort; lungs are clear to auscultation bilaterally  CARDIOVASCULAR: Regular rate and rhythm, normal S1 and S2, no murmur/rub/gallop; No lower extremity edema; Peripheral pulses are 2+ bilaterally  ABDOMEN: Nontender to palpation, normoactive bowel sounds, no rebound/guarding  MUSCULOSKELETAL: +back pain, decreased ROM, 3/5 strength LLE  PSYCH: A+O to person, place, and time; affect appropriate  NEUROLOGY: no gross sensory deficits   SKIN: No rashes; no palpable lesions    LABS:                        14.5   16.25 )-----------( 440      ( 09 Sep 2020 12:48 )             42.3                                     CAPILLARY BLOOD GLUCOSE

## 2020-09-10 NOTE — PHYSICAL THERAPY INITIAL EVALUATION ADULT - PERTINENT HX OF CURRENT PROBLEM, REHAB EVAL
Pt is a 24 yo F with endometriosis, reflux, pre-HOCM, chronic back pain and left leg pain who presents with acute worsening of the pain over the past 6 days to the point where she is unable to ambulate.  She rates her pain as a 10/10 in the lower back and left lower extremity.  Found to have large central L4/5 disc causing severe canal stenosis.

## 2020-09-10 NOTE — PROGRESS NOTE ADULT - PROVIDER SPECIALTY LIST ADULT
Hospitalist
Neurosurgery
Orthopedics
Orthopedics
Hospitalist
Hospitalist

## 2020-09-10 NOTE — PHYSICAL THERAPY INITIAL EVALUATION ADULT - ADDITIONAL COMMENTS
Prior to admission pt was ambulating w/ cane indpendently, performed ADLs independently. + steps to enter

## 2020-09-10 NOTE — H&P ADULT - HISTORY OF PRESENT ILLNESS
Patient transferred from [facility].  From [facility]:  Patient is a 25y Female who presented w/ chronic low back and pain down L leg for multiple years. Patient states her symptoms have been getting worse over the last 8 weeks.  Denies any new trauma. Patient has seen Dr. Bray as an outpatient in 2019 for the low back pain. MRI revealed large central L4/5 disc herniation. She was evaluated by neurosurgery who recommended a L4/5 discectomy. However, patient is requesting to have a spinal fusion due to her back pain, and is requesting a second opinion by Dr. Bray. Endorses numbness/tingling/paresthesias/ and weakness of LLE. Denies bowel/bladder incontinence. Denies fevers/chills. No other complaints at this time.    At [facility], patient continues to endorse same complaints / concerns.  No new concerns.  Is aware of an in agreement w/ plan for OR on Saturday w/ Dr. Bray.    MEDICATIONS  (STANDING):  acetaminophen   Tablet .. 975 milliGRAM(s) Oral every 8 hours  dexAMETHasone  Injectable 4 milliGRAM(s) IV Push every 8 hours  diazepam    Tablet 5 milliGRAM(s) Oral at bedtime  diazepam    Tablet 5 milliGRAM(s) Oral <User Schedule>  heparin   Injectable 7500 Unit(s) SubCutaneous every 8 hours  nicotine  Polacrilex Gum 4 milliGRAM(s) Oral every 4 hours  pantoprazole    Tablet 40 milliGRAM(s) Oral before breakfast  senna 2 Tablet(s) Oral at bedtime      Allergies    penicillins (Rash)  sulfa drugs (Rash)    Intolerances        PAST MEDICAL & SURGICAL HISTORY:  GERD (gastroesophageal reflux disease)  History of tonsillectomy  Endometriosis  Low Back Pain                          14.5   16.25 )-----------( 440      ( 09 Sep 2020 12:48 )             42.3         Vital Signs Last 24 Hrs  T(C): 36.6 (09-10-20 @ 20:59), Max: 36.8 (09-10-20 @ 14:06)  T(F): 97.9 (09-10-20 @ 20:59), Max: 98.3 (09-10-20 @ 14:06)  HR: 78 (09-10-20 @ 20:59) (70 - 98)  BP: 131/94 (09-10-20 @ 22:03) (125/70 - 147/103)  BP(mean): --  RR: 16 (09-10-20 @ 20:59) (15 - 17)  SpO2: 95% (09-10-20 @ 20:59) (95% - 99%)    Gen: NAD      Spine PE:  Skin intact  No gross deformity  No midnline TTP C/T/L/S spine  No bony step offs  No paraspinal muscle ttp/hypertonicity   Negative Straight leg raise  Negative clonus  Negative babinski  Negative pool  No saddle anesthesia    Motor:                   C5                C6              C7               C8           T1   R            5/5                5/5            5/5             5/5          5/5  L             5/5               5/5             5/5             5/5          5/5                L2             L3             L4               L5            S1  R         5/5           5/5          5/5             5/5           5/5  L          5/5          5/5           3/5             3/5           5/5    Sensory:            C5         C6         C7      C8       T1        (0=absent, 1=impaired, 2=normal, NT=not testable)  R         2            2           2        2         2  L          2            2           2        2         2               L2          L3         L4      L5       S1         (0=absent, 1=impaired, 2=normal, NT=not testable)  R         2            2            2        2        2  L          2            2           2        2         2        Imaging:   MRI L-Spine:    1. Lumbosacral transitional anatomy with sacralization of the L5 vertebral body. Please see discussion in the body of the report for vertebral body numbering.     2. Large central disc herniation at the L4-L5 level with compression of the descending nerve roots and associated severe canal stenosis.     3. Additional smaller disc herniation at the L3-L4 level.     4. Epidural lipomatosis.       A/P: Female with chronic back pain and LLE radiculopathy with large L4/5 central disc herniation.     Pain control  WBAT with assistive devices as needed  Plan for OR on Saturday w/ Dr. Bray   Patient consented  Please document medical clearance prior to procedure

## 2020-09-10 NOTE — PROGRESS NOTE ADULT - SUBJECTIVE AND OBJECTIVE BOX
Patient is a 25y old  Female who presents with a chief complaint of Worsening back pain (09 Sep 2020 11:17)         Patient c/o back and leg pain    T(C): 36.7 (09-09-20 @ 20:10), Max: 36.7 (09-09-20 @ 20:10)  HR: 83 (09-09-20 @ 20:10) (65 - 83)  BP: 133/87 (09-09-20 @ 20:10) (124/85 - 146/89)  RR: 17 (09-09-20 @ 20:10) (17 - 17)  SpO2: 95% (09-09-20 @ 20:10) (95% - 96%)  Wt(kg): --    PHYSICAL EXAM:  NAD, Alert   PE: skin intact  no deformities              [ ] Lower extremities              L2             L3             L4               L5            S1  R         5/5           5/5          5/5             5/5           5/5  L          5/5          5/5           3/5             3/5           5/5    sensation grossly intact       LABS:                        14.5   16.25 )-----------( 440      ( 09 Sep 2020 12:48 )             42.3     09-08    137  |  96  |  20  ----------------------------<  117<H>  4.2   |  24  |  0.53    Ca    9.7      08 Sep 2020 07:06      PT/INR - ( 08 Sep 2020 07:06 )   PT: 10.6 sec;   INR: 0.89 ratio         PTT - ( 08 Sep 2020 07:06 )  PTT:27.8 sec

## 2020-09-10 NOTE — DISCHARGE NOTE NURSING/CASE MANAGEMENT/SOCIAL WORK - PATIENT PORTAL LINK FT
You can access the FollowMyHealth Patient Portal offered by [facility] by registering at the following website: [email or web address]. By joining [facility]’s FollowMyHealth portal, you will also be able to view your health information using other applications (apps) compatible with our system.

## 2020-09-11 ENCOUNTER — TRANSCRIPTION ENCOUNTER (OUTPATIENT)
Age: 25
End: 2020-09-11

## 2020-09-11 DIAGNOSIS — D72.829 ELEVATED WHITE BLOOD CELL COUNT, UNSPECIFIED: ICD-10-CM

## 2020-09-11 DIAGNOSIS — E87.1 HYPO-OSMOLALITY AND HYPONATREMIA: ICD-10-CM

## 2020-09-11 DIAGNOSIS — M54.9 DORSALGIA, UNSPECIFIED: ICD-10-CM

## 2020-09-11 DIAGNOSIS — Z01.818 ENCOUNTER FOR OTHER PREPROCEDURAL EXAMINATION: ICD-10-CM

## 2020-09-11 DIAGNOSIS — Z29.9 ENCOUNTER FOR PROPHYLACTIC MEASURES, UNSPECIFIED: ICD-10-CM

## 2020-09-11 DIAGNOSIS — F41.9 ANXIETY DISORDER, UNSPECIFIED: ICD-10-CM

## 2020-09-11 LAB
ANION GAP SERPL CALC-SCNC: 18 MMO/L — HIGH (ref 7–14)
APTT BLD: 27.6 SEC — SIGNIFICANT CHANGE UP (ref 27–36.3)
BLD GP AB SCN SERPL QL: NEGATIVE — SIGNIFICANT CHANGE UP
BUN SERPL-MCNC: 26 MG/DL — HIGH (ref 7–23)
CALCIUM SERPL-MCNC: 9.2 MG/DL — SIGNIFICANT CHANGE UP (ref 8.4–10.5)
CHLORIDE SERPL-SCNC: 93 MMOL/L — LOW (ref 98–107)
CO2 SERPL-SCNC: 21 MMOL/L — LOW (ref 22–31)
CREAT SERPL-MCNC: 0.59 MG/DL — SIGNIFICANT CHANGE UP (ref 0.5–1.3)
GLUCOSE SERPL-MCNC: 110 MG/DL — HIGH (ref 70–99)
HCG SERPL-ACNC: < 5 MIU/ML — SIGNIFICANT CHANGE UP
HCT VFR BLD CALC: 43.4 % — SIGNIFICANT CHANGE UP (ref 34.5–45)
HGB BLD-MCNC: 15 G/DL — SIGNIFICANT CHANGE UP (ref 11.5–15.5)
INR BLD: 0.94 — SIGNIFICANT CHANGE UP (ref 0.88–1.16)
MCHC RBC-ENTMCNC: 31.4 PG — SIGNIFICANT CHANGE UP (ref 27–34)
MCHC RBC-ENTMCNC: 34.6 % — SIGNIFICANT CHANGE UP (ref 32–36)
MCV RBC AUTO: 90.8 FL — SIGNIFICANT CHANGE UP (ref 80–100)
NRBC # FLD: 0 K/UL — SIGNIFICANT CHANGE UP (ref 0–0)
PLATELET # BLD AUTO: 486 K/UL — HIGH (ref 150–400)
PMV BLD: 10.3 FL — SIGNIFICANT CHANGE UP (ref 7–13)
POTASSIUM SERPL-MCNC: 4.2 MMOL/L — SIGNIFICANT CHANGE UP (ref 3.5–5.3)
POTASSIUM SERPL-SCNC: 4.2 MMOL/L — SIGNIFICANT CHANGE UP (ref 3.5–5.3)
PROTHROM AB SERPL-ACNC: 10.8 SEC — SIGNIFICANT CHANGE UP (ref 10.6–13.6)
RBC # BLD: 4.78 M/UL — SIGNIFICANT CHANGE UP (ref 3.8–5.2)
RBC # FLD: 12.4 % — SIGNIFICANT CHANGE UP (ref 10.3–14.5)
RH IG SCN BLD-IMP: POSITIVE — SIGNIFICANT CHANGE UP
SARS-COV-2 RNA SPEC QL NAA+PROBE: SIGNIFICANT CHANGE UP
SODIUM SERPL-SCNC: 132 MMOL/L — LOW (ref 135–145)
WBC # BLD: 16.03 K/UL — HIGH (ref 3.8–10.5)
WBC # FLD AUTO: 16.03 K/UL — HIGH (ref 3.8–10.5)

## 2020-09-11 PROCEDURE — 90792 PSYCH DIAG EVAL W/MED SRVCS: CPT

## 2020-09-11 PROCEDURE — 99223 1ST HOSP IP/OBS HIGH 75: CPT

## 2020-09-11 RX ORDER — HEPARIN SODIUM 5000 [USP'U]/ML
5000 INJECTION INTRAVENOUS; SUBCUTANEOUS EVERY 12 HOURS
Refills: 0 | Status: COMPLETED | OUTPATIENT
Start: 2020-09-11 | End: 2020-09-12

## 2020-09-11 RX ORDER — GABAPENTIN 400 MG/1
200 CAPSULE ORAL EVERY 8 HOURS
Refills: 0 | Status: DISCONTINUED | OUTPATIENT
Start: 2020-09-11 | End: 2020-09-13

## 2020-09-11 RX ORDER — ACETAMINOPHEN 500 MG
1000 TABLET ORAL ONCE
Refills: 0 | Status: COMPLETED | OUTPATIENT
Start: 2020-09-11 | End: 2020-09-11

## 2020-09-11 RX ORDER — OXYCODONE HYDROCHLORIDE 5 MG/1
10 TABLET ORAL EVERY 12 HOURS
Refills: 0 | Status: DISCONTINUED | OUTPATIENT
Start: 2020-09-11 | End: 2020-09-12

## 2020-09-11 RX ORDER — ACETAMINOPHEN 500 MG
650 TABLET ORAL EVERY 6 HOURS
Refills: 0 | Status: DISCONTINUED | OUTPATIENT
Start: 2020-09-11 | End: 2020-09-12

## 2020-09-11 RX ORDER — DIAZEPAM 5 MG
5 TABLET ORAL EVERY 8 HOURS
Refills: 0 | Status: DISCONTINUED | OUTPATIENT
Start: 2020-09-11 | End: 2020-09-12

## 2020-09-11 RX ORDER — DIPHENHYDRAMINE HCL 50 MG
25 CAPSULE ORAL EVERY 4 HOURS
Refills: 0 | Status: DISCONTINUED | OUTPATIENT
Start: 2020-09-11 | End: 2020-09-14

## 2020-09-11 RX ORDER — HYDROMORPHONE HYDROCHLORIDE 2 MG/ML
0.5 INJECTION INTRAMUSCULAR; INTRAVENOUS; SUBCUTANEOUS EVERY 4 HOURS
Refills: 0 | Status: DISCONTINUED | OUTPATIENT
Start: 2020-09-11 | End: 2020-09-12

## 2020-09-11 RX ORDER — HYDROMORPHONE HYDROCHLORIDE 2 MG/ML
0.5 INJECTION INTRAMUSCULAR; INTRAVENOUS; SUBCUTANEOUS ONCE
Refills: 0 | Status: DISCONTINUED | OUTPATIENT
Start: 2020-09-11 | End: 2020-09-11

## 2020-09-11 RX ADMIN — HYDROMORPHONE HYDROCHLORIDE 0.5 MILLIGRAM(S): 2 INJECTION INTRAMUSCULAR; INTRAVENOUS; SUBCUTANEOUS at 19:30

## 2020-09-11 RX ADMIN — OXYCODONE HYDROCHLORIDE 10 MILLIGRAM(S): 5 TABLET ORAL at 18:44

## 2020-09-11 RX ADMIN — OXYCODONE HYDROCHLORIDE 10 MILLIGRAM(S): 5 TABLET ORAL at 06:11

## 2020-09-11 RX ADMIN — HEPARIN SODIUM 5000 UNIT(S): 5000 INJECTION INTRAVENOUS; SUBCUTANEOUS at 19:22

## 2020-09-11 RX ADMIN — OXYCODONE HYDROCHLORIDE 10 MILLIGRAM(S): 5 TABLET ORAL at 13:00

## 2020-09-11 RX ADMIN — HYDROMORPHONE HYDROCHLORIDE 0.5 MILLIGRAM(S): 2 INJECTION INTRAMUSCULAR; INTRAVENOUS; SUBCUTANEOUS at 11:43

## 2020-09-11 RX ADMIN — Medication 975 MILLIGRAM(S): at 06:11

## 2020-09-11 RX ADMIN — Medication 400 MILLIGRAM(S): at 12:56

## 2020-09-11 RX ADMIN — HYDROMORPHONE HYDROCHLORIDE 0.5 MILLIGRAM(S): 2 INJECTION INTRAMUSCULAR; INTRAVENOUS; SUBCUTANEOUS at 07:30

## 2020-09-11 RX ADMIN — Medication 4 MILLIGRAM(S): at 07:30

## 2020-09-11 RX ADMIN — Medication 25 MILLIGRAM(S): at 10:23

## 2020-09-11 RX ADMIN — OXYCODONE HYDROCHLORIDE 10 MILLIGRAM(S): 5 TABLET ORAL at 07:00

## 2020-09-11 RX ADMIN — HYDROMORPHONE HYDROCHLORIDE 0.5 MILLIGRAM(S): 2 INJECTION INTRAMUSCULAR; INTRAVENOUS; SUBCUTANEOUS at 16:48

## 2020-09-11 RX ADMIN — HYDROMORPHONE HYDROCHLORIDE 0.5 MILLIGRAM(S): 2 INJECTION INTRAMUSCULAR; INTRAVENOUS; SUBCUTANEOUS at 20:00

## 2020-09-11 RX ADMIN — HYDROMORPHONE HYDROCHLORIDE 0.5 MILLIGRAM(S): 2 INJECTION INTRAMUSCULAR; INTRAVENOUS; SUBCUTANEOUS at 07:55

## 2020-09-11 RX ADMIN — Medication 4 MILLIGRAM(S): at 07:31

## 2020-09-11 RX ADMIN — OXYCODONE HYDROCHLORIDE 10 MILLIGRAM(S): 5 TABLET ORAL at 12:59

## 2020-09-11 RX ADMIN — HYDROMORPHONE HYDROCHLORIDE 0.5 MILLIGRAM(S): 2 INJECTION INTRAMUSCULAR; INTRAVENOUS; SUBCUTANEOUS at 15:13

## 2020-09-11 RX ADMIN — Medication 4 MILLIGRAM(S): at 14:06

## 2020-09-11 RX ADMIN — Medication 4 MILLIGRAM(S): at 22:08

## 2020-09-11 RX ADMIN — PANTOPRAZOLE SODIUM 40 MILLIGRAM(S): 20 TABLET, DELAYED RELEASE ORAL at 06:11

## 2020-09-11 RX ADMIN — Medication 5 MILLIGRAM(S): at 21:01

## 2020-09-11 RX ADMIN — OXYCODONE HYDROCHLORIDE 10 MILLIGRAM(S): 5 TABLET ORAL at 17:24

## 2020-09-11 RX ADMIN — Medication 5 MILLIGRAM(S): at 10:23

## 2020-09-11 RX ADMIN — Medication 650 MILLIGRAM(S): at 18:45

## 2020-09-11 RX ADMIN — HYDROMORPHONE HYDROCHLORIDE 0.5 MILLIGRAM(S): 2 INJECTION INTRAMUSCULAR; INTRAVENOUS; SUBCUTANEOUS at 12:43

## 2020-09-11 RX ADMIN — GABAPENTIN 200 MILLIGRAM(S): 400 CAPSULE ORAL at 14:06

## 2020-09-11 NOTE — PROGRESS NOTE ADULT - SUBJECTIVE AND OBJECTIVE BOX
ORTHO PREOP NOTE     Diagnosis: L3-4, L4-5 HNP    Surgeon: Katarina CAMARENA    Procedure: L4-S1 decomp/fusion      Labs:                          15.0   16.03 )-----------( 486      ( 11 Sep 2020 06:39 )             43.4         09-11    132<L>  |  93<L>  |  26<H>  ----------------------------<  110<H>  4.2   |  21<L>  |  0.59    Ca    9.2      11 Sep 2020 06:39          PT/INR - ( 11 Sep 2020 06:39 )   PT: 10.8 SEC;   INR: 0.94          PTT - ( 11 Sep 2020 06:39 )  PTT:27.6 SEC              Type and cross x 2    Medical  Clearance: in chart    Consent: in chart    NPO: after MN

## 2020-09-11 NOTE — BEHAVIORAL HEALTH ASSESSMENT NOTE - RISK ASSESSMENT
Low Acute Suicide Risk Risk factors: marijuana use, chronic back pain    Protective factors: no current SIIP/HIIP, no h/o SA/SIB, no h/o psych admissions, no access to weapons, no psychosis, engaged in work or school, domiciled, social supports, help-seeking behaviors    Overall, pt is a low risk of harm to self/others and does not require psychiatric admission for safety and stabilization.

## 2020-09-11 NOTE — CONSULT NOTE ADULT - ASSESSMENT
25F hx of hypermobility syndrome, L5-S1 disc herniation with chronic low back pain with LLE weakness, endometriosis, pre-HCM (FHx of HCM, echo with borderline LVH, no phenotypic expression of HCM at present), who initially presented to [facility] for worsening back pain, MRI demonstrating large central L4/5 disc causing severe canal stenosis. Transferred to [facility] 9/10 for second opinion with Dr. Bray, planned for L4-S1 laminectomy with possible fusion 9/12.

## 2020-09-11 NOTE — CONSULT NOTE ADULT - SUBJECTIVE AND OBJECTIVE BOX
Requested to consult on this patient for pain management.    HPI: [id number] herniation of intervertebral disc of lumbar spine    As per patient, she is a transfer from [facility].  The patient went to the hospital for debilitating back pain that she has suffered from for many years.  The patient states she has multiple comorbidities including Endometriosis, Stage IV, which she had surgery regarding last year, 8/2019, Fibromyalgia, chronic back pain,  and history of multiple falls.  Patient states she just fractured her coccyx, 2 weeks ago.  Ambulates at home for short distances with a cane.  Patient sees a chronic pain doctor, Dr. Martin and she has been taking Percocet 7.5mg-325mg tablet, 3 times a day at home. As per patient, Dr. Martin was going to increase the dosage to Percocet 10mg-325mg and also prescribed her unknown dosage of Gabapentin, just prior to admission.  Patient has undergone multiple epidural and cortisol shots, and even tried Cymbalta which did not help her at all.  Currently, she is weeping, and crying in bed, complaining of 10/10 lower back pain that is constant, and described as sharp, burning with occasional deep spasms.   Patient states that the Valium helps with the spasms, and she prefers to take the IV Dilaudid PRN with the Oxycodone for breakthrough.  Patient also is emotional regarding her current condition and multiple comorbidities at this time.     Handoff  MEWS Score  GERD (gastroesophageal reflux disease)  Prophylactic measure  Hyponatremia  Leukocytosis  Chronic back pain  Preoperative examination  History of tonsillectomy      acetaminophen   Tablet .. 975 milliGRAM(s) Oral every 8 hours  acetaminophen  IVPB .. 1000 milliGRAM(s) IV Intermittent once  dexAMETHasone  Injectable 4 milliGRAM(s) IV Push every 8 hours  diazepam    Tablet 5 milliGRAM(s) Oral every 8 hours PRN  gabapentin 200 milliGRAM(s) Oral every 8 hours  heparin   Injectable 5000 Unit(s) SubCutaneous every 12 hours  HYDROmorphone  Injectable 0.5 milliGRAM(s) IV Push every 4 hours PRN  magnesium hydroxide Suspension 30 milliLiter(s) Oral daily PRN  nicotine  Polacrilex Gum 4 milliGRAM(s) Oral every 4 hours  oxyCODONE    IR 10 milliGRAM(s) Oral every 4 hours PRN  oxyCODONE    IR 5 milliGRAM(s) Oral every 4 hours PRN  oxyCODONE  ER Tablet 10 milliGRAM(s) Oral every 12 hours  pantoprazole    Tablet 40 milliGRAM(s) Oral before breakfast  senna 2 Tablet(s) Oral at bedtime      penicillins (Rash)  sulfa drugs (Rash)      Admit to Inpatient Level of Care:     Service:  Ortho    Physician:  Katarina (09-10-20 @ 18:30)  Height/Length:     Frequency:  once (09-10-20 @ 18:30)  Weight:     Frequency:  once (09-10-20 @ 18:30)  Vital Signs:     Frequency:  every 8 hours hour(s) hour(s)    Additional Instructions:  Assess BP, HR, RR, Temp. (09-10-20 @ 18:30)  Assess Pain:     Additional Instructions:  MD to specify frequency (09-10-20 @ 18:30)  Notify Provider For:     Additional Instructions:  Systolic blood pressure GREATER THAN 140mmHg or LESS THAN 90mmHg (09-10-20 @ 18:30)  Notify Provider For:     Additional Instructions:  Heart rate GREATER THAN 100 or LESS THAN 50 beats per minute.. (09-10-20 @ 18:30)  Notify Provider For:     Additional Instructions:  Temperature GREATER THAN 38.3C (101.0F) or LESS THAN 36C (97F). (09-10-20 @ 18:30)  Notify Provider For:     Additional Instructions:  Respiratory rate GREATER THAN 20 or LESS THAN 10. (09-10-20 @ 18:30)  Notify Provider For:     Additional Instructions:  Changes in neurological status and /or neurovascular status. (09-10-20 @ 18:30)  Notify Provider For:     Additional Instructions:  Blood glucose LESS THAN 70 milligram/deciliter or GREATER THAN 180 milligrams/deciliter (09-10-20 @ 18:30)  Notify Provider For:     Additional Instructions:  Any signs and symptoms of neurovascular compromise. (09-10-20 @ 18:30)  Elevate - Head of Bed (09-10-20 @ 18:30)  Turn and Position:     Frequency:  every 2 hours (09-10-20 @ 18:30)  Activity - Weight Bearing Status:     Left Lower Extremity:  Weight Bearing As Tolerated    Right Lower Extremity:  Weight Bearing As Tolerated (09-10-20 @ 18:30)  Aspiration Precautions:     Time/Priority:  Routine (09-10-20 @ 18:30)  Fall Risk Protocol:     Time/Priority:  Routine (09-10-20 @ 18:30)  sodium chloride 0.9%.: Solution, 1000 milliLiter(s) infuse at 75 mL/Hr, Stop After 24 Hours (09-10-20 @ 18:30)  acetaminophen   Tablet ..: [Known as TYLENOL..]  975 milliGRAM(s), Oral, every 8 hours  Administration Instructions: MAX DAILY DOSE:  ADULT = 4,000 mG/Day  This is a Look-alike/Sound-alike Medication (09-10-20 @ 18:30)  oxyCODONE    IR:   2.5 milliGRAM(s), Oral, every 4 hours, PRN for Moderate Pain (4 - 6)  Administration Instructions: This is a Look-alike/Sound-alike Medication (09-10-20 @ 18:30)  oxyCODONE    IR:   5 milliGRAM(s), Oral, every 4 hours, PRN for Severe Pain (7 - 10)  Administration Instructions: This is a Look-alike/Sound-alike Medication (09-10-20 @ 18:30)  senna:   2 Tablet(s), Oral, at bedtime (09-10-20 @ 18:30)  magnesium hydroxide Suspension:   30 milliLiter(s), Oral, daily, PRN for Constipation (09-10-20 @ 18:30)  bisacodyl Suppository: [Known as DULCOLAX Suppository]  10 milliGRAM(s), Rectal, daily, PRN for If no bowel movement by POD#2 (09-10-20 @ 18:30)  Basic Metabolic Panel: AM Sched. Collection: 11-Sep-2020 06:00 (09-10-20 @ 18:30)  Comprehensive Metabolic Panel: AM Sched. Collection: 11-Sep-2020 06:00 (09-10-20 @ 18:30)  nicotine  Polacrilex Gum:   4 milliGRAM(s), Oral, every 4 hours (09-10-20 @ 18:30)  dexAMETHasone  Injectable: [Known as DECADRON Injectable]  4 milliGRAM(s), IV Push, every 8 hours  Administration Instructions: Max IV Push dose 6 milliGRAM(s)  IF IV PUSH, ADMINISTER OVER 1 MIN  This is a Look-alike/Sound-alike Medication (09-10-20 @ 18:34)  HYDROmorphone  Injectable: [Known as DILAUDID Injectable]  0.5 milliGRAM(s), IV Push, every 4 hours, PRN for Severe Pain (7 - 10)  Special Instructions: Administer if pt is still experiencing pain 1 hour after pt received oxycodone  Administration Instructions: This is a Look-alike/Sound-alike Medication (09-10-20 @ 18:34)  heparin   Injectable:   5000 Unit(s), SubCutaneous, every 8 hours (09-10-20 @ 18:34)  diazepam    Tablet: [Known as VALIUM]  5 milliGRAM(s), Oral, at bedtime (09-10-20 @ 18:34)  ALPRAZolam: [Known as XANAX]  0.5 milliGRAM(s), Oral, every 6 hours, PRN for anxiety  Administration Instructions: This is a Look-alike/Sound-alike Medication (09-10-20 @ 18:34)  pantoprazole    Tablet: [Known as PROTONIX   DR]  40 milliGRAM(s), Oral, before breakfast  Administration Instructions: swallow whole * don't crush/chew (09-10-20 @ 18:34)  Diet, Regular (09-10-20 @ 18:35)  Chart Check (09-10-20 @ 18:43)  heparin   Injectable:   7500 Unit(s), SubCutaneous, every 8 hours (09-10-20 @ 18:59)  Consult- Social Work, Adult:    Reason for Consult: Positive Depression Screen (09-10-20 @ 19:09)  diazepam    Tablet: [Ordered as VALIUM]  5 milliGRAM(s), Oral, <User Schedule> ( every 1 day: 10:00 )  Special Instructions: Please give at 10AM daily (09-10-20 @ 19:55)  Chart Check (09-10-20 @ 19:55)  oxyCODONE    IR:   10 milliGRAM(s), Oral, every 4 hours, PRN for Severe Pain (7 - 10)  Administration Instructions: This is a Look-alike/Sound-alike Medication (09-10-20 @ 19:57)  oxyCODONE    IR:   5 milliGRAM(s), Oral, every 4 hours, PRN for Moderate Pain (4 - 6)  Administration Instructions: This is a Look-alike/Sound-alike Medication (09-10-20 @ 19:57)  COVID-19  Antibody - for prior infection screening: Routine (09-10-20 @ 22:23)  12 Lead ECG (09-10-20 @ 22:24)  Diet, NPO after Midnight:      NPO Start Date: 11-Sep-2020,   NPO Start Time: 23:59  Except Medications (09-11-20 @ 00:07)  Complete Blood Count: STAT (09-11-20 @ 00:07)  Basic Metabolic Panel: STAT (09-11-20 @ 00:07)  Type + Screen: STAT (09-11-20 @ 00:07)  Blood Typing (ABO + Rho D): STAT  Addl Info: Conditional: ABO Rh Confirmatory Specimen (09-11-20 @ 00:07)  Prothrombin Time and INR, Plasma:  Start Date:11-Sep-2020. STAT (09-11-20 @ 00:07)  Activated Partial Thromboplastin Time:  Start Date:11-Sep-2020. STAT (09-11-20 @ 00:07)  Complete Blood Count: AM Sched. Collection: 11-Sep-2020 06:00 (09-11-20 @ 00:08)  Basic Metabolic Panel: AM Sched. Collection: 11-Sep-2020 06:00  Cancel Reason: Duplicate Order (09-11-20 @ 00:08)  Prothrombin Time and INR, Plasma:  Start Date:11-Sep-2020. AM Sched. Collection:11-Sep-2020 06:00 (09-11-20 @ 00:08)  Activated Partial Thromboplastin Time:  Start Date:11-Sep-2020. AM Sched. Collection:11-Sep-2020 06:00 (09-11-20 @ 00:08)  HCG Quantitative, Serum: STAT (09-11-20 @ 00:08)  Chart Check (09-11-20 @ 09:01)  COVID-19 PCR: STAT  Specimen Source: Nasopharyngeal (09-11-20 @ 09:26)  HCG Quantitative, Serum: 05:00 (09-11-20 @ 09:28)  Complete Blood Count: 05:00 (09-11-20 @ 09:28)  Basic Metabolic Panel: 05:00 (09-11-20 @ 09:28)  Type + Screen: 05:00 (09-11-20 @ 09:28)  Blood Typing (ABO + Rho D): 05:00  Addl Info: Conditional: ABO Rh Confirmatory Specimen (09-11-20 @ 09:28)  heparin   Injectable:   5000 Unit(s), SubCutaneous, every 12 hours, Stop After 2 Doses  Special Instructions: HOLD after MN for OR 9/12/20  Provider's Contact #: [phone] (09-11-20 @ 10:16)  HYDROmorphone  Injectable: [Ordered as DILAUDID Injectable]  0.5 milliGRAM(s), IV Push, once, Stop After 1 Doses  Administration Instructions: This is a Look-alike/Sound-alike Medication (09-11-20 @ 11:32)  acetaminophen  IVPB ..: [Known as OFIRMEV.]  1000 milliGRAM(s) in IV Solution 100 milliLiter(s), IV Intermittent, once, infuse over 15 Minute(s), Stop After 1 Doses  Administration Instructions: MAX DAILY DOSE:  ADULT = 4,000 mG/Day  This is a Look-alike/Sound-alike Medication (09-11-20 @ 11:32)  diazepam    Tablet: [Ordered as VALIUM]  5 milliGRAM(s), Oral, every 8 hours, PRN for spasms (09-11-20 @ 12:16)  oxyCODONE  ER Tablet: [Ordered as OxyCONTIN]  10 milliGRAM(s), Oral, every 12 hours  Administration Instructions: Swallow whole * don't crush/chew  This is a Look-alike/Sound-alike Medication (09-11-20 @ 12:16)  HYDROmorphone  Injectable: [Ordered as DILAUDID Injectable]  0.5 milliGRAM(s), IV Push, every 4 hours, PRN for breakthrough pain  Special Instructions: severe BTP, if pain persists 1 hour after oral meds  Administration Instructions: This is a Look-alike/Sound-alike Medication (09-11-20 @ 12:17)  gabapentin: [Known as NEURONTIN]  200 milliGRAM(s), Oral, every 8 hours (09-11-20 @ 12:18)      PHYSICAL EXAM:  GENERAL: Alert & Oriented x 3 in NAD, well-groomed, well-developed  I stop done and reviewed.        Impression/Plan: Requested by Ortho team to help manage pain.   Discussed pain regimen plan with patient and she agreed.  Team made aware. Patient scheduled for L4-S1 Laminectomy and possible fusion tomorrow 09/12/2020.    Recommendations:  1) Maintain Oxycodone 5 or 10 mg orders every 4 hours PRN.  2) Order IV Dilaudid 0.5mg every 4 hours PRN for severe breakthrough pain.  Not to be given within 1 hour of any other immediate acting opioid.  Must take po Oxycodone first.  3) Change Valium orders to po Valium 5 mg every 8 hours PRN for muscle spasm  4) Order Oxycontin 10 mg q 12 hours.  Hold for oversedation.  5) Order Neurontin 200 mg every 8 hours.  Hold for oversedation.  6) Maintain patient on continuous pulse oximetry for taking opioids with benzodiazepines                Patient was seen 09-11-20 @ 12:30 Requested to consult on this patient for pain management.    HPI: [id number] herniation of intervertebral disc of lumbar spine    As per patient, she is a transfer from [facility].  The patient went to the hospital for debilitating back pain that she has suffered from for many years.  The patient states she has multiple comorbidities including Endometriosis, Stage IV, which she had surgery regarding last year, 8/2019, Fibromyalgia, chronic back pain,  and history of multiple falls.  Patient states she just fractured her coccyx, 2 weeks ago.  Ambulates at home for short distances with a cane.  Patient sees a chronic pain doctor, Dr. Martin and she has been taking Percocet 7.5mg-325mg tablet, 3 times a day at home. As per patient, Dr. Martin was going to increase the dosage to Percocet 10mg-325mg and also prescribed her unknown dosage of Gabapentin, just prior to admission.  Patient has undergone multiple epidural and cortisol shots, and even tried Cymbalta which did not help her at all.  Currently, she is weeping, and crying in bed, complaining of 10/10 lower back pain that is constant, and described as sharp, burning with occasional deep spasms.  Also as per patient, she has intermittent  abdominal pain due to her Endometriosis and cramping now that she is currently menstruating.  Patient states that the Valium helps with the spasms, and she prefers to take the IV Dilaudid PRN with the Oxycodone for breakthrough.  Patient also is emotional regarding her current condition and multiple comorbidities at this time.     Handoff  MEWS Score  GERD (gastroesophageal reflux disease)  Prophylactic measure  Hyponatremia  Leukocytosis  Chronic back pain  Preoperative examination  History of tonsillectomy      acetaminophen   Tablet .. 975 milliGRAM(s) Oral every 8 hours  acetaminophen  IVPB .. 1000 milliGRAM(s) IV Intermittent once  dexAMETHasone  Injectable 4 milliGRAM(s) IV Push every 8 hours  diazepam    Tablet 5 milliGRAM(s) Oral every 8 hours PRN  gabapentin 200 milliGRAM(s) Oral every 8 hours  heparin   Injectable 5000 Unit(s) SubCutaneous every 12 hours  HYDROmorphone  Injectable 0.5 milliGRAM(s) IV Push every 4 hours PRN  magnesium hydroxide Suspension 30 milliLiter(s) Oral daily PRN  nicotine  Polacrilex Gum 4 milliGRAM(s) Oral every 4 hours  oxyCODONE    IR 10 milliGRAM(s) Oral every 4 hours PRN  oxyCODONE    IR 5 milliGRAM(s) Oral every 4 hours PRN  oxyCODONE  ER Tablet 10 milliGRAM(s) Oral every 12 hours  pantoprazole    Tablet 40 milliGRAM(s) Oral before breakfast  senna 2 Tablet(s) Oral at bedtime      penicillins (Rash)  sulfa drugs (Rash)      Admit to Inpatient Level of Care:     Service:  Ortho    Physician:  Katarina (09-10-20 @ 18:30)  Height/Length:     Frequency:  once (09-10-20 @ 18:30)  Weight:     Frequency:  once (09-10-20 @ 18:30)  Vital Signs:     Frequency:  every 8 hours hour(s) hour(s)    Additional Instructions:  Assess BP, HR, RR, Temp. (09-10-20 @ 18:30)  Assess Pain:     Additional Instructions:  MD to specify frequency (09-10-20 @ 18:30)  Notify Provider For:     Additional Instructions:  Systolic blood pressure GREATER THAN 140mmHg or LESS THAN 90mmHg (09-10-20 @ 18:30)  Notify Provider For:     Additional Instructions:  Heart rate GREATER THAN 100 or LESS THAN 50 beats per minute.. (09-10-20 @ 18:30)  Notify Provider For:     Additional Instructions:  Temperature GREATER THAN 38.3C (101.0F) or LESS THAN 36C (97F). (09-10-20 @ 18:30)  Notify Provider For:     Additional Instructions:  Respiratory rate GREATER THAN 20 or LESS THAN 10. (09-10-20 @ 18:30)  Notify Provider For:     Additional Instructions:  Changes in neurological status and /or neurovascular status. (09-10-20 @ 18:30)  Notify Provider For:     Additional Instructions:  Blood glucose LESS THAN 70 milligram/deciliter or GREATER THAN 180 milligrams/deciliter (09-10-20 @ 18:30)  Notify Provider For:     Additional Instructions:  Any signs and symptoms of neurovascular compromise. (09-10-20 @ 18:30)  Elevate - Head of Bed (09-10-20 @ 18:30)  Turn and Position:     Frequency:  every 2 hours (09-10-20 @ 18:30)  Activity - Weight Bearing Status:     Left Lower Extremity:  Weight Bearing As Tolerated    Right Lower Extremity:  Weight Bearing As Tolerated (09-10-20 @ 18:30)  Aspiration Precautions:     Time/Priority:  Routine (09-10-20 @ 18:30)  Fall Risk Protocol:     Time/Priority:  Routine (09-10-20 @ 18:30)  sodium chloride 0.9%.: Solution, 1000 milliLiter(s) infuse at 75 mL/Hr, Stop After 24 Hours (09-10-20 @ 18:30)  acetaminophen   Tablet ..: [Known as TYLENOL..]  975 milliGRAM(s), Oral, every 8 hours  Administration Instructions: MAX DAILY DOSE:  ADULT = 4,000 mG/Day  This is a Look-alike/Sound-alike Medication (09-10-20 @ 18:30)  oxyCODONE    IR:   2.5 milliGRAM(s), Oral, every 4 hours, PRN for Moderate Pain (4 - 6)  Administration Instructions: This is a Look-alike/Sound-alike Medication (09-10-20 @ 18:30)  oxyCODONE    IR:   5 milliGRAM(s), Oral, every 4 hours, PRN for Severe Pain (7 - 10)  Administration Instructions: This is a Look-alike/Sound-alike Medication (09-10-20 @ 18:30)  senna:   2 Tablet(s), Oral, at bedtime (09-10-20 @ 18:30)  magnesium hydroxide Suspension:   30 milliLiter(s), Oral, daily, PRN for Constipation (09-10-20 @ 18:30)  bisacodyl Suppository: [Known as DULCOLAX Suppository]  10 milliGRAM(s), Rectal, daily, PRN for If no bowel movement by POD#2 (09-10-20 @ 18:30)  Basic Metabolic Panel: AM Sched. Collection: 11-Sep-2020 06:00 (09-10-20 @ 18:30)  Comprehensive Metabolic Panel: AM Sched. Collection: 11-Sep-2020 06:00 (09-10-20 @ 18:30)  nicotine  Polacrilex Gum:   4 milliGRAM(s), Oral, every 4 hours (09-10-20 @ 18:30)  dexAMETHasone  Injectable: [Known as DECADRON Injectable]  4 milliGRAM(s), IV Push, every 8 hours  Administration Instructions: Max IV Push dose 6 milliGRAM(s)  IF IV PUSH, ADMINISTER OVER 1 MIN  This is a Look-alike/Sound-alike Medication (09-10-20 @ 18:34)  HYDROmorphone  Injectable: [Known as DILAUDID Injectable]  0.5 milliGRAM(s), IV Push, every 4 hours, PRN for Severe Pain (7 - 10)  Special Instructions: Administer if pt is still experiencing pain 1 hour after pt received oxycodone  Administration Instructions: This is a Look-alike/Sound-alike Medication (09-10-20 @ 18:34)  heparin   Injectable:   5000 Unit(s), SubCutaneous, every 8 hours (09-10-20 @ 18:34)  diazepam    Tablet: [Known as VALIUM]  5 milliGRAM(s), Oral, at bedtime (09-10-20 @ 18:34)  ALPRAZolam: [Known as XANAX]  0.5 milliGRAM(s), Oral, every 6 hours, PRN for anxiety  Administration Instructions: This is a Look-alike/Sound-alike Medication (09-10-20 @ 18:34)  pantoprazole    Tablet: [Known as PROTONIX   DR]  40 milliGRAM(s), Oral, before breakfast  Administration Instructions: swallow whole * don't crush/chew (09-10-20 @ 18:34)  Diet, Regular (09-10-20 @ 18:35)  Chart Check (09-10-20 @ 18:43)  heparin   Injectable:   7500 Unit(s), SubCutaneous, every 8 hours (09-10-20 @ 18:59)  Consult- Social Work, Adult:    Reason for Consult: Positive Depression Screen (09-10-20 @ 19:09)  diazepam    Tablet: [Ordered as VALIUM]  5 milliGRAM(s), Oral, <User Schedule> ( every 1 day: 10:00 )  Special Instructions: Please give at 10AM daily (09-10-20 @ 19:55)  Chart Check (09-10-20 @ 19:55)  oxyCODONE    IR:   10 milliGRAM(s), Oral, every 4 hours, PRN for Severe Pain (7 - 10)  Administration Instructions: This is a Look-alike/Sound-alike Medication (09-10-20 @ 19:57)  oxyCODONE    IR:   5 milliGRAM(s), Oral, every 4 hours, PRN for Moderate Pain (4 - 6)  Administration Instructions: This is a Look-alike/Sound-alike Medication (09-10-20 @ 19:57)  COVID-19  Antibody - for prior infection screening: Routine (09-10-20 @ 22:23)  12 Lead ECG (09-10-20 @ 22:24)  Diet, NPO after Midnight:      NPO Start Date: 11-Sep-2020,   NPO Start Time: 23:59  Except Medications (09-11-20 @ 00:07)  Complete Blood Count: STAT (09-11-20 @ 00:07)  Basic Metabolic Panel: STAT (09-11-20 @ 00:07)  Type + Screen: STAT (09-11-20 @ 00:07)  Blood Typing (ABO + Rho D): STAT  Addl Info: Conditional: ABO Rh Confirmatory Specimen (09-11-20 @ 00:07)  Prothrombin Time and INR, Plasma:  Start Date:11-Sep-2020. STAT (09-11-20 @ 00:07)  Activated Partial Thromboplastin Time:  Start Date:11-Sep-2020. STAT (09-11-20 @ 00:07)  Complete Blood Count: AM Sched. Collection: 11-Sep-2020 06:00 (09-11-20 @ 00:08)  Basic Metabolic Panel: AM Sched. Collection: 11-Sep-2020 06:00  Cancel Reason: Duplicate Order (09-11-20 @ 00:08)  Prothrombin Time and INR, Plasma:  Start Date:11-Sep-2020. AM Sched. Collection:11-Sep-2020 06:00 (09-11-20 @ 00:08)  Activated Partial Thromboplastin Time:  Start Date:11-Sep-2020. AM Sched. Collection:11-Sep-2020 06:00 (09-11-20 @ 00:08)  HCG Quantitative, Serum: STAT (09-11-20 @ 00:08)  Chart Check (09-11-20 @ 09:01)  COVID-19 PCR: STAT  Specimen Source: Nasopharyngeal (09-11-20 @ 09:26)  HCG Quantitative, Serum: 05:00 (09-11-20 @ 09:28)  Complete Blood Count: 05:00 (09-11-20 @ 09:28)  Basic Metabolic Panel: 05:00 (09-11-20 @ 09:28)  Type + Screen: 05:00 (09-11-20 @ 09:28)  Blood Typing (ABO + Rho D): 05:00  Addl Info: Conditional: ABO Rh Confirmatory Specimen (09-11-20 @ 09:28)  heparin   Injectable:   5000 Unit(s), SubCutaneous, every 12 hours, Stop After 2 Doses  Special Instructions: HOLD after MN for OR 9/12/20  Provider's Contact #: [phone] (09-11-20 @ 10:16)  HYDROmorphone  Injectable: [Ordered as DILAUDID Injectable]  0.5 milliGRAM(s), IV Push, once, Stop After 1 Doses  Administration Instructions: This is a Look-alike/Sound-alike Medication (09-11-20 @ 11:32)  acetaminophen  IVPB ..: [Known as OFIRMEV.]  1000 milliGRAM(s) in IV Solution 100 milliLiter(s), IV Intermittent, once, infuse over 15 Minute(s), Stop After 1 Doses  Administration Instructions: MAX DAILY DOSE:  ADULT = 4,000 mG/Day  This is a Look-alike/Sound-alike Medication (09-11-20 @ 11:32)  diazepam    Tablet: [Ordered as VALIUM]  5 milliGRAM(s), Oral, every 8 hours, PRN for spasms (09-11-20 @ 12:16)  oxyCODONE  ER Tablet: [Ordered as OxyCONTIN]  10 milliGRAM(s), Oral, every 12 hours  Administration Instructions: Swallow whole * don't crush/chew  This is a Look-alike/Sound-alike Medication (09-11-20 @ 12:16)  HYDROmorphone  Injectable: [Ordered as DILAUDID Injectable]  0.5 milliGRAM(s), IV Push, every 4 hours, PRN for breakthrough pain  Special Instructions: severe BTP, if pain persists 1 hour after oral meds  Administration Instructions: This is a Look-alike/Sound-alike Medication (09-11-20 @ 12:17)  gabapentin: [Known as NEURONTIN]  200 milliGRAM(s), Oral, every 8 hours (09-11-20 @ 12:18)      PHYSICAL EXAM:  GENERAL: Alert & Oriented x 3 in NAD, well-groomed, well-developed  I stop done and reviewed.        Impression/Plan: Requested by Ortho team to help manage pain.   Discussed pain regimen plan with patient and she agreed.  Team made aware. Patient scheduled for L4-S1 Laminectomy and possible fusion tomorrow 09/12/2020.    Recommendations:  1) Maintain Oxycodone 5 or 10 mg orders every 4 hours PRN.  2) Order IV Dilaudid 0.5mg every 4 hours PRN for severe breakthrough pain.  Not to be given within 1 hour of any other immediate acting opioid.  Must take po Oxycodone first.  3) Change Valium orders to po Valium 5 mg every 8 hours PRN for muscle spasm  4) Order Oxycontin 10 mg q 12 hours.  Hold for oversedation.  5) Order Neurontin 200 mg every 8 hours.  Hold for oversedation.  6) Maintain patient on continuous pulse oximetry for taking opioids with benzodiazepines  7) Recommend Psych consult for Anxiety.                Patient was seen 09-11-20 @ 12:30

## 2020-09-11 NOTE — BEHAVIORAL HEALTH ASSESSMENT NOTE - ORIENTED TO PERSON
"Called and spoke with parent in regards to complaints of \"fever/poor feeding\". Parent reports fever this morning (101/102 f), denies cough, chest pain, crying, congestion, SOB, breathing fast or vomiting. Parent denies- any sign of febrile seizure, Partent reports patient apears to be \"notplayful\"/active as used\", has normal wet diapers.    Same day provider telephone consult scheduled, Advised parent to monitor fever and breathing patterns, to apply  wet towel to relief fever and to call the clinic if symptoms get worse or develop fever greater than  104, f. Parent verbalized understanding and agreed     message routed to PCP/Triage assisting provider for additional advisement if appropriate.    Will continue to follow up.    Bing Mario RN    "
"Return call with concern for fever (temporal) - at 103F 2 Hours ago  Tylenol 5 mins ago- now temp 101F  Day 3 of fever now (low 100s for the past 2 days, mom initially thought that she was just teething)    Not feeling as playful, Not eating (normally eats \"nonstop\"), Drinking bottle though  No drooling, Making tears  Normal urination, Normal BMs  No trouble breathing per mother,No respiratory symptoms  No ear pulling    High fever in 12 month old- needs evaluation in person/lab work in the ED. [facility] ED or go to [facility] ED evaluation. I will call [facility] and inform of patient's arrival.     MD [facility] PGY-2  "
[facility] phone call message-patient reporting a symptom:     Symptom: Fever/Not eating     When did symptoms begin? This morning     Characteristics: (location on body, intensity, what makes it better or worse, associated symptoms):      Additional Details:     Same Day Visit Offered: Yes, declined    Additional comments:     OK to leave message on voice mail? Yes    Advised patient that RN would call back within 3 hours, unless emergent   Primary language: English      needed? No    Call taken on May 14, 2020 at 10:39 AM by Karly Matta          
Yes

## 2020-09-11 NOTE — BEHAVIORAL HEALTH ASSESSMENT NOTE - NSBHCONSULTRECOMMENDOTHER_PSY_A_CORE FT
At this time patient declining beginning an SSRI or other medication to take daily, but states she is interested in a referral for an outpatient psychiatrist. As already being prescribed Valium for back spasms PRN while in the hospital, would not recommend another PRN benzodiazepine at this time. At this time patient declining beginning an SSRI or other medication to take daily, but states she is interested in a referral for an outpatient psychiatrist. As already being prescribed Valium for back spasms PRN while in the hospital, would not recommend another PRN benzodiazepine at this time.    Consider consulting Holistic RN

## 2020-09-11 NOTE — BEHAVIORAL HEALTH ASSESSMENT NOTE - NSBHCHARTREVIEWVS_PSY_A_CORE FT
Vital Signs Last 24 Hrs  T(C): 36.5 (11 Sep 2020 10:41), Max: 36.8 (10 Sep 2020 14:06)  T(F): 97.7 (11 Sep 2020 10:41), Max: 98.3 (10 Sep 2020 14:06)  HR: 79 (11 Sep 2020 11:37) (70 - 98)  BP: 117/83 (11 Sep 2020 11:37) (117/83 - 147/103)  BP(mean): --  RR: 19 (11 Sep 2020 11:37) (15 - 19)  SpO2: 95% (11 Sep 2020 11:37) (95% - 99%)

## 2020-09-11 NOTE — CONSULT NOTE ADULT - PROBLEM SELECTOR RECOMMENDATION 2
-hx of hypermobility syndrome with chronic back pain -hx of hypermobility syndrome with chronic back pain, L5-S1 disc herniation  -now with acute on chronic back pain, MRI demonstrating large central L4/5 disc causing severe canal stenosis  -planned for L4-S1 laminectomy with possible fusion 9/12.   -management per ortho, on decadron  -PT eval after OR

## 2020-09-11 NOTE — BEHAVIORAL HEALTH ASSESSMENT NOTE - SUMMARY
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    Patient endorsing some anxiety chronically which has worsened with her worsening back pain. Patient is future-oriented, hopeful that her back pain will improve with surgery. Patient denies SI, denies depressive symptoms, denies psychotic symptoms. Patient is cooperative, with good insight and judgment.     At this time patient declining beginning an SSRI or other medication to take daily, but states she is interested in a referral for an outpatient psychiatrist. As already being prescribed Valium for back spasms PRN while in the hospital, would not recommend another PRN benzodiazepine at this time.

## 2020-09-11 NOTE — PROGRESS NOTE ADULT - SUBJECTIVE AND OBJECTIVE BOX
Orthopaedic Surgery Progress Note    Subjective:   Patient seen and examined  No acute events overnight  Pain is tolerable    Objective:  T(C): 36.5 (09-11-20 @ 06:05), Max: 36.8 (09-10-20 @ 14:06)  HR: 78 (09-11-20 @ 06:05) (70 - 98)  BP: 124/84 (09-11-20 @ 06:05) (124/84 - 147/103)  RR: 16 (09-11-20 @ 06:05) (15 - 17)  SpO2: 95% (09-11-20 @ 06:05) (95% - 99%)  Wt(kg): --    09-10 @ 07:01  -  09-11 @ 06:38  --------------------------------------------------------  IN: 0 mL / OUT: 250 mL / NET: -250 mL    PE    NAD  Back:   dressing C/D/I, mild appropriate tonya-incisional ttp  HMV in place w/ serosanguinous output  Neuro:  RLE IP 5/5 HS 5/5 Q 5/5 GS 5/5 TA 5/5 EHL 5/5   SILT L2-S1  LLE IP 5/5 HS 5/5 Q 5/5 GS 5/5 TA 3/5 EHL 3/5  SILT L2-S1  WWP BLE                          14.5   16.25 )-----------( 440      ( 09 Sep 2020 12:48 )             42.3       25y Female to undergo L4-S1 laminectomy with possible fusion  - OR Saturday  - NPO after midnight  - FU medical clearance/booking/consent  - Pain control  - FU labs  - WBAT  - PT/OT/OOB  - I/S  - SCDs  - Dispo planning

## 2020-09-11 NOTE — BEHAVIORAL HEALTH ASSESSMENT NOTE - NSBHCHARTREVIEWLAB_PSY_A_CORE FT
15.0   16.03 )-----------( 486      ( 11 Sep 2020 06:39 )             43.4     09-11    132<L>  |  93<L>  |  26<H>  ----------------------------<  110<H>  4.2   |  21<L>  |  0.59    Ca    9.2      11 Sep 2020 06:39

## 2020-09-11 NOTE — BEHAVIORAL HEALTH ASSESSMENT NOTE - NSBHCHARTREVIEWINVESTIGATE_PSY_A_CORE FT
< from: 12 Lead ECG (09.06.20 @ 16:58) >      Ventricular Rate 77 BPM    Atrial Rate 77 BPM    P-R Interval 156 ms    QRS Duration 92 ms    Q-T Interval 380 ms    QTC Calculation(Bezet) 430 ms    P Axis 40 degrees    R Axis 30 degrees    T Axis 30 degrees    Diagnosis Line NORMAL SINUS RHYTHM  NORMAL ECG  NO PREVIOUS ECGS AVAILABLE  Confirmed by DWIGHT PATEL MD ([id number]) on 9/7/2020 1:13:10 PM    < end of copied text >

## 2020-09-11 NOTE — CONSULT NOTE ADULT - PROBLEM SELECTOR RECOMMENDATION 9
-denies chest pain, shortness of breath, palpitations at present  -hx of pre HCM, has family hx of HCM in her maternal/paternal aunts, follows with cardiology Dr. Art Cabrales monitoring, yearly surveillance echos. d/w Dr. Cabrales - patients' echo with borderline LVH, no overt phenotype of HCM at present, had cardionet monitoring for palpitations in the past which was unremarkable, not recommending inpatient cards evaluation prior to surgery   -reviewed EKG (9/10 sinus rhythm), TTE 9/9/20 LV with normal thickness  -RCRI with 0.4% risk of MACE   -patient optimized for intermediate risk procedure, no further cardiovascular testing needed prior to OR -denies chest pain, shortness of breath, palpitations at present  -hx of pre HCM, has family hx of HCM in her maternal/paternal aunts, follows with cardiology Dr. Art Cabrales for monitoring, yearly surveillance echos. d/w Dr. Cabrales - patients' echo with borderline LVH, no overt phenotype of HCM at present, had cardionet monitoring for palpitations in the past which was unremarkable, not recommending inpatient cards evaluation prior to surgery   -reviewed EKG (9/10 sinus rhythm), TTE 9/9/20 LV with normal thickness  -RCRI with 0.4% risk of MACE   -patient optimized for intermediate risk procedure  -no further cardiovascular testing needed prior to OR -denies chest pain, shortness of breath, palpitations at present  -hx of pre HCM, has family hx of HCM in her maternal/paternal aunts, follows with cardiology Dr. Art Cabrales for monitoring, yearly surveillance echos. d/w Dr. Cabrales - patients' echo with borderline LVH, no overt phenotype of HCM at present, had cardionet monitoring in the past which was unremarkable, not recommending inpatient cards evaluation prior to OR  -reviewed EKG (9/10 sinus rhythm), TTE 9/9/20 LV with normal thickness  -RCRI with 0.4% risk of MACE   -patient optimized for intermediate risk procedure  -no further cardiovascular testing needed prior to OR

## 2020-09-11 NOTE — BEHAVIORAL HEALTH ASSESSMENT NOTE - CASE SUMMARY
25 year old F, PPH cannabis abuse,  PMH of endometriosis, chronic lower back pain and leg pain, admitted medically for laminectomy and possible fusion, psychiatry consulted for emotional support. Patient denies psychiatric symptoms other than mild appropriate anxiety, OK to use valium PRN as ordered. OK to use ativan 0.5mg IV PRN post anesthesia if necessary as patient has history of post anesthesia agitation/confusion. Holistic RN. F/u with psych as above at her leisure. No psych c/i to surgery or discharge.

## 2020-09-11 NOTE — BEHAVIORAL HEALTH ASSESSMENT NOTE - NSBHCONSULTFOLLOWAFTERCARE_PSY_A_CORE FT
Please provide patient with phone number for adult outpatient clinic at [facility] for routine outpatient follow up: [phone]

## 2020-09-11 NOTE — CONSULT NOTE ADULT - SUBJECTIVE AND OBJECTIVE BOX
[facility] Division of Hospital Medicine  Teressa Bui MD  Pager (M-F, 8A-5P): [phone]  Other Times:  [phone]    CHIEF COMPLAINT: Patient is a 25y old  Female who presents with a chief complaint of     HPI: Patient transferred from [facility].  From [facility]:    25F hx of hypermobility syndrome, L5-S1 disc herniation with chronic low back pain with LLE weakness, endometriosis, pre-HCM (FHx of HCM, echo with borderline LVH, no phenotypic expression of HCM at present), who initially presented to [facility] for worsening back pain. Denies any new trauma. Patient has seen Dr. Bray as an outpatient in 2019 for the low back pain. MRI revealed large central L4/5 disc herniation. She was evaluated by neurosurgery who recommended a L4/5 discectomy. However, patient is requesting to have a spinal fusion due to her back pain, and is requesting a second opinion by Dr. Bray, transferred from [facility] to [facility] 9/10. Endorses numbness/tingling/paresthesias/ and weakness of LLE. Denies bowel/bladder incontinence. Denies fevers/chills. No other complaints at this time.    Able to ambulate with cane at baseline. Has had surgery in the past, has a tough time coming out of anesthesia. No personal/family hx of bleeding/clotting disorders. Follows with cardiology due to family history of HCM, however her echos have been stable and she has no phenotypic expression of HCM. Had palpitations in the past, cardionet monitoring was unremarkable. Denies chest pain or shortness of breath.     MEDICATIONS  (STANDING):  acetaminophen   Tablet .. 975 milliGRAM(s) Oral every 8 hours  dexAMETHasone  Injectable 4 milliGRAM(s) IV Push every 8 hours  diazepam    Tablet 5 milliGRAM(s) Oral at bedtime  diazepam    Tablet 5 milliGRAM(s) Oral <User Schedule>  heparin   Injectable 7500 Unit(s) SubCutaneous every 8 hours  nicotine  Polacrilex Gum 4 milliGRAM(s) Oral every 4 hours  pantoprazole    Tablet 40 milliGRAM(s) Oral before breakfast  senna 2 Tablet(s) Oral at bedtime      Allergies    penicillins (Rash)  sulfa drugs (Rash)    Intolerances        PAST MEDICAL & SURGICAL HISTORY:  GERD (gastroesophageal reflux disease)  History of tonsillectomy  Endometriosis  Low Back Pain                          14.5   16.25 )-----------( 440      ( 09 Sep 2020 12:48 )             42.3         Vital Signs Last 24 Hrs  T(C): 36.6 (09-10-20 @ 20:59), Max: 36.8 (09-10-20 @ 14:06)  T(F): 97.9 (09-10-20 @ 20:59), Max: 98.3 (09-10-20 @ 14:06)  HR: 78 (09-10-20 @ 20:59) (70 - 98)  BP: 131/94 (09-10-20 @ 22:03) (125/70 - 147/103)  RR: 16 (09-10-20 @ 20:59) (15 - 17)  SpO2: 95% (09-10-20 @ 20:59) (95% - 99%)    Gen: NAD      Spine PE:  Skin intact  No gross deformity  No midnline TTP C/T/L/S spine  No bony step offs  No paraspinal muscle ttp/hypertonicity   Negative Straight leg raise  Negative clonus  Negative babinski  Negative pool  No saddle anesthesia    Motor:                   C5                C6              C7               C8           T1   R            5/5                5/5            5/5             5/5          5/5  L             5/5               5/5             5/5             5/5          5/5                L2             L3             L4               L5            S1  R         5/5           5/5          5/5             5/5           5/5  L          5/5          5/5           3/5             3/5           5/5    Sensory:            C5         C6         C7      C8       T1        (0=absent, 1=impaired, 2=normal, NT=not testable)  R         2            2           2        2         2  L          2            2           2        2         2               L2          L3         L4      L5       S1         (0=absent, 1=impaired, 2=normal, NT=not testable)  R         2            2            2        2        2  L          2            2           2        2         2        Imaging:   MRI L-Spine:    1. Lumbosacral transitional anatomy with sacralization of the L5 vertebral body. Please see discussion in the body of the report for vertebral body numbering.     2. Large central disc herniation at the L4-L5 level with compression of the descending nerve roots and associated severe canal stenosis.     3. Additional smaller disc herniation at the L3-L4 level.     4. Epidural lipomatosis.       A/P: Female with chronic back pain and LLE radiculopathy with large L4/5 central disc herniation.     Pain control  WBAT with assistive devices as needed  Plan for OR on Saturday w/ Dr. Bray   Patient consented  Please document medical clearance prior to procedure (10 Sep 2020 22:17)      History limited due to: [ ] Dementia  [ ] Delirium  [ ] Condition    Pain Symptoms if applicable:             	                         none	   mild         moderate         severe  	                            0	    1-3	     4-6	         7-10  Pain:  Location:	  Modifying factors:	  Associated symptoms:	    Allergies    penicillins (Rash)  sulfa drugs (Rash)    Intolerances        HOME MEDICATIONS: [x] Reviewed    MEDICATIONS  (STANDING):  acetaminophen   Tablet .. 975 milliGRAM(s) Oral every 8 hours  acetaminophen  IVPB .. 1000 milliGRAM(s) IV Intermittent once  dexAMETHasone  Injectable 4 milliGRAM(s) IV Push every 8 hours  diazepam    Tablet 5 milliGRAM(s) Oral at bedtime  diazepam    Tablet 5 milliGRAM(s) Oral <User Schedule>  heparin   Injectable 5000 Unit(s) SubCutaneous every 12 hours  HYDROmorphone  Injectable 0.5 milliGRAM(s) IV Push once  nicotine  Polacrilex Gum 4 milliGRAM(s) Oral every 4 hours  pantoprazole    Tablet 40 milliGRAM(s) Oral before breakfast  senna 2 Tablet(s) Oral at bedtime    MEDICATIONS  (PRN):  ALPRAZolam 0.5 milliGRAM(s) Oral every 6 hours PRN anxiety  HYDROmorphone  Injectable 0.5 milliGRAM(s) IV Push every 4 hours PRN Severe Pain (7 - 10)  magnesium hydroxide Suspension 30 milliLiter(s) Oral daily PRN Constipation  oxyCODONE    IR 10 milliGRAM(s) Oral every 4 hours PRN Severe Pain (7 - 10)  oxyCODONE    IR 5 milliGRAM(s) Oral every 4 hours PRN Moderate Pain (4 - 6)      PAST MEDICAL & SURGICAL HISTORY:  GERD (gastroesophageal reflux disease)  History of tonsillectomy  [x ] Reviewed     SOCIAL HISTORY:  [x] Substance abuse:   [x] Alcohol use:   [x] Tobacco:     FAMILY HISTORY:  [x] No pertinent family history in first degree relatives     REVIEW OF SYSTEMS:  [x] All other ROS negative  [  ] Unable to obtain due to poor mental status    Vital Signs Last 24 Hrs  T(C): 36.5 (11 Sep 2020 10:41), Max: 36.8 (10 Sep 2020 14:06)  T(F): 97.7 (11 Sep 2020 10:41), Max: 98.3 (10 Sep 2020 14:06)  HR: 79 (11 Sep 2020 11:37) (70 - 98)  BP: 117/83 (11 Sep 2020 11:37) (117/83 - 147/103)  \\  RR: 19 (11 Sep 2020 11:37) (15 - 19)  SpO2: 95% (11 Sep 2020 11:37) (95% - 99%)    PHYSICAL EXAM:  GENERAL: NAD, well-groomed, well-developed  HEAD:  Atraumatic, Normocephalic  EYES: EOMI, PERRLA, conjunctiva and sclera clear  ENMT: Moist mucous membranes  NECK: Supple, No JVD  RESPIRATORY: Clear to auscultation bilaterally; No rales, rhonchi, wheezing, or rubs  CARDIOVASCULAR: Regular rate and rhythm; No murmurs, rubs, or gallops  GASTROINTESTINAL: Soft, Nontender, Nondistended; Bowel sounds present  GENITOURINARY: Not examined  EXTREMITIES:  2+ Peripheral Pulses, No clubbing, cyanosis, or edema  NERVOUS SYSTEM:  Alert & Oriented X3; Moving all 4 extremities; LLE weaker than RLE (chronic)  HEME/LYMPH: No lymphadenopathy noted  SKIN: No rashes or lesions    LABS:                        15.0   16.03 )-----------( 486      ( 11 Sep 2020 06:39 )             43.4     Hemoglobin: 15.0 g/dL (09-11 @ 06:39)  Hemoglobin: 14.5 g/dL (09-09 @ 12:48)  Hemoglobin: 14.4 g/dL (09-08 @ 07:06)    09-11    132<L>  |  93<L>  |  26<H>  ----------------------------<  110<H>  4.2   |  21<L>  |  0.59    Ca    9.2      11 Sep 2020 06:39      PT/INR - ( 11 Sep 2020 06:39 )   PT: 10.8 SEC;   INR: 0.94          PTT - ( 11 Sep 2020 06:39 )  PTT:27.6 SEC    Microbiology     RADIOLOGY & ADDITIONAL STUDIES:    EKG:   Personally Reviewed:  [x] YES     Imaging:   Personally Reviewed:  [x] YES               [ ] Consultant(s) Notes Reviewed  [x] Care Discussed with Consultants/Other Providers: Ortho PA - discussed

## 2020-09-11 NOTE — BEHAVIORAL HEALTH ASSESSMENT NOTE - SUICIDE PROTECTIVE FACTORS
Identifies reasons for living/Engaged in work or school/Responsibility to family and others/Has future plans/Supportive social network of family or friends/Fear of death or the actual act of killing self/Ability to cope with stress

## 2020-09-11 NOTE — BEHAVIORAL HEALTH ASSESSMENT NOTE - HPI (INCLUDE ILLNESS QUALITY, SEVERITY, DURATION, TIMING, CONTEXT, MODIFYING FACTORS, ASSOCIATED SIGNS AND SYMPTOMS)
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    On interview patient states she has had much anxiety over the years, which has worsened for the past few months as her back pain has gotten more severe. Patient states that she once saw an outpatient therapist for help with her anxiety many years ago and found it to be somewhat therapeutic, stating that in general she avoids medications. Patient states that her anxiety has not gotten in the way with her ability to work or to perform other daily activities. 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 Patient denies any depressive symptoms or SI, stating that she loves life and hopes that her back pain will improve with the surgery. Patient denies any manic or psychotic symptoms. Patient states that she would be interested in seeing a psychiatrist as an outpatient and would rather avoid any medications at this time.

## 2020-09-11 NOTE — BEHAVIORAL HEALTH ASSESSMENT NOTE - NSBHCONSULTMEDS_PSY_A_CORE FT
- c/w Valium PRN for anxiety as ordered  - if agitated post anesthesia (pt has history of this), can use ativan 0.5mg IV PRN

## 2020-09-11 NOTE — BEHAVIORAL HEALTH ASSESSMENT NOTE - NSBHCHARTREVIEWIMAGING_PSY_A_CORE FT
< from: Xray Lumbosacral Spine with Bend 6 Views (09.06.20 @ 18:32) >      EXAM:  SPINE LUMBOSAC MIN 6 V W BEND                            PROCEDURE DATE:  09/06/2020            INTERPRETATION:  XR LUMBAR SPINE BENDING 6 VIEWS    INDICATION: STANDING WITH NEUTRAL+FLEX+EX.    COMPARISON: 9/4/2020 anatomically    IMPRESSION:    Straightening of the spine with loss of curvature. Lumbosacral transitional anatomy with sacralization of the L5 vertebral body. Mild dextroscoliosis. The disc spaces and vertebral body heights are maintained.                  ALYX BOWERS M.D.,ATTENDING RADIOLOGIST  This document has been electronically signed. Sep  7 2020 11:54AM                < end of copied text >

## 2020-09-12 ENCOUNTER — RESULT REVIEW (OUTPATIENT)
Age: 25
End: 2020-09-12

## 2020-09-12 LAB
ANION GAP SERPL CALC-SCNC: 15 MMO/L — HIGH (ref 7–14)
ANION GAP SERPL CALC-SCNC: 17 MMO/L — HIGH (ref 7–14)
BASOPHILS # BLD AUTO: 0.11 K/UL — SIGNIFICANT CHANGE UP (ref 0–0.2)
BASOPHILS # BLD AUTO: 0.13 K/UL — SIGNIFICANT CHANGE UP (ref 0–0.2)
BASOPHILS NFR BLD AUTO: 0.4 % — SIGNIFICANT CHANGE UP (ref 0–2)
BASOPHILS NFR BLD AUTO: 0.7 % — SIGNIFICANT CHANGE UP (ref 0–2)
BASOPHILS NFR SPEC: 0 % — SIGNIFICANT CHANGE UP (ref 0–2)
BLASTS # FLD: 0 % — SIGNIFICANT CHANGE UP (ref 0–0)
BLD GP AB SCN SERPL QL: NEGATIVE — SIGNIFICANT CHANGE UP
BUN SERPL-MCNC: 18 MG/DL — SIGNIFICANT CHANGE UP (ref 7–23)
BUN SERPL-MCNC: 21 MG/DL — SIGNIFICANT CHANGE UP (ref 7–23)
CALCIUM SERPL-MCNC: 8.6 MG/DL — SIGNIFICANT CHANGE UP (ref 8.4–10.5)
CALCIUM SERPL-MCNC: 9.6 MG/DL — SIGNIFICANT CHANGE UP (ref 8.4–10.5)
CHLORIDE SERPL-SCNC: 94 MMOL/L — LOW (ref 98–107)
CHLORIDE SERPL-SCNC: 95 MMOL/L — LOW (ref 98–107)
CO2 SERPL-SCNC: 21 MMOL/L — LOW (ref 22–31)
CO2 SERPL-SCNC: 24 MMOL/L — SIGNIFICANT CHANGE UP (ref 22–31)
CREAT SERPL-MCNC: 0.49 MG/DL — LOW (ref 0.5–1.3)
CREAT SERPL-MCNC: 0.56 MG/DL — SIGNIFICANT CHANGE UP (ref 0.5–1.3)
EOSINOPHIL # BLD AUTO: 0.01 K/UL — SIGNIFICANT CHANGE UP (ref 0–0.5)
EOSINOPHIL # BLD AUTO: 0.01 K/UL — SIGNIFICANT CHANGE UP (ref 0–0.5)
EOSINOPHIL NFR BLD AUTO: 0 % — SIGNIFICANT CHANGE UP (ref 0–6)
EOSINOPHIL NFR BLD AUTO: 0.1 % — SIGNIFICANT CHANGE UP (ref 0–6)
EOSINOPHIL NFR FLD: 0 % — SIGNIFICANT CHANGE UP (ref 0–6)
GIANT PLATELETS BLD QL SMEAR: PRESENT — SIGNIFICANT CHANGE UP
GLUCOSE SERPL-MCNC: 104 MG/DL — HIGH (ref 70–99)
GLUCOSE SERPL-MCNC: 107 MG/DL — HIGH (ref 70–99)
HCG SERPL-ACNC: < 5 MIU/ML — SIGNIFICANT CHANGE UP
HCT VFR BLD CALC: 40.3 % — SIGNIFICANT CHANGE UP (ref 34.5–45)
HCT VFR BLD CALC: 47.2 % — HIGH (ref 34.5–45)
HGB BLD-MCNC: 13.4 G/DL — SIGNIFICANT CHANGE UP (ref 11.5–15.5)
HGB BLD-MCNC: 15.4 G/DL — SIGNIFICANT CHANGE UP (ref 11.5–15.5)
IMM GRANULOCYTES NFR BLD AUTO: 6.1 % — HIGH (ref 0–1.5)
IMM GRANULOCYTES NFR BLD AUTO: 6.4 % — HIGH (ref 0–1.5)
LYMPHOCYTES # BLD AUTO: 1.13 K/UL — SIGNIFICANT CHANGE UP (ref 1–3.3)
LYMPHOCYTES # BLD AUTO: 1.59 K/UL — SIGNIFICANT CHANGE UP (ref 1–3.3)
LYMPHOCYTES # BLD AUTO: 4.3 % — LOW (ref 13–44)
LYMPHOCYTES # BLD AUTO: 8.1 % — LOW (ref 13–44)
LYMPHOCYTES NFR SPEC AUTO: 3.8 % — LOW (ref 13–44)
MACROCYTES BLD QL: SIGNIFICANT CHANGE UP
MCHC RBC-ENTMCNC: 30.2 PG — SIGNIFICANT CHANGE UP (ref 27–34)
MCHC RBC-ENTMCNC: 30.3 PG — SIGNIFICANT CHANGE UP (ref 27–34)
MCHC RBC-ENTMCNC: 32.6 % — SIGNIFICANT CHANGE UP (ref 32–36)
MCHC RBC-ENTMCNC: 33.3 % — SIGNIFICANT CHANGE UP (ref 32–36)
MCV RBC AUTO: 90.8 FL — SIGNIFICANT CHANGE UP (ref 80–100)
MCV RBC AUTO: 92.9 FL — SIGNIFICANT CHANGE UP (ref 80–100)
METAMYELOCYTES # FLD: 0 % — SIGNIFICANT CHANGE UP (ref 0–1)
MICROCYTES BLD QL: SLIGHT — SIGNIFICANT CHANGE UP
MONOCYTES # BLD AUTO: 1.19 K/UL — HIGH (ref 0–0.9)
MONOCYTES # BLD AUTO: 1.21 K/UL — HIGH (ref 0–0.9)
MONOCYTES NFR BLD AUTO: 4.5 % — SIGNIFICANT CHANGE UP (ref 2–14)
MONOCYTES NFR BLD AUTO: 6.2 % — SIGNIFICANT CHANGE UP (ref 2–14)
MONOCYTES NFR BLD: 7.5 % — SIGNIFICANT CHANGE UP (ref 2–9)
MYELOCYTES NFR BLD: 3.8 % — HIGH (ref 0–0)
NEUTROPHIL AB SER-ACNC: 81.1 % — HIGH (ref 43–77)
NEUTROPHILS # BLD AUTO: 15.42 K/UL — HIGH (ref 1.8–7.4)
NEUTROPHILS # BLD AUTO: 22.52 K/UL — HIGH (ref 1.8–7.4)
NEUTROPHILS NFR BLD AUTO: 78.5 % — HIGH (ref 43–77)
NEUTROPHILS NFR BLD AUTO: 84.7 % — HIGH (ref 43–77)
NEUTS BAND # BLD: 0 % — SIGNIFICANT CHANGE UP (ref 0–6)
NRBC # FLD: 0 K/UL — SIGNIFICANT CHANGE UP (ref 0–0)
NRBC # FLD: 0 K/UL — SIGNIFICANT CHANGE UP (ref 0–0)
OTHER - HEMATOLOGY %: 0 — SIGNIFICANT CHANGE UP
PLATELET # BLD AUTO: 455 K/UL — HIGH (ref 150–400)
PLATELET # BLD AUTO: 493 K/UL — HIGH (ref 150–400)
PLATELET COUNT - ESTIMATE: NORMAL — SIGNIFICANT CHANGE UP
PMV BLD: 10.4 FL — SIGNIFICANT CHANGE UP (ref 7–13)
PMV BLD: 9.7 FL — SIGNIFICANT CHANGE UP (ref 7–13)
POTASSIUM SERPL-MCNC: 4.4 MMOL/L — SIGNIFICANT CHANGE UP (ref 3.5–5.3)
POTASSIUM SERPL-MCNC: 5 MMOL/L — SIGNIFICANT CHANGE UP (ref 3.5–5.3)
POTASSIUM SERPL-SCNC: 4.4 MMOL/L — SIGNIFICANT CHANGE UP (ref 3.5–5.3)
POTASSIUM SERPL-SCNC: 5 MMOL/L — SIGNIFICANT CHANGE UP (ref 3.5–5.3)
PROMYELOCYTES # FLD: 0 % — SIGNIFICANT CHANGE UP (ref 0–0)
RBC # BLD: 4.44 M/UL — SIGNIFICANT CHANGE UP (ref 3.8–5.2)
RBC # BLD: 5.08 M/UL — SIGNIFICANT CHANGE UP (ref 3.8–5.2)
RBC # FLD: 12.4 % — SIGNIFICANT CHANGE UP (ref 10.3–14.5)
RBC # FLD: 12.5 % — SIGNIFICANT CHANGE UP (ref 10.3–14.5)
REVIEW TO FOLLOW: YES — SIGNIFICANT CHANGE UP
RH IG SCN BLD-IMP: POSITIVE — SIGNIFICANT CHANGE UP
SMUDGE CELLS # BLD: PRESENT — SIGNIFICANT CHANGE UP
SODIUM SERPL-SCNC: 131 MMOL/L — LOW (ref 135–145)
SODIUM SERPL-SCNC: 135 MMOL/L — SIGNIFICANT CHANGE UP (ref 135–145)
SPHEROCYTES BLD QL SMEAR: SLIGHT — SIGNIFICANT CHANGE UP
VARIANT LYMPHS # BLD: 3.8 % — SIGNIFICANT CHANGE UP
WBC # BLD: 19.61 K/UL — HIGH (ref 3.8–10.5)
WBC # BLD: 26.58 K/UL — HIGH (ref 3.8–10.5)
WBC # FLD AUTO: 19.61 K/UL — HIGH (ref 3.8–10.5)
WBC # FLD AUTO: 26.58 K/UL — HIGH (ref 3.8–10.5)

## 2020-09-12 PROCEDURE — 88304 TISSUE EXAM BY PATHOLOGIST: CPT | Mod: 26

## 2020-09-12 PROCEDURE — 22633 ARTHRD CMBN 1NTRSPC LUMBAR: CPT | Mod: 22

## 2020-09-12 PROCEDURE — 88311 DECALCIFY TISSUE: CPT | Mod: 26

## 2020-09-12 PROCEDURE — 22840 INSERT SPINE FIXATION DEVICE: CPT

## 2020-09-12 PROCEDURE — 22214 INCIS 1 VERTEBRAL SEG LUMBAR: CPT | Mod: 22

## 2020-09-12 PROCEDURE — 99233 SBSQ HOSP IP/OBS HIGH 50: CPT

## 2020-09-12 PROCEDURE — 22853 INSJ BIOMECHANICAL DEVICE: CPT

## 2020-09-12 RX ORDER — ONDANSETRON 8 MG/1
4 TABLET, FILM COATED ORAL ONCE
Refills: 0 | Status: DISCONTINUED | OUTPATIENT
Start: 2020-09-12 | End: 2020-09-12

## 2020-09-12 RX ORDER — HYDROMORPHONE HYDROCHLORIDE 2 MG/ML
0.5 INJECTION INTRAMUSCULAR; INTRAVENOUS; SUBCUTANEOUS
Refills: 0 | Status: DISCONTINUED | OUTPATIENT
Start: 2020-09-12 | End: 2020-09-12

## 2020-09-12 RX ORDER — OXYCODONE HYDROCHLORIDE 5 MG/1
10 TABLET ORAL EVERY 12 HOURS
Refills: 0 | Status: DISCONTINUED | OUTPATIENT
Start: 2020-09-12 | End: 2020-09-12

## 2020-09-12 RX ORDER — ACETAMINOPHEN 500 MG
650 TABLET ORAL EVERY 6 HOURS
Refills: 0 | Status: DISCONTINUED | OUTPATIENT
Start: 2020-09-12 | End: 2020-09-14

## 2020-09-12 RX ORDER — HYDROMORPHONE HYDROCHLORIDE 2 MG/ML
1 INJECTION INTRAMUSCULAR; INTRAVENOUS; SUBCUTANEOUS
Refills: 0 | Status: DISCONTINUED | OUTPATIENT
Start: 2020-09-12 | End: 2020-09-12

## 2020-09-12 RX ORDER — OXYCODONE HYDROCHLORIDE 5 MG/1
10 TABLET ORAL EVERY 12 HOURS
Refills: 0 | Status: DISCONTINUED | OUTPATIENT
Start: 2020-09-12 | End: 2020-09-13

## 2020-09-12 RX ORDER — SODIUM CHLORIDE 9 MG/ML
1000 INJECTION INTRAMUSCULAR; INTRAVENOUS; SUBCUTANEOUS
Refills: 0 | Status: DISCONTINUED | OUTPATIENT
Start: 2020-09-12 | End: 2020-09-17

## 2020-09-12 RX ORDER — DIAZEPAM 5 MG
5 TABLET ORAL EVERY 8 HOURS
Refills: 0 | Status: DISCONTINUED | OUTPATIENT
Start: 2020-09-12 | End: 2020-09-14

## 2020-09-12 RX ADMIN — GABAPENTIN 200 MILLIGRAM(S): 400 CAPSULE ORAL at 23:24

## 2020-09-12 RX ADMIN — HYDROMORPHONE HYDROCHLORIDE 1 MILLIGRAM(S): 2 INJECTION INTRAMUSCULAR; INTRAVENOUS; SUBCUTANEOUS at 18:28

## 2020-09-12 RX ADMIN — SENNA PLUS 2 TABLET(S): 8.6 TABLET ORAL at 12:12

## 2020-09-12 RX ADMIN — OXYCODONE HYDROCHLORIDE 10 MILLIGRAM(S): 5 TABLET ORAL at 08:00

## 2020-09-12 RX ADMIN — HYDROMORPHONE HYDROCHLORIDE 1 MILLIGRAM(S): 2 INJECTION INTRAMUSCULAR; INTRAVENOUS; SUBCUTANEOUS at 18:39

## 2020-09-12 RX ADMIN — Medication 650 MILLIGRAM(S): at 07:00

## 2020-09-12 RX ADMIN — OXYCODONE HYDROCHLORIDE 10 MILLIGRAM(S): 5 TABLET ORAL at 12:00

## 2020-09-12 RX ADMIN — Medication 5 MILLIGRAM(S): at 20:49

## 2020-09-12 RX ADMIN — HYDROMORPHONE HYDROCHLORIDE 1 MILLIGRAM(S): 2 INJECTION INTRAMUSCULAR; INTRAVENOUS; SUBCUTANEOUS at 18:46

## 2020-09-12 RX ADMIN — Medication 5 MILLIGRAM(S): at 09:25

## 2020-09-12 RX ADMIN — SENNA PLUS 2 TABLET(S): 8.6 TABLET ORAL at 23:24

## 2020-09-12 RX ADMIN — HYDROMORPHONE HYDROCHLORIDE 1 MILLIGRAM(S): 2 INJECTION INTRAMUSCULAR; INTRAVENOUS; SUBCUTANEOUS at 20:46

## 2020-09-12 RX ADMIN — OXYCODONE HYDROCHLORIDE 10 MILLIGRAM(S): 5 TABLET ORAL at 11:08

## 2020-09-12 RX ADMIN — Medication 4 MILLIGRAM(S): at 07:00

## 2020-09-12 RX ADMIN — GABAPENTIN 200 MILLIGRAM(S): 400 CAPSULE ORAL at 12:19

## 2020-09-12 RX ADMIN — SODIUM CHLORIDE 125 MILLILITER(S): 9 INJECTION INTRAMUSCULAR; INTRAVENOUS; SUBCUTANEOUS at 18:29

## 2020-09-12 RX ADMIN — Medication 650 MILLIGRAM(S): at 23:24

## 2020-09-12 NOTE — CHART NOTE - NSCHARTNOTEFT_GEN_A_CORE
ORTHOPEDIC POSTOP NOTE     Patient underwent L4-5 laminectomy, discectomy, instrumented PSF with interbody fusion, tolerated the procedure and was sent to PACU in stable condition.  She is now on the surgical floor, complaining of severe low back pain poorly controlled with medications, in distress.  No radiation of pain down legs, no numbness/tingling, no nausea/vomiting.      Vital Signs Last 24 Hrs  T(C): 36.6 (12 Sep 2020 21:42), Max: 37.1 (12 Sep 2020 18:25)  T(F): 97.8 (12 Sep 2020 21:42), Max: 98.8 (12 Sep 2020 18:25)  HR: 77 (12 Sep 2020 21:42) (68 - 119)  BP: 139/89 (12 Sep 2020 21:42) (123/83 - 145/91)  BP(mean): 91 (12 Sep 2020 19:45) (81 - 99)  RR: 18 (12 Sep 2020 21:42) (9 - 23)  SpO2: 97% (12 Sep 2020 21:42) (92% - 99%)    Physical exam:  Gen: significant distress d/t pain  Back: Dressing Clean/Dry/Intact,  HV intact with SS output  LLE: L3-S1 motor 5/5                     SILT L2-S1                     foot wwp, cap refill <2s  RLE: L3-S1 motor 5/5                    SILT L2-S1                     foot wwp, cap refill <2s    Labs:                          13.4   26.58 )-----------( 455      ( 12 Sep 2020 18:30 )             40.3       09-12    131<L>  |  95<L>  |  21  ----------------------------<  104<H>  5.0   |  21<L>  |  0.56    Ca    8.6      12 Sep 2020 18:30        Assessment: 25y Female s/p L4-5 lumbar laminectomy/discectomy and instrumented PSF with TLIF, POD #0    Plan:  - Appreciate pain mgmt recs, will need pain meds adjusted  - dilaudid breakthrough medication given for now, oxycontin added for AM  - Continue HV drain, monitor output  - WBAT  - PT/OOB  - DVT ppx- venodynes  - Incentive spirometer  - vang out in AM when OOB

## 2020-09-12 NOTE — PROGRESS NOTE ADULT - SUBJECTIVE AND OBJECTIVE BOX
Orthopaedic Surgery Progress Note    Subjective:   Patient seen and examined  No acute events overnight  Pain well controlled  NPO for OR today    Objective:  T(C): 36.9 (09-12-20 @ 12:58), Max: 36.9 (09-12-20 @ 10:17)  HR: 88 (09-12-20 @ 12:58) (68 - 94)  BP: 130/99 (09-12-20 @ 12:58) (125/77 - 145/91)  RR: 16 (09-12-20 @ 12:58) (16 - 20)  SpO2: 96% (09-12-20 @ 12:58) (94% - 99%)  Wt(kg): --    09-12 @ 07:01  -  09-12 @ 15:53  --------------------------------------------------------  IN: 0 mL / OUT: 0 mL / NET: 0 mL        PE    NAD  Neuro:  RLE IP 5/5 HS 5/5 Q 5/5 GS 5/5 TA 5/5 EHL 5/5   SILT L2-S1  LLE IP 5/5 HS 5/5 Q 5/5 GS 4/5 TA 4/5 EHL 4/5  SILT L2-S1  WWP BLE                          15.4   19.61 )-----------( 493      ( 12 Sep 2020 07:50 )             47.2     09-12    135  |  94<L>  |  18  ----------------------------<  107<H>  4.4   |  24  |  0.49<L>    Ca    9.6      12 Sep 2020 07:50      PT/INR - ( 11 Sep 2020 06:39 )   PT: 10.8 SEC;   INR: 0.94          PTT - ( 11 Sep 2020 06:39 )  PTT:27.6 SEC      25y Female with back pain and large HNP at L4-5, planning for OR today for posterior spinal fusion  - Pain control  - FU labs  - PT/OT/OOB  - I/S  - NPO/IVF  - SCDs  - Dispo: OR today

## 2020-09-12 NOTE — PROGRESS NOTE ADULT - SUBJECTIVE AND OBJECTIVE BOX
Dr. Karin Abrams  Pager [phone]    PROGRESS NOTE:     Patient is a 25y old  Female who presents with a chief complaint of     SUBJECTIVE / OVERNIGHT EVENTS: pt is scheduled for OR today  ADDITIONAL REVIEW OF SYSTEMS: denies chest pain/sob/dizziness, +family hx of HCM    MEDICATIONS  (STANDING):  acetaminophen   Tablet .. 650 milliGRAM(s) Oral every 6 hours  dexAMETHasone  Injectable 4 milliGRAM(s) IV Push every 8 hours  gabapentin 200 milliGRAM(s) Oral every 8 hours  nicotine  Polacrilex Gum 4 milliGRAM(s) Oral every 4 hours  oxyCODONE  ER Tablet 10 milliGRAM(s) Oral every 12 hours  pantoprazole    Tablet 40 milliGRAM(s) Oral before breakfast  senna 2 Tablet(s) Oral at bedtime    MEDICATIONS  (PRN):  diazepam    Tablet 5 milliGRAM(s) Oral every 8 hours PRN spasms  diphenhydrAMINE 25 milliGRAM(s) Oral every 4 hours PRN Rash and/or Itching  HYDROmorphone  Injectable 0.5 milliGRAM(s) IV Push every 4 hours PRN breakthrough pain  magnesium hydroxide Suspension 30 milliLiter(s) Oral daily PRN Constipation  oxyCODONE    IR 10 milliGRAM(s) Oral every 4 hours PRN Severe Pain (7 - 10)  oxyCODONE    IR 5 milliGRAM(s) Oral every 4 hours PRN Moderate Pain (4 - 6)      CAPILLARY BLOOD GLUCOSE        I&O's Summary    12 Sep 2020 07:01  -  12 Sep 2020 13:39  --------------------------------------------------------  IN: 0 mL / OUT: 0 mL / NET: 0 mL        PHYSICAL EXAM:  Vital Signs Last 24 Hrs  T(C): 36.9 (12 Sep 2020 12:58), Max: 36.9 (11 Sep 2020 13:58)  T(F): 98.4 (12 Sep 2020 12:58), Max: 98.5 (11 Sep 2020 13:58)  HR: 88 (12 Sep 2020 12:58) (79 - 94)  BP: 130/99 (12 Sep 2020 12:58) (112/73 - 145/91)  BP(mean): --  RR: 16 (12 Sep 2020 12:58) (16 - 20)  SpO2: 96% (12 Sep 2020 12:58) (94% - 97%)  CONSTITUTIONAL: NAD, well-developed  RESPIRATORY: Normal respiratory effort; lungs are clear to auscultation bilaterally  CARDIOVASCULAR: Regular rate and rhythm, normal S1 and S2, no murmur/rub/gallop; No lower extremity edema; Peripheral pulses are 2+ bilaterally  ABDOMEN: Nontender to palpation, normoactive bowel sounds, no rebound/guarding; No hepatosplenomegaly  MUSCULOSKELETAL: no clubbing or cyanosis of digits; no joint swelling or tenderness to palpation  PSYCH: A+O to person, place, and time; affect appropriate    LABS:                        15.4   19.61 )-----------( 493      ( 12 Sep 2020 07:50 )             47.2     09-12    135  |  94<L>  |  18  ----------------------------<  107<H>  4.4   |  24  |  0.49<L>    Ca    9.6      12 Sep 2020 07:50      PT/INR - ( 11 Sep 2020 06:39 )   PT: 10.8 SEC;   INR: 0.94          PTT - ( 11 Sep 2020 06:39 )  PTT:27.6 SEC        RADIOLOGY & ADDITIONAL TESTS:  Results Reviewed:   Imaging Personally Reviewed:    Electrocardiogram Personally Reviewed:    COORDINATION OF CARE:  Care Discussed with Consultants/Other Providers [Y/N]:  Prior or Outpatient Records Reviewed [Y/N]:

## 2020-09-12 NOTE — PROGRESS NOTE ADULT - PROBLEM SELECTOR PLAN 3
-WBC 16, afebrile  -likely from dexamethasone  -observe off abx, trend WBC. -WBC 16-->19, afebrile  -likely from dexamethasone  -observe off abx, trend WBC.

## 2020-09-12 NOTE — PROGRESS NOTE ADULT - ATTENDING COMMENTS
Patient seen and examined.  24 y/o female known to me for lumbar DDD now with massive L4/5 HNP with progressive LLE weakness.  D/w patient nonsurgical and surgical options.  We have discussed decompression as well as fusion.  Given the large disc herniation, I have explained that iatrogenic destabilization may be necessary to adequately decompress.  Patient wishes to proceed with decompression and fusion rather decompression alone after discussion of risks and benefits.   The nature of the planned surgery, other options available to the patient, the risks and possible complications of this surgery and the other options, including but not limited to death, paralysis, nerve damage, infection, bleeding, failure of the surgery to relieve  symptoms, failure of fusion, dislodgement of interbody graft, hardware failure/breakage/loosening, use of DBM and inherent risks pulmonary and/or cardiac complications, DVT, PE, UTI, spinal fluid leakage, possible need for further surgery in the future, adjacent segment deterioration, etc were discussed with the patient at length and the patient understands and wishes to proceed.

## 2020-09-13 DIAGNOSIS — Z82.49 FAMILY HISTORY OF ISCHEMIC HEART DISEASE AND OTHER DISEASES OF THE CIRCULATORY SYSTEM: ICD-10-CM

## 2020-09-13 LAB
ANION GAP SERPL CALC-SCNC: 17 MMO/L — HIGH (ref 7–14)
BASOPHILS # BLD AUTO: 0.09 K/UL — SIGNIFICANT CHANGE UP (ref 0–0.2)
BASOPHILS NFR BLD AUTO: 0.3 % — SIGNIFICANT CHANGE UP (ref 0–2)
BUN SERPL-MCNC: 24 MG/DL — HIGH (ref 7–23)
CALCIUM SERPL-MCNC: 8.9 MG/DL — SIGNIFICANT CHANGE UP (ref 8.4–10.5)
CHLORIDE SERPL-SCNC: 96 MMOL/L — LOW (ref 98–107)
CO2 SERPL-SCNC: 21 MMOL/L — LOW (ref 22–31)
CREAT SERPL-MCNC: 0.54 MG/DL — SIGNIFICANT CHANGE UP (ref 0.5–1.3)
EOSINOPHIL # BLD AUTO: 0.01 K/UL — SIGNIFICANT CHANGE UP (ref 0–0.5)
EOSINOPHIL NFR BLD AUTO: 0 % — SIGNIFICANT CHANGE UP (ref 0–6)
GLUCOSE SERPL-MCNC: 136 MG/DL — HIGH (ref 70–99)
HCT VFR BLD CALC: 39.9 % — SIGNIFICANT CHANGE UP (ref 34.5–45)
HGB BLD-MCNC: 13.3 G/DL — SIGNIFICANT CHANGE UP (ref 11.5–15.5)
IMM GRANULOCYTES NFR BLD AUTO: 3.7 % — HIGH (ref 0–1.5)
LYMPHOCYTES # BLD AUTO: 1.6 K/UL — SIGNIFICANT CHANGE UP (ref 1–3.3)
LYMPHOCYTES # BLD AUTO: 5.5 % — LOW (ref 13–44)
MCHC RBC-ENTMCNC: 31.1 PG — SIGNIFICANT CHANGE UP (ref 27–34)
MCHC RBC-ENTMCNC: 33.3 % — SIGNIFICANT CHANGE UP (ref 32–36)
MCV RBC AUTO: 93.2 FL — SIGNIFICANT CHANGE UP (ref 80–100)
MONOCYTES # BLD AUTO: 2.48 K/UL — HIGH (ref 0–0.9)
MONOCYTES NFR BLD AUTO: 8.5 % — SIGNIFICANT CHANGE UP (ref 2–14)
NEUTROPHILS # BLD AUTO: 24.05 K/UL — HIGH (ref 1.8–7.4)
NEUTROPHILS NFR BLD AUTO: 82 % — HIGH (ref 43–77)
NRBC # FLD: 0 K/UL — SIGNIFICANT CHANGE UP (ref 0–0)
PLATELET # BLD AUTO: 554 K/UL — HIGH (ref 150–400)
PMV BLD: 10.1 FL — SIGNIFICANT CHANGE UP (ref 7–13)
POTASSIUM SERPL-MCNC: 3.8 MMOL/L — SIGNIFICANT CHANGE UP (ref 3.5–5.3)
POTASSIUM SERPL-SCNC: 3.8 MMOL/L — SIGNIFICANT CHANGE UP (ref 3.5–5.3)
RBC # BLD: 4.28 M/UL — SIGNIFICANT CHANGE UP (ref 3.8–5.2)
RBC # FLD: 13 % — SIGNIFICANT CHANGE UP (ref 10.3–14.5)
SODIUM SERPL-SCNC: 134 MMOL/L — LOW (ref 135–145)
WBC # BLD: 29.32 K/UL — HIGH (ref 3.8–10.5)
WBC # FLD AUTO: 29.32 K/UL — HIGH (ref 3.8–10.5)

## 2020-09-13 PROCEDURE — 99233 SBSQ HOSP IP/OBS HIGH 50: CPT

## 2020-09-13 RX ORDER — HYDROMORPHONE HYDROCHLORIDE 2 MG/ML
0.5 INJECTION INTRAMUSCULAR; INTRAVENOUS; SUBCUTANEOUS
Refills: 0 | Status: DISCONTINUED | OUTPATIENT
Start: 2020-09-13 | End: 2020-09-14

## 2020-09-13 RX ORDER — ACETAMINOPHEN 500 MG
1000 TABLET ORAL ONCE
Refills: 0 | Status: COMPLETED | OUTPATIENT
Start: 2020-09-13 | End: 2020-09-13

## 2020-09-13 RX ORDER — OXYCODONE HYDROCHLORIDE 5 MG/1
10 TABLET ORAL
Refills: 0 | Status: DISCONTINUED | OUTPATIENT
Start: 2020-09-13 | End: 2020-09-13

## 2020-09-13 RX ORDER — ACETAMINOPHEN 500 MG
1000 TABLET ORAL ONCE
Refills: 0 | Status: DISCONTINUED | OUTPATIENT
Start: 2020-09-13 | End: 2020-09-14

## 2020-09-13 RX ORDER — HYDROMORPHONE HYDROCHLORIDE 2 MG/ML
0.5 INJECTION INTRAMUSCULAR; INTRAVENOUS; SUBCUTANEOUS ONCE
Refills: 0 | Status: DISCONTINUED | OUTPATIENT
Start: 2020-09-13 | End: 2020-09-13

## 2020-09-13 RX ORDER — OXYCODONE HYDROCHLORIDE 5 MG/1
15 TABLET ORAL
Refills: 0 | Status: DISCONTINUED | OUTPATIENT
Start: 2020-09-13 | End: 2020-09-13

## 2020-09-13 RX ORDER — HYDROMORPHONE HYDROCHLORIDE 2 MG/ML
30 INJECTION INTRAMUSCULAR; INTRAVENOUS; SUBCUTANEOUS
Refills: 0 | Status: DISCONTINUED | OUTPATIENT
Start: 2020-09-13 | End: 2020-09-13

## 2020-09-13 RX ORDER — HYDROMORPHONE HYDROCHLORIDE 2 MG/ML
30 INJECTION INTRAMUSCULAR; INTRAVENOUS; SUBCUTANEOUS
Refills: 0 | Status: DISCONTINUED | OUTPATIENT
Start: 2020-09-13 | End: 2020-09-14

## 2020-09-13 RX ORDER — HYDROMORPHONE HYDROCHLORIDE 2 MG/ML
0.5 INJECTION INTRAMUSCULAR; INTRAVENOUS; SUBCUTANEOUS
Refills: 0 | Status: DISCONTINUED | OUTPATIENT
Start: 2020-09-13 | End: 2020-09-13

## 2020-09-13 RX ADMIN — HYDROMORPHONE HYDROCHLORIDE 30 MILLILITER(S): 2 INJECTION INTRAMUSCULAR; INTRAVENOUS; SUBCUTANEOUS at 20:17

## 2020-09-13 RX ADMIN — Medication 650 MILLIGRAM(S): at 06:45

## 2020-09-13 RX ADMIN — HYDROMORPHONE HYDROCHLORIDE 0.5 MILLIGRAM(S): 2 INJECTION INTRAMUSCULAR; INTRAVENOUS; SUBCUTANEOUS at 10:15

## 2020-09-13 RX ADMIN — OXYCODONE HYDROCHLORIDE 15 MILLIGRAM(S): 5 TABLET ORAL at 09:12

## 2020-09-13 RX ADMIN — HYDROMORPHONE HYDROCHLORIDE 30 MILLILITER(S): 2 INJECTION INTRAMUSCULAR; INTRAVENOUS; SUBCUTANEOUS at 11:17

## 2020-09-13 RX ADMIN — HYDROMORPHONE HYDROCHLORIDE 0.5 MILLIGRAM(S): 2 INJECTION INTRAMUSCULAR; INTRAVENOUS; SUBCUTANEOUS at 19:02

## 2020-09-13 RX ADMIN — HYDROMORPHONE HYDROCHLORIDE 0.5 MILLIGRAM(S): 2 INJECTION INTRAMUSCULAR; INTRAVENOUS; SUBCUTANEOUS at 14:28

## 2020-09-13 RX ADMIN — HYDROMORPHONE HYDROCHLORIDE 0.5 MILLIGRAM(S): 2 INJECTION INTRAMUSCULAR; INTRAVENOUS; SUBCUTANEOUS at 10:30

## 2020-09-13 RX ADMIN — Medication 400 MILLIGRAM(S): at 11:18

## 2020-09-13 RX ADMIN — SENNA PLUS 2 TABLET(S): 8.6 TABLET ORAL at 22:11

## 2020-09-13 RX ADMIN — HYDROMORPHONE HYDROCHLORIDE 0.5 MILLIGRAM(S): 2 INJECTION INTRAMUSCULAR; INTRAVENOUS; SUBCUTANEOUS at 07:00

## 2020-09-13 RX ADMIN — OXYCODONE HYDROCHLORIDE 15 MILLIGRAM(S): 5 TABLET ORAL at 10:12

## 2020-09-13 RX ADMIN — OXYCODONE HYDROCHLORIDE 10 MILLIGRAM(S): 5 TABLET ORAL at 00:54

## 2020-09-13 RX ADMIN — HYDROMORPHONE HYDROCHLORIDE 30 MILLILITER(S): 2 INJECTION INTRAMUSCULAR; INTRAVENOUS; SUBCUTANEOUS at 08:31

## 2020-09-13 RX ADMIN — Medication 5 MILLIGRAM(S): at 23:36

## 2020-09-13 RX ADMIN — Medication 400 MILLIGRAM(S): at 18:46

## 2020-09-13 RX ADMIN — HYDROMORPHONE HYDROCHLORIDE 0.5 MILLIGRAM(S): 2 INJECTION INTRAMUSCULAR; INTRAVENOUS; SUBCUTANEOUS at 18:47

## 2020-09-13 RX ADMIN — HYDROMORPHONE HYDROCHLORIDE 0.5 MILLIGRAM(S): 2 INJECTION INTRAMUSCULAR; INTRAVENOUS; SUBCUTANEOUS at 22:00

## 2020-09-13 RX ADMIN — GABAPENTIN 200 MILLIGRAM(S): 400 CAPSULE ORAL at 06:45

## 2020-09-13 RX ADMIN — HYDROMORPHONE HYDROCHLORIDE 0.5 MILLIGRAM(S): 2 INJECTION INTRAMUSCULAR; INTRAVENOUS; SUBCUTANEOUS at 06:45

## 2020-09-13 RX ADMIN — Medication 5 MILLIGRAM(S): at 06:45

## 2020-09-13 RX ADMIN — HYDROMORPHONE HYDROCHLORIDE 0.5 MILLIGRAM(S): 2 INJECTION INTRAMUSCULAR; INTRAVENOUS; SUBCUTANEOUS at 14:57

## 2020-09-13 RX ADMIN — Medication 5 MILLIGRAM(S): at 16:03

## 2020-09-13 NOTE — PROGRESS NOTE ADULT - SUBJECTIVE AND OBJECTIVE BOX
Anesthesia Pain Management Service    SUBJECTIVE: Pt doing well with IV PCA without problems reported.    Therapy:	  [ X] IV PCA	   [ ] Epidural           [ ] s/p Spinal Opoid              [ ] Postpartum infusion	  [ ] Patient controlled regional anesthesia (PCRA)    [ ] prn Analgesics    Allergies    penicillins (Rash)  sulfa drugs (Rash)    Intolerances      MEDICATIONS  (STANDING):  acetaminophen   Tablet .. 650 milliGRAM(s) Oral every 6 hours  acetaminophen  IVPB .. 1000 milliGRAM(s) IV Intermittent once  gabapentin 400 milliGRAM(s) Oral every 8 hours  HYDROmorphone PCA (1 mG/mL) 30 milliLiter(s) PCA Continuous PCA Continuous  nicotine  Polacrilex Gum 4 milliGRAM(s) Oral every 4 hours  oxyCODONE  ER Tablet 20 milliGRAM(s) Oral every 12 hours  pantoprazole    Tablet 40 milliGRAM(s) Oral before breakfast  senna 2 Tablet(s) Oral at bedtime  sodium chloride 0.9%. 1000 milliLiter(s) (125 mL/Hr) IV Continuous <Continuous>    MEDICATIONS  (PRN):  diazepam    Tablet 5 milliGRAM(s) Oral every 8 hours PRN spasms  diphenhydrAMINE 25 milliGRAM(s) Oral every 4 hours PRN Rash and/or Itching  HYDROmorphone PCA (1 mG/mL) Rescue Clinician Bolus 0.5 milliGRAM(s) IV Push every 15 minutes PRN for Pain Scale GREATER THAN 6  magnesium hydroxide Suspension 30 milliLiter(s) Oral daily PRN Constipation  naloxone Injectable 0.1 milliGRAM(s) IV Push every 3 minutes PRN For ANY of the following changes in patient status:  A. RR LESS THAN 10 breaths per minute, B. Oxygen saturation LESS THAN 90%, C. Sedation score of 6  ondansetron Injectable 4 milliGRAM(s) IV Push every 6 hours PRN Nausea      OBJECTIVE:   [X] No new signs     [ ] Other:    Side Effects:  [X ] None			[ ] Other:    Assessment of Catheter Site:		[ ] Intact		[ ] Other:    ASSESSMENT/PLAN  [ X] Continue current therapy    [ ] Therapy changed to:    [ ] IV PCA       [ ] Epidural     [ ] prn Analgesics     Comments:

## 2020-09-13 NOTE — PHYSICAL THERAPY INITIAL EVALUATION ADULT - PERTINENT HX OF CURRENT PROBLEM, REHAB EVAL
patient presents status post L4-5 laminectomy, discectomy, posterior spinal fusion, interbody fusion. PMH includes hypermobility syndrome, L5-S1 disc herniation with chronic low back pain with LLE weakness, endometriosis, pre-HCM (FHx of HCM, echo with borderline LVH, no phenotypic expression of HCM at present)

## 2020-09-13 NOTE — PROGRESS NOTE ADULT - SUBJECTIVE AND OBJECTIVE BOX
Anesthesia Pain Management Service    SUBJECTIVE: I'm still  in pain     Pain Scale Score	At rest: _10__ 	With Activity: ___ 	[X ] Refer to charted pain scores    THERAPY:    [ ] IV PCA Morphine		[ ] 5 mg/mL	[ ] 1 mg/mL  [X ] IV PCA Hydromorphone	[ ] 5 mg/mL	[X ] 1 mg/mL  [ ] IV PCA Fentanyl		[ ] 50 micrograms/mL    Demand dose __0.3_ lockout __6_ (minutes) Continuous Rate _0__ Total: _8.7__   mg used (in past 24 hrs)      MEDICATIONS  (STANDING):  acetaminophen   Tablet .. 650 milliGRAM(s) Oral every 6 hours  acetaminophen  IVPB .. 1000 milliGRAM(s) IV Intermittent once  acetaminophen  IVPB .. 1000 milliGRAM(s) IV Intermittent once  gabapentin 400 milliGRAM(s) Oral every 8 hours  nicotine  Polacrilex Gum 4 milliGRAM(s) Oral every 4 hours  oxyCODONE  ER Tablet 10 milliGRAM(s) Oral every 12 hours  pantoprazole    Tablet 40 milliGRAM(s) Oral before breakfast  senna 2 Tablet(s) Oral at bedtime  sodium chloride 0.9%. 1000 milliLiter(s) (125 mL/Hr) IV Continuous <Continuous>    MEDICATIONS  (PRN):  diazepam    Tablet 5 milliGRAM(s) Oral every 8 hours PRN spasms  diphenhydrAMINE 25 milliGRAM(s) Oral every 4 hours PRN Rash and/or Itching  HYDROmorphone  Injectable 0.5 milliGRAM(s) IV Push every 3 hours PRN Severe Breakthrough Pain  magnesium hydroxide Suspension 30 milliLiter(s) Oral daily PRN Constipation  oxyCODONE    IR 10 milliGRAM(s) Oral every 3 hours PRN Moderate Pain (4 - 6)  oxyCODONE    IR 15 milliGRAM(s) Oral every 3 hours PRN Severe Pain (7 - 10)      OBJECTIVE: sitting in chair     Sedation Score:	[ X] Alert	[ ] Drowsy 	[ ] Arousable	[ ] Asleep	[ ] Unresponsive    Side Effects:	[X ] None	[ ] Nausea	[ ] Vomiting	[ ] Pruritus  		[ ] Other:    Vital Signs Last 24 Hrs  T(C): 36.3 (13 Sep 2020 09:25), Max: 37.1 (12 Sep 2020 18:25)  T(F): 97.3 (13 Sep 2020 09:25), Max: 98.8 (12 Sep 2020 18:25)  HR: 91 (13 Sep 2020 09:25) (68 - 119)  BP: 147/93 (13 Sep 2020 09:25) (123/83 - 147/93)  BP(mean): 91 (12 Sep 2020 19:45) (81 - 99)  RR: 18 (13 Sep 2020 09:25) (9 - 23)  SpO2: 95% (13 Sep 2020 09:25) (92% - 99%)    ASSESSMENT/ PLAN    Therapy to  be:	[ ] Continue   [ X] Discontinued   [X ] Change to prn Analgesics    Documentation and Verification of current medications:   [X] Done	[ ] Not done, not elligible    Comments: Patient experiencing high anxiety and believes pain is contributing to it, after a lengthy discussion with patient about pain regimen she is willing to try it out but wants psych involved as her anxiety isn't being controlled PRN Oral/IV opioids, continue long acting opioid and/or Adjuvant medication to be ordered at this point.    Progress Note written now but Patient was seen earlier.

## 2020-09-13 NOTE — PROVIDER CONTACT NOTE (MEDICATION) - RECOMMENDATIONS
please put one time dose of 0.5mg dilaudid IVP to hold patient over until pump is no longer locked out for the four hour limit

## 2020-09-13 NOTE — PROGRESS NOTE ADULT - SUBJECTIVE AND OBJECTIVE BOX
Dr. Karin Abrams  Pager [phone]    PROGRESS NOTE:     Patient is a 25y old  Female who presents with a chief complaint of preop laminectomy and fusion (12 Sep 2020 13:39)      SUBJECTIVE / OVERNIGHT EVENTS: pt c/o severe postop pain, not controlled on current regimen  ADDITIONAL REVIEW OF SYSTEMS: denies chest pain/sob/dizziness    MEDICATIONS  (STANDING):  acetaminophen   Tablet .. 650 milliGRAM(s) Oral every 6 hours  acetaminophen  IVPB .. 1000 milliGRAM(s) IV Intermittent once  gabapentin 400 milliGRAM(s) Oral every 8 hours  HYDROmorphone PCA (1 mG/mL) 30 milliLiter(s) PCA Continuous PCA Continuous  nicotine  Polacrilex Gum 4 milliGRAM(s) Oral every 4 hours  oxyCODONE  ER Tablet 20 milliGRAM(s) Oral every 12 hours  pantoprazole    Tablet 40 milliGRAM(s) Oral before breakfast  senna 2 Tablet(s) Oral at bedtime  sodium chloride 0.9%. 1000 milliLiter(s) (125 mL/Hr) IV Continuous <Continuous>    MEDICATIONS  (PRN):  diazepam    Tablet 5 milliGRAM(s) Oral every 8 hours PRN spasms  diphenhydrAMINE 25 milliGRAM(s) Oral every 4 hours PRN Rash and/or Itching  HYDROmorphone PCA (1 mG/mL) Rescue Clinician Bolus 0.5 milliGRAM(s) IV Push every 15 minutes PRN for Pain Scale GREATER THAN 6  magnesium hydroxide Suspension 30 milliLiter(s) Oral daily PRN Constipation  naloxone Injectable 0.1 milliGRAM(s) IV Push every 3 minutes PRN For ANY of the following changes in patient status:  A. RR LESS THAN 10 breaths per minute, B. Oxygen saturation LESS THAN 90%, C. Sedation score of 6  ondansetron Injectable 4 milliGRAM(s) IV Push every 6 hours PRN Nausea      CAPILLARY BLOOD GLUCOSE        I&O's Summary    12 Sep 2020 07:01  -  13 Sep 2020 07:00  --------------------------------------------------------  IN: 375 mL / OUT: 1825 mL / NET: -1450 mL    13 Sep 2020 07:01  -  13 Sep 2020 13:48  --------------------------------------------------------  IN: 0 mL / OUT: 12 mL / NET: -12 mL        PHYSICAL EXAM:  Vital Signs Last 24 Hrs  T(C): 36.3 (13 Sep 2020 09:25), Max: 37.1 (12 Sep 2020 18:25)  T(F): 97.3 (13 Sep 2020 09:25), Max: 98.8 (12 Sep 2020 18:25)  HR: 91 (13 Sep 2020 10:26) (77 - 119)  BP: 138/88 (13 Sep 2020 10:26) (123/83 - 147/93)  BP(mean): 91 (12 Sep 2020 19:45) (81 - 99)  RR: 18 (13 Sep 2020 10:26) (9 - 23)  SpO2: 96% (13 Sep 2020 10:26) (92% - 98%)  CONSTITUTIONAL: NAD, well-developed  RESPIRATORY: Normal respiratory effort; lungs are clear to auscultation bilaterally  CARDIOVASCULAR: Regular rate and rhythm, normal S1 and S2, no murmur/rub/gallop; No lower extremity edema; Peripheral pulses are 2+ bilaterally  ABDOMEN: Nontender to palpation, normoactive bowel sounds, no rebound/guarding; No hepatosplenomegaly  MUSCULOSKELETAL: no clubbing or cyanosis of digits; no joint swelling or tenderness to palpation  Back: s/p laminectomy/fusion with wound dressing and drain  PSYCH: A+O to person, place, and time; affect appropriate    LABS:                        13.3   29.32 )-----------( 554      ( 13 Sep 2020 06:56 )             39.9     09-13    134<L>  |  96<L>  |  24<H>  ----------------------------<  136<H>  3.8   |  21<L>  |  0.54    Ca    8.9      13 Sep 2020 06:56      RADIOLOGY & ADDITIONAL TESTS:  Results Reviewed:   Imaging Personally Reviewed:  Electrocardiogram Personally Reviewed:    COORDINATION OF CARE:  Care Discussed with Consultants/Other Providers [Y/N]:  Prior or Outpatient Records Reviewed [Y/N]:

## 2020-09-13 NOTE — PROGRESS NOTE ADULT - SUBJECTIVE AND OBJECTIVE BOX
ORTHOPEDIC PROGRESS NOTE     Subjective: Patient seen and examined at bedside, complaining of acute pain overnight, somewhat improved this AM although still with back pain.  No radicular pain down legs, no numbness/tingling.  Getting out of bed to use restroom, vang pulled.     Vital Signs Last 24 Hrs  T(C): 36.4 (13 Sep 2020 02:32), Max: 37.1 (12 Sep 2020 18:25)  T(F): 97.5 (13 Sep 2020 02:32), Max: 98.8 (12 Sep 2020 18:25)  HR: 102 (13 Sep 2020 02:32) (68 - 119)  BP: 127/89 (13 Sep 2020 02:32) (123/83 - 145/91)  BP(mean): 91 (12 Sep 2020 19:45) (81 - 99)  RR: 18 (13 Sep 2020 02:32) (9 - 23)  SpO2: 98% (13 Sep 2020 02:32) (92% - 99%)    Physical exam:  Gen: NAD, resting in bed  Back: Dressing Clean/Dry/Intact,  HV intact with SS output  LLE: L3-S1 motor 5/5                     SILT L2-S1                     foot wwp, cap refill <2s  RLE: L3-S1 motor 5/5                    SILT L2-S1                     foot wwp, cap refill <2s    Labs:                          13.4   26.58 )-----------( 455      ( 12 Sep 2020 18:30 )             40.3       09-12    131<L>  |  95<L>  |  21  ----------------------------<  104<H>  5.0   |  21<L>  |  0.56    Ca    8.6      12 Sep 2020 18:30        Assessment: 25y Female s/p L4-5 lumbar laminectomy/discectomy and instrumented PSF with TLIF, POD #0    Plan:  - Appreciate pain mgmt recs, will need pain meds adjusted  - dilaudid breakthrough medication given for now, oxycontin added for AM  - Continue HV drain, monitor output  - WBAT  - PT/OOB  - DVT ppx- venodynes  - Incentive spirometer     ORTHOPEDIC PROGRESS NOTE     Subjective: Patient seen and examined at bedside, complaining of acute pain overnight, somewhat improved this AM although still with back pain that is poorly controlled with current PCA settings.  No radicular pain down legs, no numbness/tingling.  Getting out of bed to use restroom, vang pulled.     Vital Signs Last 24 Hrs  T(C): 36.4 (13 Sep 2020 02:32), Max: 37.1 (12 Sep 2020 18:25)  T(F): 97.5 (13 Sep 2020 02:32), Max: 98.8 (12 Sep 2020 18:25)  HR: 102 (13 Sep 2020 02:32) (68 - 119)  BP: 127/89 (13 Sep 2020 02:32) (123/83 - 145/91)  BP(mean): 91 (12 Sep 2020 19:45) (81 - 99)  RR: 18 (13 Sep 2020 02:32) (9 - 23)  SpO2: 98% (13 Sep 2020 02:32) (92% - 99%)    Physical exam:  Gen: NAD, resting in bed  Back: Dressing Clean/Dry/Intact,  HV intact with SS output  LLE: L3-S1 motor 5/5                     SILT L2-S1                     foot wwp, cap refill <2s  RLE: L3-S1 motor 5/5                    SILT L2-S1                     foot wwp, cap refill <2s    Labs:                          13.4   26.58 )-----------( 455      ( 12 Sep 2020 18:30 )             40.3       09-12    131<L>  |  95<L>  |  21  ----------------------------<  104<H>  5.0   |  21<L>  |  0.56    Ca    8.6      12 Sep 2020 18:30        Assessment: 25y Female s/p L4-5 lumbar laminectomy/discectomy and instrumented PSF with TLIF, POD #0    Plan:  - Appreciate pain mgmt recs, will need pain meds adjusted  - dilaudid breakthrough medication given for now  - Continue HV drain, monitor output  - WBAT  - PT/OOB  - DVT ppx- venodynes  - Incentive spirometer

## 2020-09-14 LAB
ANION GAP SERPL CALC-SCNC: 16 MMO/L — HIGH (ref 7–14)
BASOPHILS # BLD AUTO: 0.08 K/UL — SIGNIFICANT CHANGE UP (ref 0–0.2)
BASOPHILS NFR BLD AUTO: 0.4 % — SIGNIFICANT CHANGE UP (ref 0–2)
BUN SERPL-MCNC: 16 MG/DL — SIGNIFICANT CHANGE UP (ref 7–23)
CALCIUM SERPL-MCNC: 8.6 MG/DL — SIGNIFICANT CHANGE UP (ref 8.4–10.5)
CHLORIDE SERPL-SCNC: 97 MMOL/L — LOW (ref 98–107)
CO2 SERPL-SCNC: 19 MMOL/L — LOW (ref 22–31)
CREAT SERPL-MCNC: 0.61 MG/DL — SIGNIFICANT CHANGE UP (ref 0.5–1.3)
EOSINOPHIL # BLD AUTO: 0.12 K/UL — SIGNIFICANT CHANGE UP (ref 0–0.5)
EOSINOPHIL NFR BLD AUTO: 0.5 % — SIGNIFICANT CHANGE UP (ref 0–6)
GLUCOSE SERPL-MCNC: 103 MG/DL — HIGH (ref 70–99)
HCT VFR BLD CALC: 32.3 % — LOW (ref 34.5–45)
HGB BLD-MCNC: 10.4 G/DL — LOW (ref 11.5–15.5)
IMM GRANULOCYTES NFR BLD AUTO: 3.7 % — HIGH (ref 0–1.5)
LYMPHOCYTES # BLD AUTO: 17.1 % — SIGNIFICANT CHANGE UP (ref 13–44)
LYMPHOCYTES # BLD AUTO: 3.84 K/UL — HIGH (ref 1–3.3)
MANUAL SMEAR VERIFICATION: SIGNIFICANT CHANGE UP
MCHC RBC-ENTMCNC: 30.5 PG — SIGNIFICANT CHANGE UP (ref 27–34)
MCHC RBC-ENTMCNC: 32.2 % — SIGNIFICANT CHANGE UP (ref 32–36)
MCV RBC AUTO: 94.7 FL — SIGNIFICANT CHANGE UP (ref 80–100)
MONOCYTES # BLD AUTO: 2.12 K/UL — HIGH (ref 0–0.9)
MONOCYTES NFR BLD AUTO: 9.4 % — SIGNIFICANT CHANGE UP (ref 2–14)
NEUTROPHILS # BLD AUTO: 15.51 K/UL — HIGH (ref 1.8–7.4)
NEUTROPHILS NFR BLD AUTO: 68.9 % — SIGNIFICANT CHANGE UP (ref 43–77)
NRBC # FLD: 0 K/UL — SIGNIFICANT CHANGE UP (ref 0–0)
PLATELET # BLD AUTO: 359 K/UL — SIGNIFICANT CHANGE UP (ref 150–400)
PMV BLD: 10.2 FL — SIGNIFICANT CHANGE UP (ref 7–13)
POTASSIUM SERPL-MCNC: 4 MMOL/L — SIGNIFICANT CHANGE UP (ref 3.5–5.3)
POTASSIUM SERPL-SCNC: 4 MMOL/L — SIGNIFICANT CHANGE UP (ref 3.5–5.3)
RBC # BLD: 3.41 M/UL — LOW (ref 3.8–5.2)
RBC # FLD: 13.2 % — SIGNIFICANT CHANGE UP (ref 10.3–14.5)
SODIUM SERPL-SCNC: 132 MMOL/L — LOW (ref 135–145)
WBC # BLD: 22.5 K/UL — HIGH (ref 3.8–10.5)
WBC # FLD AUTO: 22.5 K/UL — HIGH (ref 3.8–10.5)

## 2020-09-14 PROCEDURE — 93010 ELECTROCARDIOGRAM REPORT: CPT

## 2020-09-14 PROCEDURE — 99232 SBSQ HOSP IP/OBS MODERATE 35: CPT

## 2020-09-14 PROCEDURE — 99233 SBSQ HOSP IP/OBS HIGH 50: CPT

## 2020-09-14 RX ORDER — DIAZEPAM 5 MG
10 TABLET ORAL EVERY 8 HOURS
Refills: 0 | Status: DISCONTINUED | OUTPATIENT
Start: 2020-09-14 | End: 2020-09-16

## 2020-09-14 RX ORDER — HYDROMORPHONE HYDROCHLORIDE 2 MG/ML
1 INJECTION INTRAMUSCULAR; INTRAVENOUS; SUBCUTANEOUS
Refills: 0 | Status: DISCONTINUED | OUTPATIENT
Start: 2020-09-14 | End: 2020-09-14

## 2020-09-14 RX ORDER — DIAZEPAM 5 MG
5 TABLET ORAL EVERY 6 HOURS
Refills: 0 | Status: DISCONTINUED | OUTPATIENT
Start: 2020-09-14 | End: 2020-09-14

## 2020-09-14 RX ORDER — OXYCODONE HYDROCHLORIDE 5 MG/1
5 TABLET ORAL
Refills: 0 | Status: DISCONTINUED | OUTPATIENT
Start: 2020-09-14 | End: 2020-09-15

## 2020-09-14 RX ORDER — ACETAMINOPHEN 500 MG
975 TABLET ORAL EVERY 6 HOURS
Refills: 0 | Status: COMPLETED | OUTPATIENT
Start: 2020-09-14 | End: 2020-09-16

## 2020-09-14 RX ORDER — DIAZEPAM 5 MG
10 TABLET ORAL EVERY 6 HOURS
Refills: 0 | Status: DISCONTINUED | OUTPATIENT
Start: 2020-09-14 | End: 2020-09-14

## 2020-09-14 RX ORDER — HYDROMORPHONE HYDROCHLORIDE 2 MG/ML
1 INJECTION INTRAMUSCULAR; INTRAVENOUS; SUBCUTANEOUS
Refills: 0 | Status: DISCONTINUED | OUTPATIENT
Start: 2020-09-14 | End: 2020-09-15

## 2020-09-14 RX ORDER — HYDROXYZINE HCL 10 MG
25 TABLET ORAL EVERY 8 HOURS
Refills: 0 | Status: DISCONTINUED | OUTPATIENT
Start: 2020-09-14 | End: 2020-09-14

## 2020-09-14 RX ORDER — OXYCODONE HYDROCHLORIDE 5 MG/1
10 TABLET ORAL
Refills: 0 | Status: DISCONTINUED | OUTPATIENT
Start: 2020-09-14 | End: 2020-09-15

## 2020-09-14 RX ADMIN — OXYCODONE HYDROCHLORIDE 10 MILLIGRAM(S): 5 TABLET ORAL at 16:08

## 2020-09-14 RX ADMIN — OXYCODONE HYDROCHLORIDE 10 MILLIGRAM(S): 5 TABLET ORAL at 16:40

## 2020-09-14 RX ADMIN — HYDROMORPHONE HYDROCHLORIDE 1 MILLIGRAM(S): 2 INJECTION INTRAMUSCULAR; INTRAVENOUS; SUBCUTANEOUS at 17:35

## 2020-09-14 RX ADMIN — HYDROMORPHONE HYDROCHLORIDE 1 MILLIGRAM(S): 2 INJECTION INTRAMUSCULAR; INTRAVENOUS; SUBCUTANEOUS at 13:32

## 2020-09-14 RX ADMIN — OXYCODONE HYDROCHLORIDE 10 MILLIGRAM(S): 5 TABLET ORAL at 19:13

## 2020-09-14 RX ADMIN — Medication 975 MILLIGRAM(S): at 09:26

## 2020-09-14 RX ADMIN — MAGNESIUM HYDROXIDE 30 MILLILITER(S): 400 TABLET, CHEWABLE ORAL at 15:02

## 2020-09-14 RX ADMIN — Medication 5 MILLIGRAM(S): at 07:44

## 2020-09-14 RX ADMIN — OXYCODONE HYDROCHLORIDE 10 MILLIGRAM(S): 5 TABLET ORAL at 10:00

## 2020-09-14 RX ADMIN — Medication 975 MILLIGRAM(S): at 15:02

## 2020-09-14 RX ADMIN — OXYCODONE HYDROCHLORIDE 10 MILLIGRAM(S): 5 TABLET ORAL at 22:10

## 2020-09-14 RX ADMIN — HYDROMORPHONE HYDROCHLORIDE 1 MILLIGRAM(S): 2 INJECTION INTRAMUSCULAR; INTRAVENOUS; SUBCUTANEOUS at 20:28

## 2020-09-14 RX ADMIN — HYDROMORPHONE HYDROCHLORIDE 1 MILLIGRAM(S): 2 INJECTION INTRAMUSCULAR; INTRAVENOUS; SUBCUTANEOUS at 20:48

## 2020-09-14 RX ADMIN — OXYCODONE HYDROCHLORIDE 10 MILLIGRAM(S): 5 TABLET ORAL at 09:25

## 2020-09-14 RX ADMIN — HYDROMORPHONE HYDROCHLORIDE 0.5 MILLIGRAM(S): 2 INJECTION INTRAMUSCULAR; INTRAVENOUS; SUBCUTANEOUS at 06:40

## 2020-09-14 RX ADMIN — HYDROMORPHONE HYDROCHLORIDE 0.5 MILLIGRAM(S): 2 INJECTION INTRAMUSCULAR; INTRAVENOUS; SUBCUTANEOUS at 00:46

## 2020-09-14 RX ADMIN — HYDROMORPHONE HYDROCHLORIDE 1 MILLIGRAM(S): 2 INJECTION INTRAMUSCULAR; INTRAVENOUS; SUBCUTANEOUS at 10:40

## 2020-09-14 RX ADMIN — OXYCODONE HYDROCHLORIDE 10 MILLIGRAM(S): 5 TABLET ORAL at 12:26

## 2020-09-14 RX ADMIN — OXYCODONE HYDROCHLORIDE 10 MILLIGRAM(S): 5 TABLET ORAL at 19:45

## 2020-09-14 RX ADMIN — HYDROMORPHONE HYDROCHLORIDE 1 MILLIGRAM(S): 2 INJECTION INTRAMUSCULAR; INTRAVENOUS; SUBCUTANEOUS at 13:50

## 2020-09-14 RX ADMIN — PANTOPRAZOLE SODIUM 40 MILLIGRAM(S): 20 TABLET, DELAYED RELEASE ORAL at 06:37

## 2020-09-14 RX ADMIN — Medication 10 MILLIGRAM(S): at 15:02

## 2020-09-14 RX ADMIN — Medication 975 MILLIGRAM(S): at 22:06

## 2020-09-14 RX ADMIN — OXYCODONE HYDROCHLORIDE 10 MILLIGRAM(S): 5 TABLET ORAL at 22:40

## 2020-09-14 RX ADMIN — HYDROMORPHONE HYDROCHLORIDE 1 MILLIGRAM(S): 2 INJECTION INTRAMUSCULAR; INTRAVENOUS; SUBCUTANEOUS at 10:25

## 2020-09-14 RX ADMIN — SENNA PLUS 2 TABLET(S): 8.6 TABLET ORAL at 21:46

## 2020-09-14 RX ADMIN — HYDROMORPHONE HYDROCHLORIDE 30 MILLILITER(S): 2 INJECTION INTRAMUSCULAR; INTRAVENOUS; SUBCUTANEOUS at 08:28

## 2020-09-14 RX ADMIN — SODIUM CHLORIDE 125 MILLILITER(S): 9 INJECTION INTRAMUSCULAR; INTRAVENOUS; SUBCUTANEOUS at 10:06

## 2020-09-14 RX ADMIN — HYDROMORPHONE HYDROCHLORIDE 1 MILLIGRAM(S): 2 INJECTION INTRAMUSCULAR; INTRAVENOUS; SUBCUTANEOUS at 17:18

## 2020-09-14 RX ADMIN — OXYCODONE HYDROCHLORIDE 10 MILLIGRAM(S): 5 TABLET ORAL at 13:00

## 2020-09-14 RX ADMIN — Medication 975 MILLIGRAM(S): at 21:44

## 2020-09-14 NOTE — PROGRESS NOTE BEHAVIORAL HEALTH - NSBHCHARTREVIEWLAB_PSY_A_CORE FT
10.4   22.50 )-----------( 359      ( 14 Sep 2020 12:39 )             32.3     09-14    132<L>  |  97<L>  |  16  ----------------------------<  103<H>  4.0   |  19<L>  |  0.61    Ca    8.6      14 Sep 2020 06:32

## 2020-09-14 NOTE — PROGRESS NOTE ADULT - SUBJECTIVE AND OBJECTIVE BOX
Anesthesia Pain Management Service    SUBJECTIVE: Patient is still in pain despite being on the IV PCA with Oxycontin 20 mg BID.  Patient states that her doctor said she needs to go to the ICU for better pain control, and that the team will ask if she can go up on the IV PCA.    Pain Scale Score	At rest: __8/10_ 	With Activity: ___ 	[X ] Refer to charted pain scores    THERAPY:    [ ] IV PCA Morphine		[ ] 5 mg/mL	[ ] 1 mg/mL  [X ] IV PCA Hydromorphone	[ ] 5 mg/mL	[X ] 1 mg/mL  [ ] IV PCA Fentanyl		[ ] 50 micrograms/mL    Demand dose __0.2_ lockout __6_ (minutes) Continuous Rate _0__ Total: _13__   mg used (in past 24 hrs)      MEDICATIONS  (STANDING):  acetaminophen   Tablet .. 975 milliGRAM(s) Oral every 6 hours  gabapentin 400 milliGRAM(s) Oral every 8 hours  nicotine  Polacrilex Gum 4 milliGRAM(s) Oral every 4 hours  oxyCODONE  ER Tablet 20 milliGRAM(s) Oral every 12 hours  pantoprazole    Tablet 40 milliGRAM(s) Oral before breakfast  polyethylene glycol 3350 17 Gram(s) Oral daily  senna 2 Tablet(s) Oral at bedtime  sodium chloride 0.9%. 1000 milliLiter(s) (125 mL/Hr) IV Continuous <Continuous>    MEDICATIONS  (PRN):  diazepam    Tablet 5 milliGRAM(s) Oral every 6 hours PRN muscle spasm  diphenhydrAMINE 25 milliGRAM(s) Oral every 4 hours PRN Rash and/or Itching  HYDROmorphone  Injectable 1 milliGRAM(s) IV Push every 3 hours PRN Severe breakthrough Pain (7 - 10)  magnesium hydroxide Suspension 30 milliLiter(s) Oral daily PRN Constipation  naloxone Injectable 0.1 milliGRAM(s) IV Push every 3 minutes PRN For ANY of the following changes in patient status:  A. RR LESS THAN 10 breaths per minute, B. Oxygen saturation LESS THAN 90%, C. Sedation score of 6  ondansetron Injectable 4 milliGRAM(s) IV Push every 6 hours PRN Nausea  oxyCODONE    IR 5 milliGRAM(s) Oral every 3 hours PRN Moderate Pain (4 - 6)  oxyCODONE    IR 10 milliGRAM(s) Oral every 3 hours PRN Severe Pain (7 - 10)      OBJECTIVE:  Patient is sitting up in bed, weepy.    Sedation Score:	[ X] Alert	[ ] Drowsy 	[ ] Arousable	[ ] Asleep	[ ] Unresponsive    Side Effects:	[X ] None	[ ] Nausea	[ ] Vomiting	[ ] Pruritus  		[ ] Other:    Vital Signs Last 24 Hrs  T(C): 36.7 (14 Sep 2020 05:09), Max: 36.8 (13 Sep 2020 14:26)  T(F): 98 (14 Sep 2020 05:09), Max: 98.3 (14 Sep 2020 00:45)  HR: 112 (14 Sep 2020 06:43) (91 - 122)  BP: 118/65 (14 Sep 2020 06:43) (118/65 - 147/93)  BP(mean): --  RR: 16 (14 Sep 2020 06:43) (16 - 19)  SpO2: 95% (14 Sep 2020 06:43) (95% - 98%)    ASSESSMENT/ PLAN    Therapy to  be:	[ ] Continue   [ X] Discontinued   [X ] Change to prn Analgesics    Documentation and Verification of current medications:   [X] Done	[ ] Not done, not elligible    Comments: Discussed patient with team, recommend follow up with Psych as per patient request.  PRN Oral/IV opioids and/or Adjuvant non-opioid medication to be ordered at this point.  IV Dilaudid 1 mg every 3 hours only written for 4 doses as per team request, to see if patient needs to go up on oral pain medications.

## 2020-09-14 NOTE — PROGRESS NOTE BEHAVIORAL HEALTH - NSBHCONSULTMEDS_PSY_A_CORE FT
- agree with pain mgmt rec to increase frequency but not dosage of diazepam 5mg to q6h, can use for anxiety   - breakthrough anxiety or insomnia, can use Atarax 25mg q8h PRN if patient wants it  -  Holistic RN consult

## 2020-09-14 NOTE — PROGRESS NOTE ADULT - PROBLEM SELECTOR PLAN 5
per primary team -patient requesting to speak with psychiatry  -psychiatry following, follow up recs

## 2020-09-14 NOTE — PROGRESS NOTE BEHAVIORAL HEALTH - NSBHCHARTREVIEWVS_PSY_A_CORE FT
Vital Signs Last 24 Hrs  T(C): 36.9 (14 Sep 2020 13:31), Max: 38.2 (14 Sep 2020 09:18)  T(F): 98.4 (14 Sep 2020 13:31), Max: 100.7 (14 Sep 2020 09:18)  HR: 118 (14 Sep 2020 13:31) (96 - 122)  BP: 129/71 (14 Sep 2020 13:31) (118/65 - 136/84)  BP(mean): --  RR: 17 (14 Sep 2020 13:31) (16 - 18)  SpO2: 98% (14 Sep 2020 13:31) (95% - 98%)

## 2020-09-14 NOTE — PROGRESS NOTE BEHAVIORAL HEALTH - NSBHFUPINTERVALHXFT_PSY_A_CORE
Patient states her "anxiety is through the roof", has not been sleeping well due to pain and frustrations of being in the hospital, denies AH/VH, denies SI/HI. Offered meds as PRN for insomnia though patient states she would like to avoid it, agrees to ask if she wants them. OK with increase in frequency of diazepam which may help with anxiety.

## 2020-09-14 NOTE — PROGRESS NOTE ADULT - SUBJECTIVE AND OBJECTIVE BOX
Orthopaedic Surgery Progress Note    Subjective:   Patient seen and examined  No acute events overnight  PT complains of back pain, improving at rest but worse with any sort of movement.       Objective:  T(C): 36.7 (09-14-20 @ 05:09), Max: 36.9 (09-13-20 @ 06:41)  HR: 112 (09-14-20 @ 05:09) (91 - 122)  BP: 126/82 (09-14-20 @ 05:09) (121/73 - 147/93)  RR: 16 (09-14-20 @ 05:09) (16 - 19)  SpO2: 98% (09-14-20 @ 05:09) (95% - 98%)  Wt(kg): --    09-12 @ 07:01  -  09-13 @ 07:00  --------------------------------------------------------  IN: 375 mL / OUT: 1825 mL / NET: -1450 mL    09-13 @ 07:01  -  09-14 @ 06:27  --------------------------------------------------------  IN: 0 mL / OUT: 882 mL / NET: -882 mL        PE    GEN: NAD  Back:   dressing C/D/I  HV in place w/ serosanguinous output  Neuro:  RLE GS 5/5 TA 5/5 EHL 5/5 FHL 5/5  SILT L2-S1  LLEGS 5/5 TA 5/5 EHL 5/5 FHL 5/5  SILT L2-S1  WWP BLE                          13.3   29.32 )-----------( 554      ( 13 Sep 2020 06:56 )             39.9     09-13    134<L>  |  96<L>  |  24<H>  ----------------------------<  136<H>  3.8   |  21<L>  |  0.54    Ca    8.9      13 Sep 2020 06:56            25y Female s/p L 4-5 lami, disc, PSF, interbody POD#2      - Pain control, PCA pump, pt with chronic pain pre-op  - FU labs  - WBAT  - PT/OT/OOB  - I/S  - Monitor HV output  - SCDs

## 2020-09-14 NOTE — PROGRESS NOTE BEHAVIORAL HEALTH - SUMMARY
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    Initial eval: Patient endorsing some anxiety chronically which has worsened with her worsening back pain. Patient is future-oriented, hopeful that her back pain will improve with surgery. Patient denies SI, denies depressive symptoms, denies psychotic symptoms. Patient is cooperative, with good insight and judgment. At this time patient declining beginning an SSRI or other medication to take daily, but states she is interested in a referral for an outpatient psychiatrist. As already being prescribed Valium for back spasms PRN while in the hospital, would not recommend another PRN benzodiazepine at this time.    9/14 - patient cooperative but anxious, states primary complaint is pain and anxiety related to this, OK to increase diazepam to q6h as recommended by pain mgmt, also would offer atarax for anxiety/insomnia as below

## 2020-09-14 NOTE — PROGRESS NOTE ADULT - SUBJECTIVE AND OBJECTIVE BOX
[facility] Division of Hospital Medicine  Teressa Bui MD  Pager (M-F, 8A-5P): [phone]  Other Times:  [phone]    CHIEF COMPLAINT: f/u     SUBJECTIVE / OVERNIGHT EVENTS: Patient seen and examined. Complaining of back pain since surgery. Leg pain has improved.     MEDICATIONS  (STANDING):  acetaminophen   Tablet .. 975 milliGRAM(s) Oral every 6 hours  BACItracin   Ointment 1 Application(s) Topical daily  gabapentin 400 milliGRAM(s) Oral every 8 hours  nicotine  Polacrilex Gum 4 milliGRAM(s) Oral every 4 hours  oxyCODONE  ER Tablet 20 milliGRAM(s) Oral every 12 hours  pantoprazole    Tablet 40 milliGRAM(s) Oral before breakfast  polyethylene glycol 3350 17 Gram(s) Oral daily  senna 2 Tablet(s) Oral at bedtime  sodium chloride 0.9%. 1000 milliLiter(s) (125 mL/Hr) IV Continuous <Continuous>    MEDICATIONS  (PRN):  diazepam    Tablet 5 milliGRAM(s) Oral every 6 hours PRN muscle spasm  diphenhydrAMINE 25 milliGRAM(s) Oral every 4 hours PRN Rash and/or Itching  HYDROmorphone  Injectable 1 milliGRAM(s) IV Push every 3 hours PRN Severe breakthrough Pain (7 - 10)  magnesium hydroxide Suspension 30 milliLiter(s) Oral daily PRN Constipation  naloxone Injectable 0.1 milliGRAM(s) IV Push every 3 minutes PRN For ANY of the following changes in patient status:  A. RR LESS THAN 10 breaths per minute, B. Oxygen saturation LESS THAN 90%, C. Sedation score of 6  ondansetron Injectable 4 milliGRAM(s) IV Push every 6 hours PRN Nausea  oxyCODONE    IR 5 milliGRAM(s) Oral every 3 hours PRN Moderate Pain (4 - 6)  oxyCODONE    IR 10 milliGRAM(s) Oral every 3 hours PRN Severe Pain (7 - 10)      VITALS:  T(F): 98.4 (09-14-20 @ 13:31), Max: 100.7 (09-14-20 @ 09:18)  HR: 118 (09-14-20 @ 13:31) (96 - 122)  BP: 129/71 (09-14-20 @ 13:31) (118/65 - 136/84)  RR: 17 (09-14-20 @ 13:31) (16 - 18)  SpO2: 98% (09-14-20 @ 13:31)  Wt(kg): --      CAPILLARY BLOOD GLUCOSE    PHYSICAL EXAM:  GENERAL: NAD, well-developed  HEAD:  Atraumatic, Normocephalic  EYES: EOMI, PERRLA, conjunctiva and sclera clear  NECK: Supple, No JVD  CHEST/LUNG: Clear to auscultation bilaterally; No wheeze  HEART: Regular rate and rhythm; No murmurs, rubs, or gallops  ABDOMEN: Soft, Nontender, Nondistended; Bowel sounds present  EXTREMITIES:  2+ Peripheral Pulses, No clubbing, cyanosis, or edema  PSYCH: AAOx3  NEUROLOGY: non-focal  SKIN: No rashes or lesions    LABS:              10.4                 x    | x    | x            22.50 >-----------< 359     ------------------------< x                     32.3                 x    | x    | x                                            Ca x     Mg x     Ph x                      RADIOLOGY & ADDITIONAL TESTS:  Imaging Personally Reviewed: [x] Yes    [ ] Consultant(s) Notes Reviewed:  [x] Care Discussed with Consultants/Other Providers: Orthopedic PA - discussed

## 2020-09-14 NOTE — PROGRESS NOTE BEHAVIORAL HEALTH - NSBHCONSULTFOLLOWAFTERCARE_PSY_A_CORE FT
Please provide patient with phone number for adult outpatient clinic at [facility] for routine outpatient follow up: [phone]

## 2020-09-14 NOTE — PROGRESS NOTE BEHAVIORAL HEALTH - NSBHCONSULTRECOMMENDOTHER_PSY_A_CORE FT
At this time patient declining beginning an SSRI or other medication to take daily, but states she is interested in a referral for an outpatient psychiatrist. As already being prescribed Valium for back spasms PRN while in the hospital, would not recommend another PRN benzodiazepine at this time.

## 2020-09-15 LAB
ANION GAP SERPL CALC-SCNC: 10 MMO/L — SIGNIFICANT CHANGE UP (ref 7–14)
BASOPHILS # BLD AUTO: 0.04 K/UL — SIGNIFICANT CHANGE UP (ref 0–0.2)
BASOPHILS NFR BLD AUTO: 0.2 % — SIGNIFICANT CHANGE UP (ref 0–2)
BUN SERPL-MCNC: 10 MG/DL — SIGNIFICANT CHANGE UP (ref 7–23)
CALCIUM SERPL-MCNC: 8.8 MG/DL — SIGNIFICANT CHANGE UP (ref 8.4–10.5)
CHLORIDE SERPL-SCNC: 97 MMOL/L — LOW (ref 98–107)
CO2 SERPL-SCNC: 28 MMOL/L — SIGNIFICANT CHANGE UP (ref 22–31)
CREAT SERPL-MCNC: 0.51 MG/DL — SIGNIFICANT CHANGE UP (ref 0.5–1.3)
EOSINOPHIL # BLD AUTO: 0.17 K/UL — SIGNIFICANT CHANGE UP (ref 0–0.5)
EOSINOPHIL NFR BLD AUTO: 0.9 % — SIGNIFICANT CHANGE UP (ref 0–6)
GLUCOSE SERPL-MCNC: 122 MG/DL — HIGH (ref 70–99)
HCT VFR BLD CALC: 29 % — LOW (ref 34.5–45)
HCT VFR BLD CALC: 29 % — LOW (ref 34.5–45)
HGB BLD-MCNC: 9.5 G/DL — LOW (ref 11.5–15.5)
HGB BLD-MCNC: 9.5 G/DL — LOW (ref 11.5–15.5)
IMM GRANULOCYTES NFR BLD AUTO: 3.5 % — HIGH (ref 0–1.5)
LYMPHOCYTES # BLD AUTO: 15.4 % — SIGNIFICANT CHANGE UP (ref 13–44)
LYMPHOCYTES # BLD AUTO: 2.8 K/UL — SIGNIFICANT CHANGE UP (ref 1–3.3)
MCHC RBC-ENTMCNC: 31.3 PG — SIGNIFICANT CHANGE UP (ref 27–34)
MCHC RBC-ENTMCNC: 31.3 PG — SIGNIFICANT CHANGE UP (ref 27–34)
MCHC RBC-ENTMCNC: 32.8 % — SIGNIFICANT CHANGE UP (ref 32–36)
MCHC RBC-ENTMCNC: 32.8 % — SIGNIFICANT CHANGE UP (ref 32–36)
MCV RBC AUTO: 95.4 FL — SIGNIFICANT CHANGE UP (ref 80–100)
MCV RBC AUTO: 95.4 FL — SIGNIFICANT CHANGE UP (ref 80–100)
MONOCYTES # BLD AUTO: 1.14 K/UL — HIGH (ref 0–0.9)
MONOCYTES NFR BLD AUTO: 6.3 % — SIGNIFICANT CHANGE UP (ref 2–14)
NEUTROPHILS # BLD AUTO: 13.43 K/UL — HIGH (ref 1.8–7.4)
NEUTROPHILS NFR BLD AUTO: 73.7 % — SIGNIFICANT CHANGE UP (ref 43–77)
NRBC # FLD: 0 K/UL — SIGNIFICANT CHANGE UP (ref 0–0)
NRBC # FLD: 0 K/UL — SIGNIFICANT CHANGE UP (ref 0–0)
PLATELET # BLD AUTO: 326 K/UL — SIGNIFICANT CHANGE UP (ref 150–400)
PLATELET # BLD AUTO: 326 K/UL — SIGNIFICANT CHANGE UP (ref 150–400)
PMV BLD: 9.9 FL — SIGNIFICANT CHANGE UP (ref 7–13)
PMV BLD: 9.9 FL — SIGNIFICANT CHANGE UP (ref 7–13)
POTASSIUM SERPL-MCNC: 3.9 MMOL/L — SIGNIFICANT CHANGE UP (ref 3.5–5.3)
POTASSIUM SERPL-SCNC: 3.9 MMOL/L — SIGNIFICANT CHANGE UP (ref 3.5–5.3)
RBC # BLD: 3.04 M/UL — LOW (ref 3.8–5.2)
RBC # BLD: 3.04 M/UL — LOW (ref 3.8–5.2)
RBC # FLD: 13.1 % — SIGNIFICANT CHANGE UP (ref 10.3–14.5)
RBC # FLD: 13.1 % — SIGNIFICANT CHANGE UP (ref 10.3–14.5)
SODIUM SERPL-SCNC: 135 MMOL/L — SIGNIFICANT CHANGE UP (ref 135–145)
WBC # BLD: 18.22 K/UL — HIGH (ref 3.8–10.5)
WBC # BLD: 18.22 K/UL — HIGH (ref 3.8–10.5)
WBC # FLD AUTO: 18.22 K/UL — HIGH (ref 3.8–10.5)
WBC # FLD AUTO: 18.22 K/UL — HIGH (ref 3.8–10.5)

## 2020-09-15 PROCEDURE — 99233 SBSQ HOSP IP/OBS HIGH 50: CPT

## 2020-09-15 RX ORDER — HYDROMORPHONE HYDROCHLORIDE 2 MG/ML
2 INJECTION INTRAMUSCULAR; INTRAVENOUS; SUBCUTANEOUS
Refills: 0 | Status: DISCONTINUED | OUTPATIENT
Start: 2020-09-15 | End: 2020-09-16

## 2020-09-15 RX ORDER — HYDROMORPHONE HYDROCHLORIDE 2 MG/ML
1 INJECTION INTRAMUSCULAR; INTRAVENOUS; SUBCUTANEOUS
Refills: 0 | Status: DISCONTINUED | OUTPATIENT
Start: 2020-09-15 | End: 2020-09-16

## 2020-09-15 RX ORDER — HYDROMORPHONE HYDROCHLORIDE 2 MG/ML
4 INJECTION INTRAMUSCULAR; INTRAVENOUS; SUBCUTANEOUS
Refills: 0 | Status: DISCONTINUED | OUTPATIENT
Start: 2020-09-15 | End: 2020-09-16

## 2020-09-15 RX ADMIN — Medication 975 MILLIGRAM(S): at 10:04

## 2020-09-15 RX ADMIN — HYDROMORPHONE HYDROCHLORIDE 1 MILLIGRAM(S): 2 INJECTION INTRAMUSCULAR; INTRAVENOUS; SUBCUTANEOUS at 05:02

## 2020-09-15 RX ADMIN — Medication 975 MILLIGRAM(S): at 03:30

## 2020-09-15 RX ADMIN — OXYCODONE HYDROCHLORIDE 10 MILLIGRAM(S): 5 TABLET ORAL at 10:40

## 2020-09-15 RX ADMIN — HYDROMORPHONE HYDROCHLORIDE 1 MILLIGRAM(S): 2 INJECTION INTRAMUSCULAR; INTRAVENOUS; SUBCUTANEOUS at 21:30

## 2020-09-15 RX ADMIN — HYDROMORPHONE HYDROCHLORIDE 1 MILLIGRAM(S): 2 INJECTION INTRAMUSCULAR; INTRAVENOUS; SUBCUTANEOUS at 14:12

## 2020-09-15 RX ADMIN — OXYCODONE HYDROCHLORIDE 10 MILLIGRAM(S): 5 TABLET ORAL at 03:04

## 2020-09-15 RX ADMIN — Medication 975 MILLIGRAM(S): at 15:15

## 2020-09-15 RX ADMIN — HYDROMORPHONE HYDROCHLORIDE 1 MILLIGRAM(S): 2 INJECTION INTRAMUSCULAR; INTRAVENOUS; SUBCUTANEOUS at 05:15

## 2020-09-15 RX ADMIN — HYDROMORPHONE HYDROCHLORIDE 4 MILLIGRAM(S): 2 INJECTION INTRAMUSCULAR; INTRAVENOUS; SUBCUTANEOUS at 17:00

## 2020-09-15 RX ADMIN — Medication 10 MILLIGRAM(S): at 15:15

## 2020-09-15 RX ADMIN — HYDROMORPHONE HYDROCHLORIDE 1 MILLIGRAM(S): 2 INJECTION INTRAMUSCULAR; INTRAVENOUS; SUBCUTANEOUS at 18:15

## 2020-09-15 RX ADMIN — HYDROMORPHONE HYDROCHLORIDE 4 MILLIGRAM(S): 2 INJECTION INTRAMUSCULAR; INTRAVENOUS; SUBCUTANEOUS at 16:23

## 2020-09-15 RX ADMIN — HYDROMORPHONE HYDROCHLORIDE 4 MILLIGRAM(S): 2 INJECTION INTRAMUSCULAR; INTRAVENOUS; SUBCUTANEOUS at 13:35

## 2020-09-15 RX ADMIN — HYDROMORPHONE HYDROCHLORIDE 1 MILLIGRAM(S): 2 INJECTION INTRAMUSCULAR; INTRAVENOUS; SUBCUTANEOUS at 17:54

## 2020-09-15 RX ADMIN — Medication 10 MILLIGRAM(S): at 07:39

## 2020-09-15 RX ADMIN — HYDROMORPHONE HYDROCHLORIDE 1 MILLIGRAM(S): 2 INJECTION INTRAMUSCULAR; INTRAVENOUS; SUBCUTANEOUS at 11:20

## 2020-09-15 RX ADMIN — Medication 10 MILLIGRAM(S): at 23:11

## 2020-09-15 RX ADMIN — HYDROMORPHONE HYDROCHLORIDE 1 MILLIGRAM(S): 2 INJECTION INTRAMUSCULAR; INTRAVENOUS; SUBCUTANEOUS at 00:26

## 2020-09-15 RX ADMIN — OXYCODONE HYDROCHLORIDE 10 MILLIGRAM(S): 5 TABLET ORAL at 03:13

## 2020-09-15 RX ADMIN — Medication 975 MILLIGRAM(S): at 03:03

## 2020-09-15 RX ADMIN — HYDROMORPHONE HYDROCHLORIDE 4 MILLIGRAM(S): 2 INJECTION INTRAMUSCULAR; INTRAVENOUS; SUBCUTANEOUS at 20:45

## 2020-09-15 RX ADMIN — HYDROMORPHONE HYDROCHLORIDE 1 MILLIGRAM(S): 2 INJECTION INTRAMUSCULAR; INTRAVENOUS; SUBCUTANEOUS at 14:30

## 2020-09-15 RX ADMIN — HYDROMORPHONE HYDROCHLORIDE 4 MILLIGRAM(S): 2 INJECTION INTRAMUSCULAR; INTRAVENOUS; SUBCUTANEOUS at 22:46

## 2020-09-15 RX ADMIN — PANTOPRAZOLE SODIUM 40 MILLIGRAM(S): 20 TABLET, DELAYED RELEASE ORAL at 06:48

## 2020-09-15 RX ADMIN — HYDROMORPHONE HYDROCHLORIDE 1 MILLIGRAM(S): 2 INJECTION INTRAMUSCULAR; INTRAVENOUS; SUBCUTANEOUS at 00:16

## 2020-09-15 RX ADMIN — HYDROMORPHONE HYDROCHLORIDE 1 MILLIGRAM(S): 2 INJECTION INTRAMUSCULAR; INTRAVENOUS; SUBCUTANEOUS at 21:07

## 2020-09-15 RX ADMIN — OXYCODONE HYDROCHLORIDE 10 MILLIGRAM(S): 5 TABLET ORAL at 10:04

## 2020-09-15 RX ADMIN — HYDROMORPHONE HYDROCHLORIDE 1 MILLIGRAM(S): 2 INJECTION INTRAMUSCULAR; INTRAVENOUS; SUBCUTANEOUS at 11:03

## 2020-09-15 RX ADMIN — HYDROMORPHONE HYDROCHLORIDE 4 MILLIGRAM(S): 2 INJECTION INTRAMUSCULAR; INTRAVENOUS; SUBCUTANEOUS at 13:03

## 2020-09-15 RX ADMIN — OXYCODONE HYDROCHLORIDE 10 MILLIGRAM(S): 5 TABLET ORAL at 07:07

## 2020-09-15 RX ADMIN — SENNA PLUS 2 TABLET(S): 8.6 TABLET ORAL at 22:31

## 2020-09-15 RX ADMIN — OXYCODONE HYDROCHLORIDE 10 MILLIGRAM(S): 5 TABLET ORAL at 06:48

## 2020-09-15 RX ADMIN — HYDROMORPHONE HYDROCHLORIDE 4 MILLIGRAM(S): 2 INJECTION INTRAMUSCULAR; INTRAVENOUS; SUBCUTANEOUS at 19:45

## 2020-09-15 RX ADMIN — Medication 975 MILLIGRAM(S): at 21:07

## 2020-09-15 NOTE — PROGRESS NOTE ADULT - SUBJECTIVE AND OBJECTIVE BOX
Orthopaedic Surgery Progress Note    Subjective:   Patient seen and examined  No acute events overnight  Ambulating, pending stairs. Pain is tolerable. Dressing changed, hemovac removed.     Objective:  T(C): 37.2 (09-15-20 @ 04:52), Max: 38.2 (09-14-20 @ 09:18)  HR: 122 (09-15-20 @ 04:52) (105 - 126)  BP: 135/76 (09-15-20 @ 04:52) (101/57 - 138/82)  RR: 17 (09-15-20 @ 04:52) (15 - 17)  SpO2: 95% (09-15-20 @ 04:52) (95% - 99%)  Wt(kg): --    09-13 @ 07:01  -  09-14 @ 07:00  --------------------------------------------------------  IN: 0 mL / OUT: 882 mL / NET: -882 mL    09-14 @ 07:01  -  09-15 @ 06:37  --------------------------------------------------------  IN: 0 mL / OUT: 35.5 mL / NET: -35.5 mL      PE    NAD  Back:   dressing C/D/I, mild appropriate tonya-incisional ttp  HMV put out 35 over 24 so removed  Neuro:  RLE IP 5/5 HS 5/5 Q 5/5 GS 5/5 TA 5/5 EHL 5/5   SILT L2-S1  LLE IP 5/5 HS 5/5 Q 5/5 GS 5/5 TA 4+/5 EHL 4+/5  SILT L2-S1  WWP BLE                          10.4   22.50 )-----------( 359      ( 14 Sep 2020 12:39 )             32.3     09-14    132<L>  |  97<L>  |  16  ----------------------------<  103<H>  4.0   |  19<L>  |  0.61    Ca    8.6      14 Sep 2020 06:32      25y Female s/p L4-5 lami/dis/PSF POD3  - Pain control - finalize prior to DC  - WBAT  - PT/OT/OOB  - I/S  - SCDs  - Dispo planning - home

## 2020-09-15 NOTE — ASSESSMENT
Problem: Knowledge Deficit  Goal: Knowledge of disease process/condition, treatment plan, diagnostic tests, and medications will improve  Outcome: PROGRESSING AS EXPECTED  Goal: Knowledge of the prescribed therapeutic regimen will improve  Outcome: PROGRESSING AS EXPECTED  Intervention: Discuss information regarding therpeutic regimen and document in education  Note: Pt was educated on POC, MAR, shift routine and nursing education R/T Dx. Questions were encouraged, but pt denied having questions.  Pt verbalized understanding of all teaching.           Problem: Pain Management  Goal: Pain level will decrease to patient's comfort goal  Outcome: MET  Intervention: Follow pain managment plan developed in collaboration with patient and Interdisciplinary Team  Note: Pt was educated on 0-10 pain scale, non-pharm methods of pain relief and MAR. Pt states that their pain is well controlled and declines pain medication other than scheduled medication.       [FreeTextEntry1] : 25 year old white female with acute onset of left leg radiculopathy and chronic right sided lumbar radiculopathy.  A lengthy discussion took place about treatment with medrol dose meri.  She was not in favor of another medrol dose meri since she had no relief in the past.  Due to the acute onset of her pain she will go to the  ED for a CT scan / xrays of the lumbar region to rule out fracture s/p fall.  She will follow up after the ED visit in the office.  Dr. Gibson, [facility] Triage and Neurosurgery PA/resident were made aware of the patient.  \par \par \par CC:\par  Dr. William \par Dr. Alverto Tello

## 2020-09-15 NOTE — PROGRESS NOTE ADULT - SUBJECTIVE AND OBJECTIVE BOX
Follow up on patient who is a 25y old  Female who presents with a chief complaint of s/p surgery (13 Sep 2020 13:48)      HPI:  Patient transferred from [facility].  From [facility]:  Patient is a 25y Female who presented w/ chronic low back and pain down L leg for multiple years. Patient states her symptoms have been getting worse over the last 8 weeks.  Denies any new trauma. Patient has seen Dr. Bray as an outpatient in 2019 for the low back pain. MRI revealed large central L4/5 disc herniation. She was evaluated by neurosurgery who recommended a L4/5 discectomy. However, patient is requesting to have a spinal fusion due to her back pain, and is requesting a second opinion by Dr. Bray. Endorses numbness/tingling/paresthesias/ and weakness of LLE. Denies bowel/bladder incontinence. Denies fevers/chills. No other complaints at this time.    At [facility], patient continues to endorse same complaints / concerns.  No new concerns.  Is aware of an in agreement w/ plan for OR on Saturday w/ Dr. Bray.    MEDICATIONS  (STANDING):  acetaminophen   Tablet .. 975 milliGRAM(s) Oral every 8 hours  dexAMETHasone  Injectable 4 milliGRAM(s) IV Push every 8 hours  diazepam    Tablet 5 milliGRAM(s) Oral at bedtime  diazepam    Tablet 5 milliGRAM(s) Oral <User Schedule>  heparin   Injectable 7500 Unit(s) SubCutaneous every 8 hours  nicotine  Polacrilex Gum 4 milliGRAM(s) Oral every 4 hours  pantoprazole    Tablet 40 milliGRAM(s) Oral before breakfast  senna 2 Tablet(s) Oral at bedtime      Allergies    penicillins (Rash)  sulfa drugs (Rash)    Intolerances        PAST MEDICAL & SURGICAL HISTORY:  GERD (gastroesophageal reflux disease)  History of tonsillectomy  Endometriosis  Low Back Pain                          14.5   16.25 )-----------( 440      ( 09 Sep 2020 12:48 )             42.3         Vital Signs Last 24 Hrs  T(C): 36.6 (09-10-20 @ 20:59), Max: 36.8 (09-10-20 @ 14:06)  T(F): 97.9 (09-10-20 @ 20:59), Max: 98.3 (09-10-20 @ 14:06)  HR: 78 (09-10-20 @ 20:59) (70 - 98)  BP: 131/94 (09-10-20 @ 22:03) (125/70 - 147/103)  BP(mean): --  RR: 16 (09-10-20 @ 20:59) (15 - 17)  SpO2: 95% (09-10-20 @ 20:59) (95% - 99%)    Gen: NAD      Spine PE:  Skin intact  No gross deformity  No midnline TTP C/T/L/S spine  No bony step offs  No paraspinal muscle ttp/hypertonicity   Negative Straight leg raise  Negative clonus  Negative babinski  Negative pool  No saddle anesthesia    Motor:                   C5                C6              C7               C8           T1   R            5/5                5/5            5/5             5/5          5/5  L             5/5               5/5             5/5             5/5          5/5                L2             L3             L4               L5            S1  R         5/5           5/5          5/5             5/5           5/5  L          5/5          5/5           3/5             3/5           5/5    Sensory:            C5         C6         C7      C8       T1        (0=absent, 1=impaired, 2=normal, NT=not testable)  R         2            2           2        2         2  L          2            2           2        2         2               L2          L3         L4      L5       S1         (0=absent, 1=impaired, 2=normal, NT=not testable)  R         2            2            2        2        2  L          2            2           2        2         2        Imaging:   MRI L-Spine:    1. Lumbosacral transitional anatomy with sacralization of the L5 vertebral body. Please see discussion in the body of the report for vertebral body numbering.     2. Large central disc herniation at the L4-L5 level with compression of the descending nerve roots and associated severe canal stenosis.     3. Additional smaller disc herniation at the L3-L4 level.     4. Epidural lipomatosis.       A/P: Female with chronic back pain and LLE radiculopathy with large L4/5 central disc herniation.     Pain control  WBAT with assistive devices as needed  Plan for OR on Saturday w/ Dr. Bray   Patient consented  Please document medical clearance prior to procedure (10 Sep 2020 22:17)      PAST MEDICAL & SURGICAL HISTORY:  GERD (gastroesophageal reflux disease)    History of tonsillectomy        MEDICATIONS  (STANDING):  acetaminophen   Tablet .. 975 milliGRAM(s) Oral every 6 hours  BACItracin   Ointment 1 Application(s) Topical daily  diazepam    Tablet 10 milliGRAM(s) Oral every 8 hours  gabapentin 400 milliGRAM(s) Oral every 8 hours  nicotine  Polacrilex Gum 4 milliGRAM(s) Oral every 4 hours  oxyCODONE  ER Tablet 20 milliGRAM(s) Oral every 12 hours  pantoprazole    Tablet 40 milliGRAM(s) Oral before breakfast  polyethylene glycol 3350 17 Gram(s) Oral daily  senna 2 Tablet(s) Oral at bedtime  sodium chloride 0.9%. 1000 milliLiter(s) (125 mL/Hr) IV Continuous <Continuous>    MEDICATIONS  (PRN):  bisacodyl Suppository 10 milliGRAM(s) Rectal daily PRN If no bowel movement by POD#2  HYDROmorphone   Tablet 2 milliGRAM(s) Oral every 3 hours PRN Moderate Pain (4 - 6)  HYDROmorphone   Tablet 4 milliGRAM(s) Oral every 3 hours PRN Severe Pain (7 - 10)  HYDROmorphone  Injectable 1 milliGRAM(s) IV Push every 3 hours PRN Severe breakthrough Pain (7 - 10)  hydrOXYzine hydrochloride 25 milliGRAM(s) Oral every 8 hours PRN anxiety, insomnia  magnesium hydroxide Suspension 30 milliLiter(s) Oral daily PRN Constipation  naloxone Injectable 0.1 milliGRAM(s) IV Push every 3 minutes PRN For ANY of the following changes in patient status:  A. RR LESS THAN 10 breaths per minute, B. Oxygen saturation LESS THAN 90%, C. Sedation score of 6  ondansetron Injectable 4 milliGRAM(s) IV Push every 6 hours PRN Nausea      ICU Vital Signs Last 24 Hrs  T(C): 36.8 (15 Sep 2020 10:47), Max: 37.8 (14 Sep 2020 20:30)  T(F): 98.3 (15 Sep 2020 10:47), Max: 100.1 (14 Sep 2020 20:30)  HR: 118 (15 Sep 2020 10:47) (105 - 126)  BP: 125/63 (15 Sep 2020 10:47) (101/57 - 138/82)  BP(mean): --  ABP: --  ABP(mean): --  RR: 17 (15 Sep 2020 10:47) (15 - 17)  SpO2: 100% (15 Sep 2020 10:47) (95% - 100%)      Vital Signs Last 24 Hrs  T(C): 36.8 (15 Sep 2020 10:47), Max: 37.8 (14 Sep 2020 20:30)  T(F): 98.3 (15 Sep 2020 10:47), Max: 100.1 (14 Sep 2020 20:30)  HR: 118 (15 Sep 2020 10:47) (105 - 126)  BP: 125/63 (15 Sep 2020 10:47) (101/57 - 138/82)  BP(mean): --  RR: 17 (15 Sep 2020 10:47) (15 - 17)  SpO2: 100% (15 Sep 2020 10:47) (95% - 100%)                          9.5    18.22 )-----------( 326      ( 15 Sep 2020 06:30 )             29.0     SUMMARY:            Patient seen at bedside walking with cane getting back to bed. Patient states her back pain 9/10 now. Patient states her pain was 7/10 before moving around. Patient states her pain is managed with the current pain regimen. Discussed different pain regimen plans with patient. Patient states she has been taking Oxycodone for more than 3 years. Patient states that PO Dilaudid works better for her and she has taken it in the past when she was admitted to the hospital for back pain. Patient states she prefers to stay on one opioid regimen but prefers to try Dilaudid with the Oxycontin 20mg BID that she is ordered for. Patient requesting to be discharged on Dilaudid PO. Explained to patient that Dilaudid has to covered by her insurance and also she needs to contact her chronic pain management MD regarding ordering Dilaudid with the Oxycontin. Patient requesting to start PO Dilaudid with IV Dilaudid for breakthrough now and see if it helps her with her pain.  RECOMMENDATIONS:  1) Continue PO Oxycontin 20mg BID.  2) PO Dilaudid 2mg Q3H PRN for moderate pain. Hold for over sedation. Not to be given within one hour of any other immediate acting opioid.  3) PO Dilaudid 4mg Q3H PRN for severe  pain. Hold for over sedation. Not to be given within one hour of any other immediate acting opioid.    Will reevaluate patient this afternoon.

## 2020-09-15 NOTE — OCCUPATIONAL THERAPY INITIAL EVALUATION ADULT - PERTINENT HX OF CURRENT PROBLEM, REHAB EVAL
25F hx of hypermobility syndrome, L5-S1 disc herniation with chronic low back pain with LLE weakness, endometriosis, pre-HCM (FHx of HCM, echo with borderline LVH, no phenotypic expression of HCM at present), who initially presented to [facility] for worsening back pain, MRI demonstrating large central L4/5 disc causing severe canal stenosis. Transferred to [facility] 9/10 for second opinion with Dr. Bray, planned for L4-S1 laminectomy with possible fusion 9/12. Pt is a 26 y/o Female hx of hypermobility syndrome, L5-S1 disc herniation with chronic low back pain with LLE weakness, endometriosis, pre-HCM (FHx of HCM, echo with borderline LVH, no phenotypic expression of HCM at present), who initially presented to [facility] for worsening back pain, MRI demonstrating large central L4/5 disc causing severe canal stenosis. Transferred to [facility] 9/10 for second opinion with Dr. Bray, planned for L4-S1 laminectomy with possible fusion 9/12.

## 2020-09-15 NOTE — PROGRESS NOTE ADULT - SUBJECTIVE AND OBJECTIVE BOX
[facility] Division of Hospital Medicine  Teressa Bui MD  Pager (M-F, 8A-5P): [phone]  Other Times:  [phone]    CHIEF COMPLAINT: f/u     SUBJECTIVE / OVERNIGHT EVENTS: Patient seen and examined. No acute events overnight. Pain improving, drain removed today, walked with PT.    MEDICATIONS  (STANDING):  acetaminophen   Tablet .. 975 milliGRAM(s) Oral every 6 hours  BACItracin   Ointment 1 Application(s) Topical daily  diazepam    Tablet 10 milliGRAM(s) Oral every 8 hours  gabapentin 400 milliGRAM(s) Oral every 8 hours  nicotine  Polacrilex Gum 4 milliGRAM(s) Oral every 4 hours  oxyCODONE  ER Tablet 20 milliGRAM(s) Oral every 12 hours  pantoprazole    Tablet 40 milliGRAM(s) Oral before breakfast  polyethylene glycol 3350 17 Gram(s) Oral daily  senna 2 Tablet(s) Oral at bedtime  sodium chloride 0.9%. 1000 milliLiter(s) (125 mL/Hr) IV Continuous <Continuous>    MEDICATIONS  (PRN):  bisacodyl Suppository 10 milliGRAM(s) Rectal daily PRN If no bowel movement by POD#2  HYDROmorphone  Injectable 1 milliGRAM(s) IV Push every 3 hours PRN Severe breakthrough Pain (7 - 10)  hydrOXYzine hydrochloride 25 milliGRAM(s) Oral every 8 hours PRN anxiety, insomnia  magnesium hydroxide Suspension 30 milliLiter(s) Oral daily PRN Constipation  naloxone Injectable 0.1 milliGRAM(s) IV Push every 3 minutes PRN For ANY of the following changes in patient status:  A. RR LESS THAN 10 breaths per minute, B. Oxygen saturation LESS THAN 90%, C. Sedation score of 6  ondansetron Injectable 4 milliGRAM(s) IV Push every 6 hours PRN Nausea  oxyCODONE    IR 10 milliGRAM(s) Oral every 3 hours PRN Severe Pain (7 - 10)  oxyCODONE    IR 5 milliGRAM(s) Oral every 3 hours PRN Moderate Pain (4 - 6)      VITALS:  T(F): 98.3 (09-15-20 @ 10:47), Max: 100.1 (09-14-20 @ 20:30)  HR: 118 (09-15-20 @ 10:47) (105 - 126)  BP: 125/63 (09-15-20 @ 10:47) (101/57 - 138/82)  RR: 17 (09-15-20 @ 10:47) (15 - 17)  SpO2: 100% (09-15-20 @ 10:47)    CAPILLARY BLOOD GLUCOSE    PHYSICAL EXAM:  GENERAL: NAD, well-developed  HEAD:  Atraumatic, Normocephalic  EYES: EOMI, PERRLA, conjunctiva and sclera clear  NECK: Supple, No JVD  CHEST/LUNG: Clear to auscultation bilaterally; No wheeze  HEART: Regular rate and rhythm; No murmurs, rubs, or gallops  ABDOMEN: Soft, Nontender, Nondistended; Bowel sounds present  EXTREMITIES:  2+ Peripheral Pulses, No clubbing, cyanosis, or edema  PSYCH: AAOx3  NEUROLOGY: non-focal  SKIN: No rashes or lesions    LABS:              9.5                  135  | 28   | 10           18.22 >-----------< 326     ------------------------< 122                   29.0                 3.9  | 97   | 0.51                                         Ca 8.8   Mg x     Ph x                      RADIOLOGY & ADDITIONAL TESTS:  Imaging Personally Reviewed: [x] Yes    [ ] Consultant(s) Notes Reviewed:  [x] Care Discussed with Consultants/Other Providers: Orthopedic PA - discussed

## 2020-09-16 ENCOUNTER — TRANSCRIPTION ENCOUNTER (OUTPATIENT)
Age: 25
End: 2020-09-16

## 2020-09-16 LAB
ANION GAP SERPL CALC-SCNC: 8 MMO/L — SIGNIFICANT CHANGE UP (ref 7–14)
ANISOCYTOSIS BLD QL: SLIGHT — SIGNIFICANT CHANGE UP
BASOPHILS # BLD AUTO: 0.04 K/UL — SIGNIFICANT CHANGE UP (ref 0–0.2)
BASOPHILS NFR BLD AUTO: 0.3 % — SIGNIFICANT CHANGE UP (ref 0–2)
BASOPHILS NFR SPEC: 0.9 % — SIGNIFICANT CHANGE UP (ref 0–2)
BLASTS # FLD: 0 % — SIGNIFICANT CHANGE UP (ref 0–0)
BUN SERPL-MCNC: 8 MG/DL — SIGNIFICANT CHANGE UP (ref 7–23)
CALCIUM SERPL-MCNC: 8.8 MG/DL — SIGNIFICANT CHANGE UP (ref 8.4–10.5)
CHLORIDE SERPL-SCNC: 97 MMOL/L — LOW (ref 98–107)
CO2 SERPL-SCNC: 31 MMOL/L — SIGNIFICANT CHANGE UP (ref 22–31)
CREAT SERPL-MCNC: 0.4 MG/DL — LOW (ref 0.5–1.3)
EOSINOPHIL # BLD AUTO: 0.19 K/UL — SIGNIFICANT CHANGE UP (ref 0–0.5)
EOSINOPHIL NFR BLD AUTO: 1.3 % — SIGNIFICANT CHANGE UP (ref 0–6)
EOSINOPHIL NFR FLD: 2.6 % — SIGNIFICANT CHANGE UP (ref 0–6)
GIANT PLATELETS BLD QL SMEAR: PRESENT — SIGNIFICANT CHANGE UP
GLUCOSE SERPL-MCNC: 114 MG/DL — HIGH (ref 70–99)
HCT VFR BLD CALC: 26.7 % — LOW (ref 34.5–45)
HGB BLD-MCNC: 8.7 G/DL — LOW (ref 11.5–15.5)
IMM GRANULOCYTES NFR BLD AUTO: 3.1 % — HIGH (ref 0–1.5)
LYMPHOCYTES # BLD AUTO: 16.5 % — SIGNIFICANT CHANGE UP (ref 13–44)
LYMPHOCYTES # BLD AUTO: 2.47 K/UL — SIGNIFICANT CHANGE UP (ref 1–3.3)
LYMPHOCYTES NFR SPEC AUTO: 22.8 % — SIGNIFICANT CHANGE UP (ref 13–44)
MCHC RBC-ENTMCNC: 31.3 PG — SIGNIFICANT CHANGE UP (ref 27–34)
MCHC RBC-ENTMCNC: 32.6 % — SIGNIFICANT CHANGE UP (ref 32–36)
MCV RBC AUTO: 96 FL — SIGNIFICANT CHANGE UP (ref 80–100)
METAMYELOCYTES # FLD: 0 % — SIGNIFICANT CHANGE UP (ref 0–1)
MONOCYTES # BLD AUTO: 1.01 K/UL — HIGH (ref 0–0.9)
MONOCYTES NFR BLD AUTO: 6.7 % — SIGNIFICANT CHANGE UP (ref 2–14)
MONOCYTES NFR BLD: 2.6 % — SIGNIFICANT CHANGE UP (ref 2–9)
MYELOCYTES NFR BLD: 0 % — SIGNIFICANT CHANGE UP (ref 0–0)
NEUTROPHIL AB SER-ACNC: 71.1 % — SIGNIFICANT CHANGE UP (ref 43–77)
NEUTROPHILS # BLD AUTO: 10.79 K/UL — HIGH (ref 1.8–7.4)
NEUTROPHILS NFR BLD AUTO: 72.1 % — SIGNIFICANT CHANGE UP (ref 43–77)
NEUTS BAND # BLD: 0 % — SIGNIFICANT CHANGE UP (ref 0–6)
NRBC # FLD: 0 K/UL — SIGNIFICANT CHANGE UP (ref 0–0)
OTHER - HEMATOLOGY %: 0 — SIGNIFICANT CHANGE UP
PLATELET # BLD AUTO: 312 K/UL — SIGNIFICANT CHANGE UP (ref 150–400)
PLATELET COUNT - ESTIMATE: NORMAL — SIGNIFICANT CHANGE UP
PMV BLD: 9.9 FL — SIGNIFICANT CHANGE UP (ref 7–13)
POLYCHROMASIA BLD QL SMEAR: SLIGHT — SIGNIFICANT CHANGE UP
POTASSIUM SERPL-MCNC: 3.8 MMOL/L — SIGNIFICANT CHANGE UP (ref 3.5–5.3)
POTASSIUM SERPL-SCNC: 3.8 MMOL/L — SIGNIFICANT CHANGE UP (ref 3.5–5.3)
PROMYELOCYTES # FLD: 0 % — SIGNIFICANT CHANGE UP (ref 0–0)
RBC # BLD: 2.78 M/UL — LOW (ref 3.8–5.2)
RBC # FLD: 13.2 % — SIGNIFICANT CHANGE UP (ref 10.3–14.5)
SODIUM SERPL-SCNC: 136 MMOL/L — SIGNIFICANT CHANGE UP (ref 135–145)
VARIANT LYMPHS # BLD: 0 % — SIGNIFICANT CHANGE UP
WBC # BLD: 14.97 K/UL — HIGH (ref 3.8–10.5)
WBC # FLD AUTO: 14.97 K/UL — HIGH (ref 3.8–10.5)

## 2020-09-16 PROCEDURE — 99232 SBSQ HOSP IP/OBS MODERATE 35: CPT

## 2020-09-16 RX ORDER — HYDROMORPHONE HYDROCHLORIDE 2 MG/ML
6 INJECTION INTRAMUSCULAR; INTRAVENOUS; SUBCUTANEOUS
Refills: 0 | Status: DISCONTINUED | OUTPATIENT
Start: 2020-09-16 | End: 2020-09-17

## 2020-09-16 RX ORDER — TIZANIDINE 4 MG/1
2 TABLET ORAL EVERY 6 HOURS
Refills: 0 | Status: DISCONTINUED | OUTPATIENT
Start: 2020-09-16 | End: 2020-09-17

## 2020-09-16 RX ORDER — HYDROMORPHONE HYDROCHLORIDE 2 MG/ML
2 INJECTION INTRAMUSCULAR; INTRAVENOUS; SUBCUTANEOUS
Refills: 0 | Status: DISCONTINUED | OUTPATIENT
Start: 2020-09-16 | End: 2020-09-17

## 2020-09-16 RX ORDER — HYDROMORPHONE HYDROCHLORIDE 2 MG/ML
1 INJECTION INTRAMUSCULAR; INTRAVENOUS; SUBCUTANEOUS ONCE
Refills: 0 | Status: DISCONTINUED | OUTPATIENT
Start: 2020-09-16 | End: 2020-09-16

## 2020-09-16 RX ORDER — HYDROMORPHONE HYDROCHLORIDE 2 MG/ML
4 INJECTION INTRAMUSCULAR; INTRAVENOUS; SUBCUTANEOUS
Refills: 0 | Status: DISCONTINUED | OUTPATIENT
Start: 2020-09-16 | End: 2020-09-17

## 2020-09-16 RX ORDER — DIAZEPAM 5 MG
10 TABLET ORAL EVERY 12 HOURS
Refills: 0 | Status: ACTIVE | OUTPATIENT
Start: 2020-09-16 | End: 2020-09-23

## 2020-09-16 RX ADMIN — HYDROMORPHONE HYDROCHLORIDE 6 MILLIGRAM(S): 2 INJECTION INTRAMUSCULAR; INTRAVENOUS; SUBCUTANEOUS at 22:25

## 2020-09-16 RX ADMIN — HYDROMORPHONE HYDROCHLORIDE 1 MILLIGRAM(S): 2 INJECTION INTRAMUSCULAR; INTRAVENOUS; SUBCUTANEOUS at 11:16

## 2020-09-16 RX ADMIN — HYDROMORPHONE HYDROCHLORIDE 6 MILLIGRAM(S): 2 INJECTION INTRAMUSCULAR; INTRAVENOUS; SUBCUTANEOUS at 15:20

## 2020-09-16 RX ADMIN — HYDROMORPHONE HYDROCHLORIDE 1 MILLIGRAM(S): 2 INJECTION INTRAMUSCULAR; INTRAVENOUS; SUBCUTANEOUS at 11:57

## 2020-09-16 RX ADMIN — HYDROMORPHONE HYDROCHLORIDE 1 MILLIGRAM(S): 2 INJECTION INTRAMUSCULAR; INTRAVENOUS; SUBCUTANEOUS at 06:37

## 2020-09-16 RX ADMIN — HYDROMORPHONE HYDROCHLORIDE 2 MILLIGRAM(S): 2 INJECTION INTRAMUSCULAR; INTRAVENOUS; SUBCUTANEOUS at 22:37

## 2020-09-16 RX ADMIN — PANTOPRAZOLE SODIUM 40 MILLIGRAM(S): 20 TABLET, DELAYED RELEASE ORAL at 05:14

## 2020-09-16 RX ADMIN — HYDROMORPHONE HYDROCHLORIDE 1 MILLIGRAM(S): 2 INJECTION INTRAMUSCULAR; INTRAVENOUS; SUBCUTANEOUS at 14:48

## 2020-09-16 RX ADMIN — HYDROMORPHONE HYDROCHLORIDE 1 MILLIGRAM(S): 2 INJECTION INTRAMUSCULAR; INTRAVENOUS; SUBCUTANEOUS at 14:33

## 2020-09-16 RX ADMIN — SENNA PLUS 2 TABLET(S): 8.6 TABLET ORAL at 21:34

## 2020-09-16 RX ADMIN — HYDROMORPHONE HYDROCHLORIDE 6 MILLIGRAM(S): 2 INJECTION INTRAMUSCULAR; INTRAVENOUS; SUBCUTANEOUS at 18:26

## 2020-09-16 RX ADMIN — TIZANIDINE 2 MILLIGRAM(S): 4 TABLET ORAL at 15:34

## 2020-09-16 RX ADMIN — HYDROMORPHONE HYDROCHLORIDE 1 MILLIGRAM(S): 2 INJECTION INTRAMUSCULAR; INTRAVENOUS; SUBCUTANEOUS at 03:37

## 2020-09-16 RX ADMIN — HYDROMORPHONE HYDROCHLORIDE 6 MILLIGRAM(S): 2 INJECTION INTRAMUSCULAR; INTRAVENOUS; SUBCUTANEOUS at 21:33

## 2020-09-16 RX ADMIN — HYDROMORPHONE HYDROCHLORIDE 2 MILLIGRAM(S): 2 INJECTION INTRAMUSCULAR; INTRAVENOUS; SUBCUTANEOUS at 16:18

## 2020-09-16 RX ADMIN — HYDROMORPHONE HYDROCHLORIDE 2 MILLIGRAM(S): 2 INJECTION INTRAMUSCULAR; INTRAVENOUS; SUBCUTANEOUS at 17:18

## 2020-09-16 RX ADMIN — HYDROMORPHONE HYDROCHLORIDE 1 MILLIGRAM(S): 2 INJECTION INTRAMUSCULAR; INTRAVENOUS; SUBCUTANEOUS at 03:52

## 2020-09-16 RX ADMIN — HYDROMORPHONE HYDROCHLORIDE 2 MILLIGRAM(S): 2 INJECTION INTRAMUSCULAR; INTRAVENOUS; SUBCUTANEOUS at 19:23

## 2020-09-16 RX ADMIN — HYDROMORPHONE HYDROCHLORIDE 6 MILLIGRAM(S): 2 INJECTION INTRAMUSCULAR; INTRAVENOUS; SUBCUTANEOUS at 16:20

## 2020-09-16 RX ADMIN — HYDROMORPHONE HYDROCHLORIDE 1 MILLIGRAM(S): 2 INJECTION INTRAMUSCULAR; INTRAVENOUS; SUBCUTANEOUS at 07:00

## 2020-09-16 RX ADMIN — HYDROMORPHONE HYDROCHLORIDE 2 MILLIGRAM(S): 2 INJECTION INTRAMUSCULAR; INTRAVENOUS; SUBCUTANEOUS at 23:08

## 2020-09-16 RX ADMIN — Medication 10 MILLIGRAM(S): at 17:25

## 2020-09-16 NOTE — DISCHARGE NOTE PROVIDER - NSDCCPTREATMENT_GEN_ALL_CORE_FT
PRINCIPAL PROCEDURE  Procedure: Lumbar laminectomy with discectomy with fusion  Findings and Treatment:        PRINCIPAL PROCEDURE  Procedure: Lumbar laminectomy with discectomy with fusion  Findings and Treatment: dictated operative note  NO ADVIL, ASPIRIN, MOTRIN, IBUPROFEN until instructed otherwise by surgeon  no heavy lifting and bending

## 2020-09-16 NOTE — PROGRESS NOTE ADULT - SUBJECTIVE AND OBJECTIVE BOX
Orthopaedic Surgery Progress Note    Subjective:   Patient seen and examined  No acute events overnight  Pain not well controlled overnight, patient very anxious about pain control.     Objective:  T(C): 36.9 (09-16-20 @ 05:09), Max: 36.9 (09-16-20 @ 05:09)  HR: 120 (09-16-20 @ 05:09) (107 - 120)  BP: 137/89 (09-16-20 @ 05:09) (112/69 - 147/69)  RR: 18 (09-16-20 @ 05:09) (16 - 18)  SpO2: 98% (09-16-20 @ 05:09) (95% - 100%)  Wt(kg): --                          9.5    18.22 )-----------( 326      ( 15 Sep 2020 06:30 )             29.0     09-15    135  |  97<L>  |  10  ----------------------------<  122<H>  3.9   |  28  |  0.51    Ca    8.8      15 Sep 2020 06:30      PE    NAD  Back:   dressing C/D/I, mild appropriate tonya-incisional ttp  Neuro:  RLE IP 5/5 HS 5/5 Q 5/5 GS 5/5 TA 5/5 EHL 5/5   SILT L2-S1  LLE IP 5/5 HS 5/5 Q 5/5 GS 5/5 TA 4+/5 EHL 4+/5  SILT L2-S1  WWP BLE                 25y Female s/p L4-5 lami/dis/PSF POD3  - Pain control - finalize prior to DC, FU APS and chronic pain (Dr. Martin)  - WBAT  - PT/OT/OOB  - I/S  - SCDs  - Dispo planning - home

## 2020-09-16 NOTE — DISCHARGE NOTE PROVIDER - CARE PROVIDER_API CALL
Mike Bray  ORTHOPAEDIC SURGERY  [address], [address]  [place] [address]  Phone: [phone]  Fax: [phone]  Established Patient  Follow Up Time:

## 2020-09-16 NOTE — DISCHARGE NOTE NURSING/CASE MANAGEMENT/SOCIAL WORK - PATIENT PORTAL LINK FT
You can access the FollowMyHealth Patient Portal offered by [facility] by registering at the following website: [email or web address]. By joining [facility]’s FollowMyHealth portal, you will also be able to view your health information using other applications (apps) compatible with our system.

## 2020-09-16 NOTE — PROVIDER CONTACT NOTE (OTHER) - SITUATION
Patient on PCA pump with 10/10  pain with no relief. Patient frustrated and crying. Patient was on the toilet and wasn't able to comply with requests. PT called and came to bathroom to assist.
Pt. c/o severe back pain despite getting pain medications
Pt with edema +2 B/L ankles

## 2020-09-16 NOTE — PROGRESS NOTE ADULT - SUBJECTIVE AND OBJECTIVE BOX
Chief Complaint:  unrelieved surgical pain    HPI:  [facility].  [facility]:  Patient is a 25y Female who presented w/ chronic low back and pain down L leg for multiple years. Patient states her symptoms have been getting worse over the last 8 weeks.  Denies any new trauma. Patient has seen Dr. Bray as an outpatient in 2019 for the low back pain. MRI revealed large central L4/5 disc herniation. She was evaluated by neurosurgery who recommended a L4/5 discectomy. However, patient is requesting to have a spinal fusion due to her back pain, and is requesting a second opinion by Dr. Bray. Endorses numbness/tingling/paresthesias/ and weakness of LLE. Denies bowel/bladder incontinence. Denies fevers/chills. No other complaints at this time.    At [facility], patient had OR last Saturday w/ Dr. Bray.    MEDICATIONS  (STANDING):  acetaminophen   Tablet .. 975 milliGRAM(s) Oral every 8 hours  dexAMETHasone  Injectable 4 milliGRAM(s) IV Push every 8 hours  diazepam    Tablet 5 milliGRAM(s) Oral at bedtime  diazepam    Tablet 5 milliGRAM(s) Oral <User Schedule>  heparin   Injectable 7500 Unit(s) SubCutaneous every 8 hours  nicotine  Polacrilex Gum 4 milliGRAM(s) Oral every 4 hours  pantoprazole    Tablet 40 milliGRAM(s) Oral before breakfast  senna 2 Tablet(s) Oral at bedtime      Allergies    penicillins (Rash)  sulfa drugs (Rash)    Intolerances        PAST MEDICAL & SURGICAL HISTORY:  GERD (gastroesophageal reflux disease)  History of tonsillectomy  Endometriosis  Low Back Pain                          14.5   16.25 )-----------( 440      ( 09 Sep 2020 12:48 )             42.3         Vital Signs Last 24 Hrs  T(C): 36.6 (09-10-20 @ 20:59), Max: 36.8 (09-10-20 @ 14:06)  T(F): 97.9 (09-10-20 @ 20:59), Max: 98.3 (09-10-20 @ 14:06)  HR: 78 (09-10-20 @ 20:59) (70 - 98)  BP: 131/94 (09-10-20 @ 22:03) (125/70 - 147/103)  BP(mean): --  RR: 16 (09-10-20 @ 20:59) (15 - 17)  SpO2: 95% (09-10-20 @ 20:59) (95% - 99%)    Gen: NAD      Spine PE:  Skin intact  No gross deformity  No midnline TTP C/T/L/S spine  No bony step offs  No paraspinal muscle ttp/hypertonicity   Negative Straight leg raise  Negative clonus  Negative babinski  Negative pool  No saddle anesthesia    Motor:                   C5                C6              C7               C8           T1   R            5/5                5/5            5/5             5/5          5/5  L             5/5               5/5             5/5             5/5          5/5                L2             L3             L4               L5            S1  R         5/5           5/5          5/5             5/5           5/5  L          5/5          5/5           3/5             3/5           5/5    Sensory:            C5         C6         C7      C8       T1        (0=absent, 1=impaired, 2=normal, NT=not testable)  R         2            2           2        2         2  L          2            2           2        2         2               L2          L3         L4      L5       S1         (0=absent, 1=impaired, 2=normal, NT=not testable)  R         2            2            2        2        2  L          2            2           2        2         2        Imaging:   MRI L-Spine:    1. Lumbosacral transitional anatomy with sacralization of the L5 vertebral body. Please see discussion in the body of the report for vertebral body numbering.     2. Large central disc herniation at the L4-L5 level with compression of the descending nerve roots and associated severe canal stenosis.     3. Additional smaller disc herniation at the L3-L4 level.     4. Epidural lipomatosis.       A/P: Female with chronic back pain and LLE radiculopathy with large L4/5 central disc herniation.     Pain control  WBAT with assistive devices as needed  Plan for OR on Saturday w/ Dr. Bray   Patient consented  Please document medical clearance prior to procedure (10 Sep 2020 22:17)      PAST MEDICAL & SURGICAL HISTORY:  GERD (gastroesophageal reflux disease)    History of tonsillectomy    Allergies    penicillins (Rash)  sulfa drugs (Rash)    Intolerances        PAIN MEDICATIONS:  diazepam    Tablet 10 milliGRAM(s) Oral every 12 hours  gabapentin 400 milliGRAM(s) Oral every 8 hours  HYDROmorphone   Tablet 4 milliGRAM(s) Oral every 3 hours PRN  HYDROmorphone   Tablet 6 milliGRAM(s) Oral every 3 hours PRN  HYDROmorphone   Tablet 2 milliGRAM(s) Oral every 3 hours PRN  hydrOXYzine hydrochloride 25 milliGRAM(s) Oral every 8 hours PRN  ondansetron Injectable 4 milliGRAM(s) IV Push every 6 hours PRN  oxyCODONE  ER Tablet 20 milliGRAM(s) Oral every 12 hours  tiZANidine 2 milliGRAM(s) Oral every 6 hours PRN        GI:  bisacodyl Suppository 10 milliGRAM(s) Rectal daily PRN  magnesium hydroxide Suspension 30 milliLiter(s) Oral daily PRN  pantoprazole    Tablet 40 milliGRAM(s) Oral before breakfast  polyethylene glycol 3350 17 Gram(s) Oral daily  senna 2 Tablet(s) Oral at bedtime    All Other Medications:  BACItracin   Ointment 1 Application(s) Topical daily  naloxone Injectable 0.1 milliGRAM(s) IV Push every 3 minutes PRN  nicotine  Polacrilex Gum 4 milliGRAM(s) Oral every 4 hours  sodium chloride 0.9%. 1000 milliLiter(s) IV Continuous <Continuous>    Vital Signs Last 24 Hrs  T(C): 37.1 (16 Sep 2020 09:46), Max: 37.1 (16 Sep 2020 09:46)  T(F): 98.7 (16 Sep 2020 09:46), Max: 98.7 (16 Sep 2020 09:46)  HR: 108 (16 Sep 2020 11:22) (104 - 120)  BP: 134/91 (16 Sep 2020 11:22) (121/68 - 147/69)  BP(mean): --  RR: 20 (16 Sep 2020 11:22) (16 - 20)  SpO2: 99% (16 Sep 2020 11:22) (96% - 100%)    PAIN SCORE: 8/10        SCALE USED: (1-10 VNRS)           LABS:                          8.7    14.97 )-----------( 312      ( 16 Sep 2020 06:47 )             26.7     09-16    136  |  97<L>  |  8   ----------------------------<  114<H>  3.8   |  31  |  0.40<L>    Ca    8.8      16 Sep 2020 06:47    [ x]  [place]  Reviewed    PHYSICAL EXAM:  GENERAL: Alert & Oriented x 3 in NAD, well-groomed, well-developed       Impression/Plan: Follow up consult  to help manage pain. IV Dilaudid changed to po Dilaudid for PRN breakthrough pain.  Patient's valium changed to Valium q 12 hours, and added Tizanidine 2 mg every 6 hours PRN for muscle spasms.  Patient is now going to be on strictly po opioid medications for pain now, along with non-opioid adjuvant analgesics. Patient is aware and is on board with plan. May call Pain Service if further assistance needed.

## 2020-09-16 NOTE — DISCHARGE NOTE PROVIDER - HOSPITAL COURSE
26 yo s/p L4-5 lami, disc, PSF, interbody with Dr Bray on 9/12/20. Patient tolerated the procedure well without any complications.  Patient tolerated physical therapy well and pain is controlled.   A medical co-management attending has followed patient  for continuity of care and management and cleared for safe discharge.  Please follow up with Dr Bray in 1-2 weeks.  Call office to make an appointment [phone].  Please follow up with your primary care physician as medications may have changed.  Please avoid any NSAIDS, aspirin or anti-inflammatory medications unless otherwise specified by your surgeon.  Avoid any heavy lifting, bending, squatting, twisting motion,  Keep dressing and/or incision clean and dry.  Patient may shower, please avoid aiming shower stream directly onto incision.  Sutures/staples to be removed at office postop visit POD 14.  Patient is weight bear as tolerated.  Please notify Ortho with any questions.     ISTOP 24yo female admitted for Lumbar Spine L3-5 herniated nucleus pulposus and underwent Lumbar L4-5 laminectomy/discectomy with posterior spinal fusion 9/12/2020 by Dr Bray.  Patient tolerated the procedure well without any complications.  Patient tolerated physical therapy well - cleared for home discharge by Physical Therapy, ambulated out of bed and passed stairs.  Avoid any heavy lifting, bending, squatting, twisting motion.  Keep dressing and incision clean and dry. - may shower, please avoid direct stream of water directly onto incision.  Sutures/staples if applicable to be removed at Dr. Bray's office day 14 from date of surgery.  NO ADVIL, ASPIRIN, MOTRIN, IBUPROFEN until instructed otherwise by surgeon.  Hospitalist (medical co-management attending) followed the patient for continuity of care and medical management.  Patient also was seen by Pain Service for management of pain - follow up with private Chronic Pain Physician when discharged for continuity of care.  Follow up private MD/Internist for continuity of care as well.  Follow up with Dr. Bray in the office - call the office to make 2weeks-from date of surgery appointment, [phone]    IStop reviewed Reference #: [id number]

## 2020-09-16 NOTE — PROGRESS NOTE ADULT - SUBJECTIVE AND OBJECTIVE BOX
[facility] Division of Hospital Medicine  Teressa Bui MD  Pager (M-F, 8A-5P): [phone]  Other Times:  [id number]    CHIEF COMPLAINT: f/u     SUBJECTIVE / OVERNIGHT EVENTS: Patient seen and examined. still complaining of significant back pain, LE pain in better, working with PT    MEDICATIONS  (STANDING):  BACItracin   Ointment 1 Application(s) Topical daily  diazepam    Tablet 10 milliGRAM(s) Oral every 8 hours  gabapentin 400 milliGRAM(s) Oral every 8 hours  nicotine  Polacrilex Gum 4 milliGRAM(s) Oral every 4 hours  oxyCODONE  ER Tablet 20 milliGRAM(s) Oral every 12 hours  pantoprazole    Tablet 40 milliGRAM(s) Oral before breakfast  polyethylene glycol 3350 17 Gram(s) Oral daily  senna 2 Tablet(s) Oral at bedtime  sodium chloride 0.9%. 1000 milliLiter(s) (125 mL/Hr) IV Continuous <Continuous>    MEDICATIONS  (PRN):  bisacodyl Suppository 10 milliGRAM(s) Rectal daily PRN If no bowel movement by POD#2  HYDROmorphone   Tablet 2 milliGRAM(s) Oral every 3 hours PRN Moderate Pain (4 - 6)  HYDROmorphone   Tablet 4 milliGRAM(s) Oral every 3 hours PRN Severe Pain (7 - 10)  HYDROmorphone  Injectable 1 milliGRAM(s) IV Push every 3 hours PRN Severe breakthrough Pain (7 - 10)  hydrOXYzine hydrochloride 25 milliGRAM(s) Oral every 8 hours PRN anxiety, insomnia  magnesium hydroxide Suspension 30 milliLiter(s) Oral daily PRN Constipation  naloxone Injectable 0.1 milliGRAM(s) IV Push every 3 minutes PRN For ANY of the following changes in patient status:  A. RR LESS THAN 10 breaths per minute, B. Oxygen saturation LESS THAN 90%, C. Sedation score of 6  ondansetron Injectable 4 milliGRAM(s) IV Push every 6 hours PRN Nausea      VITALS:  T(F): 98.7 (09-16-20 @ 09:46), Max: 98.7 (09-16-20 @ 09:46)  HR: 104 (09-16-20 @ 09:46) (104 - 120)  BP: 121/80 (09-16-20 @ 09:46) (121/68 - 147/69)  RR: 18 (09-16-20 @ 09:46) (16 - 18)  SpO2: 100% (09-16-20 @ 09:46)    CAPILLARY BLOOD GLUCOSE    PHYSICAL EXAM:  GENERAL: NAD, well-developed  EYES: PERRLA, conjunctiva and sclera clear  CHEST/LUNG: Clear to auscultation bilaterally; No wheeze,  normal respiratory effort   HEART: Regular rate and rhythm; No murmurs, rubs, or gallops, no LE edema  ABDOMEN: Soft, Nontender, Nondistended; Bowel sounds present  EXTREMITIES:  2+ Peripheral Pulses, No clubbing, cyanosis  PSYCH: AAOx3, anxious   SKIN: No rashes or lesions, surgical site c/d/i    LABS:              8.7                  136  | 31   | 8            14.97 >-----------< 312     ------------------------< 114                   26.7                 3.8  | 97   | 0.40                                         Ca 8.8   Mg x     Ph x                      RADIOLOGY & ADDITIONAL TESTS:  Imaging Personally Reviewed: [x] Yes    [ ] Consultant(s) Notes Reviewed:  [x] Care Discussed with Consultants/Other Providers: Orthopedic PA - discussed

## 2020-09-16 NOTE — DISCHARGE NOTE PROVIDER - NSDCCPCAREPLAN_GEN_ALL_CORE_FT
PRINCIPAL DISCHARGE DIAGNOSIS  Diagnosis: Lumbar disc herniation  Assessment and Plan of Treatment:        PRINCIPAL DISCHARGE DIAGNOSIS  Diagnosis: Lumbar disc herniation  Assessment and Plan of Treatment: 26yo female admitted for Lumbar Spine L3-5 herniated nucleus pulposus and underwent Lumbar L4-5 laminectomy/discectomy with posterior spinal fusion 9/12/2020 by Dr Bray.  Patient tolerated the procedure well without any complications.  Patient tolerated physical therapy well - cleared for home discharge by Physical Therapy, ambulated out of bed and passed stairs.  Avoid any heavy lifting, bending, squatting, twisting motion.  Keep dressing and incision clean and dry. - may shower, please avoid direct stream of water directly onto incision.  Sutures/staples if applicable to be removed at Dr. Bray's office day 14 from date of surgery.  NO ADVIL, ASPIRIN, MOTRIN, IBUPROFEN until instructed otherwise by surgeon.  Hospitalist (medical co-management attending) followed the patient for continuity of care and medical management.  Patient also was seen by Pain Service for management of pain - follow up with private Chronic Pain Physician when discharged for continuity of care.  Follow up private MD/Internist for continuity of care as well.  Follow up with Dr. Bray in the office - call the office to make 2weeks-from date of surgery appointment, [phone]  IStop reviewed Reference #: [id number]

## 2020-09-16 NOTE — DISCHARGE NOTE NURSING/CASE MANAGEMENT/SOCIAL WORK - NSDCFUADDAPPT_GEN_ALL_CORE_FT
Follow-up with Dr. Bray in the office as instructed for post-op check as well as with PMD for continuity of care.

## 2020-09-16 NOTE — DISCHARGE NOTE NURSING/CASE MANAGEMENT/SOCIAL WORK - NSDPDISTO_GEN_ALL_CORE
Pt Back incision with dressing intact, positive NV status. VS stable Afebrile. pt khris po diet, voiding without difficulty. khris PT in AM, amb without difficulty and cleared for DC to home as per safe DC plan./Home

## 2020-09-16 NOTE — DISCHARGE NOTE PROVIDER - NSDCCAREPROVSEEN_GEN_ALL_CORE_FT
Mike Bray Katarina, Mike Orozco, Radha Fernando, Sonya Sauer, Jasmeet Bourgeois, Charlie  [facility] Acute, Team  [facility] Hospitalist Orthopedics, Team

## 2020-09-16 NOTE — PROVIDER CONTACT NOTE (OTHER) - REASON
Patient in 10/10 pain, not able to comply with requests
Pt. c/o uncontrolled pain
Pt with edema +2 B/L ankles

## 2020-09-16 NOTE — DISCHARGE NOTE PROVIDER - NSDCACTIVITY_GEN_ALL_CORE
No heavy lifting/straining Do not drive or operate machinery/Do not make important decisions/Stairs allowed/Walking - Indoors allowed/Showering allowed/No heavy lifting/straining/Walking - Outdoors allowed

## 2020-09-16 NOTE — PROVIDER CONTACT NOTE (OTHER) - BACKGROUND
L4-L5 Lami w/ post. spinal fusion
Patient had lumbar fusion on 9/12
Pt admitted with L3-L5 lami fusion. POD #4

## 2020-09-16 NOTE — PROVIDER CONTACT NOTE (OTHER) - ASSESSMENT
Patient on PCA pump with 10/10  pain with no relief. Patient frustrated and crying. Patient was on the toilet and wasn't able comply with requests. PT called and came to bedside to assist.
Pt. stated to me that she got out of bed to use the bathroom without using her call bell. When I was rounding on pt. I found her standing at the side of her bed crying in pain. I assisted pt. into bed. Surgical incision & dsg. CDI. Pt. was c/o severe pain; pain meds administered appropriately w/ no relief. Pt. stated that she is "Scared that something happened to my back". Pt. in no other acute distress besides uncontrolled pain. MD Carrington notified
Pt noted to have edema +2 B/L ankles. VS stable except for heart rate elevated, baseline as per pt.

## 2020-09-16 NOTE — DISCHARGE NOTE PROVIDER - NSDCMRMEDTOKEN_GEN_ALL_CORE_FT
ALPRAZolam 0.5 mg oral tablet: 1 tab(s) orally every 6 hours, As needed, anxiety  dexamethasone: 4 milligram(s) intravenously every 8 hours  diazePAM 2 mg oral tablet: 1 tab(s) orally once a day  diazePAM 5 mg oral tablet: 1 tab(s) orally once a day (at bedtime)  heparin: 5000 unit(s) subcutaneous every 8 hours  HYDROmorphone: 0.5 milligram(s) intravenous every 4 hours, As Needed for severe pain (7-10)  nicotine 7 mg/24 hr transdermal film, extended release: 1 patch transdermal once a day  oxycodone-acetaminophen 5 mg-325 mg oral tablet: 2 tab(s) orally every 6 hours, As needed, Moderate Pain (4 - 6)  pantoprazole 40 mg oral delayed release tablet: 1 tab(s) orally once a day (before a meal)   Colace 100 mg oral capsule: 1 cap(s) orally 3 times a day   diazePAM 10 mg oral tablet: 1 tab(s) orally every 8 hours as needed for muscle spasms  MDD:THREE TABS  Dilaudid 4 mg oral tablet: 1 tab(s) orally every 3 hours as needed for moderate to severe pain  begin tapering off as pain decreases MDD:EIGHT TABS  gabapentin 800 mg oral tablet: 1 tab(s) orally every 8 hours  nicotine 7 mg/24 hr transdermal film, extended release: 1 patch transdermal once a day  pantoprazole 40 mg oral delayed release tablet: 1 tab(s) orally once a day (before a meal)  senna oral tablet: 2 tab(s) orally once a day (at bedtime)   Colace 100 mg oral capsule: 1 cap(s) orally 3 times a day   diazePAM 10 mg oral tablet: 1 tab(s) orally every 8 hours as needed for muscle spasms  MDD:THREE TABS  gabapentin 800 mg oral tablet: 1 tab(s) orally every 8 hours  nicotine 7 mg/24 hr transdermal film, extended release: 1 patch transdermal once a day  pantoprazole 40 mg oral delayed release tablet: 1 tab(s) orally once a day (before a meal)  senna oral tablet: 2 tab(s) orally once a day (at bedtime)

## 2020-09-16 NOTE — DISCHARGE NOTE NURSING/CASE MANAGEMENT/SOCIAL WORK - NSDCPECAREGIVERED_GEN_ALL_CORE
Yes/Medline and carenotes for surgical procedure Laminectomy, as well as DC Medications Oxycontin, Dilaudid, Gabapentin, Valiun, Nicotine gum, Smoking cessation literature, and side effects literature for patient reference.

## 2020-09-16 NOTE — PROVIDER CONTACT NOTE (OTHER) - ACTION/TREATMENT ORDERED:
PA notified. States will assess pt. No interventions stated. Will continue to monitor.
No further orders at this time.
Ortho came to bedside to assess patient. Will continue to monitor.

## 2020-09-16 NOTE — PROVIDER CONTACT NOTE (OTHER) - RECOMMENDATIONS
MD Visit to assess back & pain mgmt reconsult.
Notify ortho team. Notify pain management.
Notify PA.

## 2020-09-16 NOTE — DISCHARGE NOTE NURSING/CASE MANAGEMENT/SOCIAL WORK - NSDCPNINST_GEN_ALL_CORE
Maintain back incision and dressing clean dry and intact. Call MD with any signs of infection ie fever, redness, drainage, or with signs of bleeding, unrelieved increased pain, or persistent nausea. Avoid twisting motion and remember to logroll to turn in bed. Continue to drink plenty of fluids. Avoid strenuous activity and constipation which may be a side effect from taking certain medications and narcotics. No heavy lifting greater than 10 pounds, ie. a gallon of milk. Safety and fall prevention measures reinforced. Follow-up with MD in office as instructed.

## 2020-09-17 VITALS
OXYGEN SATURATION: 98 % | TEMPERATURE: 99 F | RESPIRATION RATE: 16 BRPM | SYSTOLIC BLOOD PRESSURE: 134 MMHG | DIASTOLIC BLOOD PRESSURE: 99 MMHG | HEART RATE: 114 BPM

## 2020-09-17 PROCEDURE — 99232 SBSQ HOSP IP/OBS MODERATE 35: CPT

## 2020-09-17 PROCEDURE — 99238 HOSP IP/OBS DSCHRG MGMT 30/<: CPT

## 2020-09-17 RX ORDER — SENNA PLUS 8.6 MG/1
2 TABLET ORAL
Qty: 60 | Refills: 0
Start: 2020-09-17 | End: 2020-10-16

## 2020-09-17 RX ORDER — HYDROMORPHONE HYDROCHLORIDE 2 MG/ML
2 INJECTION INTRAMUSCULAR; INTRAVENOUS; SUBCUTANEOUS
Qty: 84 | Refills: 0
Start: 2020-09-17 | End: 2020-09-23

## 2020-09-17 RX ORDER — CLINDAMYCIN HYDROCHLORIDE 300 MG/1
300 CAPSULE ORAL EVERY 6 HOURS
Qty: 28 | Refills: 0 | Status: ACTIVE | COMMUNITY
Start: 2020-09-17 | End: 1900-01-01

## 2020-09-17 RX ORDER — GABAPENTIN 400 MG/1
1 CAPSULE ORAL
Qty: 45 | Refills: 0
Start: 2020-09-17 | End: 2020-10-01

## 2020-09-17 RX ORDER — DOCUSATE SODIUM 100 MG
1 CAPSULE ORAL
Qty: 90 | Refills: 0
Start: 2020-09-17 | End: 2020-10-16

## 2020-09-17 RX ORDER — DIAZEPAM 5 MG
1 TABLET ORAL
Qty: 45 | Refills: 0
Start: 2020-09-17 | End: 2020-10-01

## 2020-09-17 RX ORDER — DIAZEPAM 5 MG
10 TABLET ORAL EVERY 8 HOURS
Refills: 0 | Status: DISCONTINUED | OUTPATIENT
Start: 2020-09-17 | End: 2020-09-17

## 2020-09-17 RX ORDER — HYDROMORPHONE HYDROCHLORIDE 2 MG/ML
1 INJECTION INTRAMUSCULAR; INTRAVENOUS; SUBCUTANEOUS
Qty: 56 | Refills: 0
Start: 2020-09-17 | End: 2020-09-23

## 2020-09-17 RX ADMIN — HYDROMORPHONE HYDROCHLORIDE 6 MILLIGRAM(S): 2 INJECTION INTRAMUSCULAR; INTRAVENOUS; SUBCUTANEOUS at 03:59

## 2020-09-17 RX ADMIN — HYDROMORPHONE HYDROCHLORIDE 6 MILLIGRAM(S): 2 INJECTION INTRAMUSCULAR; INTRAVENOUS; SUBCUTANEOUS at 10:25

## 2020-09-17 RX ADMIN — HYDROMORPHONE HYDROCHLORIDE 2 MILLIGRAM(S): 2 INJECTION INTRAMUSCULAR; INTRAVENOUS; SUBCUTANEOUS at 16:00

## 2020-09-17 RX ADMIN — HYDROMORPHONE HYDROCHLORIDE 6 MILLIGRAM(S): 2 INJECTION INTRAMUSCULAR; INTRAVENOUS; SUBCUTANEOUS at 01:38

## 2020-09-17 RX ADMIN — Medication 10 MILLIGRAM(S): at 06:35

## 2020-09-17 RX ADMIN — TIZANIDINE 2 MILLIGRAM(S): 4 TABLET ORAL at 00:53

## 2020-09-17 RX ADMIN — HYDROMORPHONE HYDROCHLORIDE 6 MILLIGRAM(S): 2 INJECTION INTRAMUSCULAR; INTRAVENOUS; SUBCUTANEOUS at 14:37

## 2020-09-17 RX ADMIN — HYDROMORPHONE HYDROCHLORIDE 2 MILLIGRAM(S): 2 INJECTION INTRAMUSCULAR; INTRAVENOUS; SUBCUTANEOUS at 11:37

## 2020-09-17 RX ADMIN — HYDROMORPHONE HYDROCHLORIDE 2 MILLIGRAM(S): 2 INJECTION INTRAMUSCULAR; INTRAVENOUS; SUBCUTANEOUS at 15:01

## 2020-09-17 RX ADMIN — HYDROMORPHONE HYDROCHLORIDE 6 MILLIGRAM(S): 2 INJECTION INTRAMUSCULAR; INTRAVENOUS; SUBCUTANEOUS at 04:37

## 2020-09-17 RX ADMIN — HYDROMORPHONE HYDROCHLORIDE 6 MILLIGRAM(S): 2 INJECTION INTRAMUSCULAR; INTRAVENOUS; SUBCUTANEOUS at 11:23

## 2020-09-17 RX ADMIN — HYDROMORPHONE HYDROCHLORIDE 6 MILLIGRAM(S): 2 INJECTION INTRAMUSCULAR; INTRAVENOUS; SUBCUTANEOUS at 13:36

## 2020-09-17 RX ADMIN — HYDROMORPHONE HYDROCHLORIDE 6 MILLIGRAM(S): 2 INJECTION INTRAMUSCULAR; INTRAVENOUS; SUBCUTANEOUS at 00:38

## 2020-09-17 RX ADMIN — Medication 10 MILLIGRAM(S): at 14:24

## 2020-09-17 RX ADMIN — HYDROMORPHONE HYDROCHLORIDE 6 MILLIGRAM(S): 2 INJECTION INTRAMUSCULAR; INTRAVENOUS; SUBCUTANEOUS at 07:14

## 2020-09-17 RX ADMIN — HYDROMORPHONE HYDROCHLORIDE 2 MILLIGRAM(S): 2 INJECTION INTRAMUSCULAR; INTRAVENOUS; SUBCUTANEOUS at 08:24

## 2020-09-17 RX ADMIN — HYDROMORPHONE HYDROCHLORIDE 6 MILLIGRAM(S): 2 INJECTION INTRAMUSCULAR; INTRAVENOUS; SUBCUTANEOUS at 16:36

## 2020-09-17 RX ADMIN — HYDROMORPHONE HYDROCHLORIDE 2 MILLIGRAM(S): 2 INJECTION INTRAMUSCULAR; INTRAVENOUS; SUBCUTANEOUS at 12:15

## 2020-09-17 RX ADMIN — PANTOPRAZOLE SODIUM 40 MILLIGRAM(S): 20 TABLET, DELAYED RELEASE ORAL at 06:35

## 2020-09-17 RX ADMIN — HYDROMORPHONE HYDROCHLORIDE 6 MILLIGRAM(S): 2 INJECTION INTRAMUSCULAR; INTRAVENOUS; SUBCUTANEOUS at 08:02

## 2020-09-17 RX ADMIN — HYDROMORPHONE HYDROCHLORIDE 6 MILLIGRAM(S): 2 INJECTION INTRAMUSCULAR; INTRAVENOUS; SUBCUTANEOUS at 17:12

## 2020-09-17 NOTE — PROGRESS NOTE ADULT - PROBLEM SELECTOR PLAN 4
hx of pre HCM, has family hx of HCM in her maternal/paternal aunts, follows with cardiology Dr. Art Cabrales for monitoring, yearly surveillance echos. d/w Dr. Cabrales - patients' echo with borderline LVH, no overt phenotype of HCM at present, had cardionet monitoring in the past which was unremarkable  -reviewed EKG (9/10 sinus rhythm), TTE 9/9/20 LV with normal thickness  -outpt f/u cardiology
hx of pre HCM, has family hx of HCM in her maternal/paternal aunts, follows with cardiology Dr. Art Cabrales for monitoring, yearly surveillance echos. d/w Dr. Cabrales - patients' echo with borderline LVH, no overt phenotype of HCM at present, had cardionet monitoring in the past which was unremarkable  -reviewed EKG (9/10 sinus rhythm), TTE 9/9/20 LV with normal thickness  -outpt f/u cardiology
-Na 132-->135 , improved  -IVF NS postop  -trend BMP.
hx of pre HCM, has family hx of HCM in her maternal/paternal aunts, follows with cardiology Dr. Art Cabrales for monitoring, yearly surveillance echos. d/w Dr. Cabrales - patients' echo with borderline LVH, no overt phenotype of HCM at present, had cardionet monitoring in the past which was unremarkable  -reviewed EKG (9/10 sinus rhythm), TTE 9/9/20 LV with normal thickness  -c/w IVF, outpt f/u cardiology
hx of pre HCM, has family hx of HCM in her maternal/paternal aunts, follows with cardiology Dr. Art Cabrales for monitoring, yearly surveillance echos. d/w Dr. Cabrales - patients' echo with borderline LVH, no overt phenotype of HCM at present, had cardionet monitoring in the past which was unremarkable  -reviewed EKG (9/10 sinus rhythm), TTE 9/9/20 LV with normal thickness  -outpt f/u cardiology
hx of pre HCM, has family hx of HCM in her maternal/paternal aunts, follows with cardiology Dr. Art Cabrales for monitoring, yearly surveillance echos. d/w Dr. Cabrales - patients' echo with borderline LVH, no overt phenotype of HCM at present, had cardionet monitoring in the past which was unremarkable  -reviewed EKG (9/10 sinus rhythm), TTE 9/9/20 LV with normal thickness  -outpt f/u cardiology

## 2020-09-17 NOTE — PROGRESS NOTE ADULT - SUBJECTIVE AND OBJECTIVE BOX
Anesthesia Pain Management Service    SUBJECTIVE: The pain has been a little bit better on this pain regimen     Therapy:	  [ ] IV PCA	   [ ] Epidural           [ ] s/p Spinal Opoid              [ ] Postpartum infusion	  [ ] Patient controlled regional anesthesia (PCRA)    [X ] prn Analgesics    Allergies    penicillins (Rash)  sulfa drugs (Rash)    Intolerances      MEDICATIONS  (STANDING):  BACItracin   Ointment 1 Application(s) Topical daily  diazepam    Tablet 10 milliGRAM(s) Oral every 8 hours  gabapentin 400 milliGRAM(s) Oral every 8 hours  nicotine  Polacrilex Gum 4 milliGRAM(s) Oral every 4 hours  oxyCODONE  ER Tablet 20 milliGRAM(s) Oral every 12 hours  pantoprazole    Tablet 40 milliGRAM(s) Oral before breakfast  polyethylene glycol 3350 17 Gram(s) Oral daily  senna 2 Tablet(s) Oral at bedtime  sodium chloride 0.9%. 1000 milliLiter(s) (125 mL/Hr) IV Continuous <Continuous>    MEDICATIONS  (PRN):  bisacodyl Suppository 10 milliGRAM(s) Rectal daily PRN If no bowel movement by POD#2  HYDROmorphone   Tablet 4 milliGRAM(s) Oral every 3 hours PRN Moderate Pain (4 - 6)  HYDROmorphone   Tablet 6 milliGRAM(s) Oral every 3 hours PRN Severe Pain (7 - 10)  HYDROmorphone   Tablet 2 milliGRAM(s) Oral every 3 hours PRN Severe breakthrough Pain (7 - 10)  hydrOXYzine hydrochloride 25 milliGRAM(s) Oral every 8 hours PRN anxiety, insomnia  magnesium hydroxide Suspension 30 milliLiter(s) Oral daily PRN Constipation  naloxone Injectable 0.1 milliGRAM(s) IV Push every 3 minutes PRN For ANY of the following changes in patient status:  A. RR LESS THAN 10 breaths per minute, B. Oxygen saturation LESS THAN 90%, C. Sedation score of 6  ondansetron Injectable 4 milliGRAM(s) IV Push every 6 hours PRN Nausea      OBJECTIVE:   sitting at edge of bed eating breakfast     Side Effects:  [X ] None			[ ] Other:    Assessment of Catheter Site:		[ ] Intact		[ ] Other:    ASSESSMENT/PLAN  [ ] Continue current therapy    [X ] Therapy changed to:    [ ] IV PCA       [ ] Epidural     [ X] prn Analgesics     Comments: patient states she is feeling a little bit better, but wants her valium back to every 8 hours and doesn't want tizanidine. Informed patient that we will make adjustments and continue current therapy.     Progress Note written now but Patient was seen earlier.

## 2020-09-17 NOTE — PROGRESS NOTE ADULT - PROBLEM SELECTOR PLAN 2
-improving, WBC 18 today  -febrile POD2, d/w ortho likely 2/2 atelectasis held off infectious work up at this time, incentive spirometer   -likely multifactorial from dexamethasone preop, surgery  -trend fever, curve, incentive spirometry, monitor off abx, trend WBC.
-WBC 16-->19-->29, afebrile and nontoxic  -likely from dexamethasone preop and surgery  -trend temp curve, incentive spirometry, monitor off abx,  trend WBC.
-WBC 16-->19-->29-->22  -febrile this morning 100.6, d/w ortho POD day 2 likely atelectasis holding off infectious work up at this time, incentive spirometer   -likely multifactorial from dexamethasone preop, surgery  -trend fever, curve, incentive spirometry, monitor off abx, trend WBC.
-hx of hypermobility syndrome with chronic back pain, L5-S1 disc herniation  -now with acute on chronic back pain, MRI demonstrating large central L4/5 disc causing severe canal stenosis  -planned for L4-S1 laminectomy with possible fusion 9/12.   -management per ortho, on decadron  -PT eval after OR.
-improving, WBC 14 today, afebrile overnight  -febrile POD2, d/w ortho likely 2/2 atelectasis held off infectious work up at this time, incentive spirometer   -likely multifactorial from dexamethasone preop, surgery  -trend fever, curve, incentive spirometry, monitor off abx
-improving, febrile overnight  -febrile POD2, d/w ortho likely 2/2 atelectasis held off infectious work up at this time, incentive spirometer   -likely multifactorial from dexamethasone preop, surgery  -trend fever, curve, incentive spirometry, monitor off abx

## 2020-09-17 NOTE — PROGRESS NOTE ADULT - PROBLEM SELECTOR PLAN 1
-hx of hypermobility syndrome with chronic back pain, L5-S1 disc herniation  -now with acute on chronic back pain, MRI demonstrating large central L4/5 disc causing severe canal stenosis  -s/p L4-5 lami, discectomy, posterior spinal fusion, interbody fusion on 9/12  -management per ortho, pain control per pain management
-denies chest pain, shortness of breath, dizziness, palpitations at present  -hx of pre HCM, has family hx of HCM in her maternal/paternal aunts, follows with cardiology Dr. Art Cabrales for monitoring, yearly surveillance echos. d/w Dr. Cabrales - patients' echo with borderline LVH, no overt phenotype of HCM at present, had cardionet monitoring in the past which was unremarkable, not recommending inpatient cards evaluation prior to OR  -reviewed EKG (9/10 sinus rhythm), TTE 9/9/20 LV with normal thickness  -RCRI with 0.4% risk of MACE   -patient optimized for intermediate risk procedure  -no further cardiovascular testing needed prior to OR.
-hx of hypermobility syndrome with chronic back pain, L5-S1 disc herniation  -now with acute on chronic back pain, MRI demonstrating large central L4/5 disc causing severe canal stenosis  -s/p L4-5 lami, discectomy, posterior spinal fusion, interbody fusion on 9/12  -management per ortho, pain control per pain management
-hx of hypermobility syndrome with chronic back pain, L5-S1 disc herniation  -now with acute on chronic back pain, MRI demonstrating large central L4/5 disc causing severe canal stenosis  -s/p L4-5 lami, discectomy, posterior spinal fusion, interbody fusion on 9/12  -management per ortho, pain control per pain management
-hx of hypermobility syndrome with chronic back pain, L5-S1 disc herniation  -now with acute on chronic back pain, MRI demonstrating large central L4/5 disc causing severe canal stenosis  -s/p L4-5 laminectomy, discectomy, posterior spinal fusion, interbody fusion on 9/12  -management per ortho, pain control per anesthesia/pain management, on dilaudid PCA, PT/OT
-hx of hypermobility syndrome with chronic back pain, L5-S1 disc herniation  -now with acute on chronic back pain, MRI demonstrating large central L4/5 disc causing severe canal stenosis  -s/p L4-5 lami, discectomy, posterior spinal fusion, interbody fusion on 9/12  -management per ortho, pain control per pain management

## 2020-09-17 NOTE — PROGRESS NOTE ADULT - SUBJECTIVE AND OBJECTIVE BOX
[facility] Division of Hospital Medicine  Teressa Bui MD  Pager (M-F, 8A-5P): [phone]  Other Times:  [phone]    CHIEF COMPLAINT: back pain     SUBJECTIVE / OVERNIGHT EVENTS: Patient seen and examined. Pain improving.    MEDICATIONS  (STANDING):  BACItracin   Ointment 1 Application(s) Topical daily  diazepam    Tablet 10 milliGRAM(s) Oral every 12 hours  gabapentin 400 milliGRAM(s) Oral every 8 hours  nicotine  Polacrilex Gum 4 milliGRAM(s) Oral every 4 hours  oxyCODONE  ER Tablet 20 milliGRAM(s) Oral every 12 hours  pantoprazole    Tablet 40 milliGRAM(s) Oral before breakfast  polyethylene glycol 3350 17 Gram(s) Oral daily  senna 2 Tablet(s) Oral at bedtime  sodium chloride 0.9%. 1000 milliLiter(s) (125 mL/Hr) IV Continuous <Continuous>    MEDICATIONS  (PRN):  bisacodyl Suppository 10 milliGRAM(s) Rectal daily PRN If no bowel movement by POD#2  HYDROmorphone   Tablet 4 milliGRAM(s) Oral every 3 hours PRN Moderate Pain (4 - 6)  HYDROmorphone   Tablet 6 milliGRAM(s) Oral every 3 hours PRN Severe Pain (7 - 10)  HYDROmorphone   Tablet 2 milliGRAM(s) Oral every 3 hours PRN Severe breakthrough Pain (7 - 10)  hydrOXYzine hydrochloride 25 milliGRAM(s) Oral every 8 hours PRN anxiety, insomnia  magnesium hydroxide Suspension 30 milliLiter(s) Oral daily PRN Constipation  naloxone Injectable 0.1 milliGRAM(s) IV Push every 3 minutes PRN For ANY of the following changes in patient status:  A. RR LESS THAN 10 breaths per minute, B. Oxygen saturation LESS THAN 90%, C. Sedation score of 6  ondansetron Injectable 4 milliGRAM(s) IV Push every 6 hours PRN Nausea  tiZANidine 2 milliGRAM(s) Oral every 6 hours PRN spasms      VITALS:  T(F): 97.6 (09-17-20 @ 06:33), Max: 98.7 (09-16-20 @ 09:46)  HR: 104 (09-17-20 @ 06:33) (103 - 132)  BP: 128/83 (09-17-20 @ 06:33) (105/63 - 136/85)  RR: 17 (09-17-20 @ 06:33) (14 - 20)  SpO2: 100% (09-17-20 @ 06:33)    CAPILLARY BLOOD GLUCOSE    PHYSICAL EXAM:  GENERAL: NAD, well-developed  EYES: PERRLA, conjunctiva and sclera clear  CHEST/LUNG: Clear to auscultation bilaterally; No wheeze,  normal respiratory effort   HEART: Regular rate and rhythm; No murmurs, rubs, or gallops, no LE edema  ABDOMEN: Soft, Nontender, Nondistended; Bowel sounds present  EXTREMITIES:  2+ Peripheral Pulses, No clubbing, cyanosis  PSYCH: AAOx3, anxious   SKIN: No rashes or lesions, surgical site c/d/i      LABS:              8.7                  136  | 31   | 8            14.97 >-----------< 312     ------------------------< 114                   26.7                 3.8  | 97   | 0.40                                         Ca 8.8   Mg x     Ph x        RADIOLOGY & ADDITIONAL TESTS:  Imaging Personally Reviewed: [x] Yes    [ ] Consultant(s) Notes Reviewed:  [x] Care Discussed with Consultants/Other Providers: Orthopedic PA - discussed

## 2020-09-17 NOTE — PROGRESS NOTE ADULT - PROBLEM SELECTOR PROBLEM 4
Family history of cardiomegaly
Hyponatremia
Family history of cardiomegaly

## 2020-09-17 NOTE — PROGRESS NOTE ADULT - PROVIDER SPECIALTY LIST ADULT
Hospitalist
Orthopedics
Pain Medicine
Orthopedics
Pain Medicine
Hospitalist

## 2020-09-17 NOTE — PROGRESS NOTE ADULT - SUBJECTIVE AND OBJECTIVE BOX
Ortho Progress Note  JEFERSON SAM   MRN-[id number]    25yFemale is s/p L4-5 lami/discectomy with posterior spinal fusion POD#5 w/out any c/o.  Resting comfortably, NAD, ANO x 3    Vital Signs Last 24 Hrs  T(C): 36.4 (17 Sep 2020 06:33), Max: 37.1 (16 Sep 2020 09:46)  T(F): 97.6 (17 Sep 2020 06:33), Max: 98.7 (16 Sep 2020 09:46)  HR: 104 (17 Sep 2020 06:33) (103 - 132)  BP: 128/83 (17 Sep 2020 06:33) (105/63 - 136/85)  BP(mean): --  RR: 17 (17 Sep 2020 06:33) (14 - 20)  SpO2: 100% (17 Sep 2020 06:33) (97% - 100%)  penicillins (Rash)  sulfa drugs (Rash)                          8.7    14.97 )-----------( 312      ( 16 Sep 2020 06:47 )             26.7     09-16    136  |  97<L>  |  8   ----------------------------<  114<H>  3.8   |  31  |  0.40<L>    Ca    8.8      16 Sep 2020 06:47      s/p L4-5 lami/discectomy with posterior spinal fusion  Lumbar dressing C/D/I, mild expected tonya-incisional tenderness  motor BLE HS, Quad, GS, TA, EHL 5/5 symmetrically  SILT L2-S1 BLE  WWP BLE  patient moving well in bed  venodynes in place BLE               A/P Ortho Stable s/p L4-5 lami/discectomy with posterior spinal fusion POD#5  patient refusing labs this am  continue venodynes for mechanical DVT ppx  continue Physical Therapy BID: WBAT, OOB for gait training/muscle strenghtening/conditioning - patient took shower yesterday (feels better), amb w/PT yesterday 25ft in am and 50ft in afternoon  APS to adj pain med regimen accordingly - patient stated still trialing different regimens to be discharged on  discharge planning to home

## 2020-09-17 NOTE — PROGRESS NOTE ADULT - ASSESSMENT
25F hx of hypermobility syndrome, L5-S1 disc herniation with chronic low back pain with LLE weakness, endometriosis, pre-HCM (FHx of HCM, echo with borderline LVH, no phenotypic expression of HCM at present), who initially presented to [facility] for worsening back pain, MRI demonstrating large central L4/5 disc causing severe canal stenosis. Transferred to [facility] 9/10 for second opinion with Dr. Bray, s/p L4-5 lami, discectomy, posterior spinal fusion, interbody fusion on 9/12.
25F hx of hypermobility syndrome, L5-S1 disc herniation with chronic low back pain with LLE weakness, endometriosis, pre-HCM (FHx of HCM, echo with borderline LVH, no phenotypic expression of HCM at present), who initially presented to [facility] for worsening back pain, MRI demonstrating large central L4/5 disc causing severe canal stenosis. Transferred to [facility] 9/10 for second opinion with Dr. Bray, s/p L4-5 lami, discectomy, posterior spinal fusion, interbody fusion on 9/12.
25F hx of hypermobility syndrome, L5-S1 disc herniation with chronic low back pain with LLE weakness, endometriosis, pre-HCM (FHx of HCM, echo with borderline LVH, no phenotypic expression of HCM at present), who initially presented to [facility] for worsening back pain, MRI demonstrating large central L4/5 disc causing severe canal stenosis. Transferred to [facility] 9/10 for second opinion with Dr. Bray, planned for L4-S1 laminectomy with possible fusion 9/12.
25F hx of hypermobility syndrome, L5-S1 disc herniation with chronic low back pain with LLE weakness, endometriosis, pre-HCM (FHx of HCM, echo with borderline LVH, no phenotypic expression of HCM at present), who initially presented to [facility] for worsening back pain, MRI demonstrating large central L4/5 disc causing severe canal stenosis. Transferred to [facility] 9/10 for second opinion with Dr. Bray, planned for L4-S1 laminectomy with possible fusion 9/12.
25F hx of hypermobility syndrome, L5-S1 disc herniation with chronic low back pain with LLE weakness, endometriosis, pre-HCM (FHx of HCM, echo with borderline LVH, no phenotypic expression of HCM at present), who initially presented to [facility] for worsening back pain, MRI demonstrating large central L4/5 disc causing severe canal stenosis. Transferred to [facility] 9/10 for second opinion with Dr. Bray, planned for L4-S1 laminectomy with possible fusion 9/12.
25F hx of hypermobility syndrome, L5-S1 disc herniation with chronic low back pain with LLE weakness, endometriosis, pre-HCM (FHx of HCM, echo with borderline LVH, no phenotypic expression of HCM at present), who initially presented to [facility] for worsening back pain, MRI demonstrating large central L4/5 disc causing severe canal stenosis. Transferred to [facility] 9/10 for second opinion with Dr. Bray, s/p L4-5 lami, discectomy, posterior spinal fusion, interbody fusion on 9/12.

## 2020-09-17 NOTE — PROGRESS NOTE ADULT - PROBLEM SELECTOR PROBLEM 1
Chronic back pain
Preoperative examination
Chronic back pain

## 2020-09-19 ENCOUNTER — APPOINTMENT (OUTPATIENT)
Dept: ORTHOPEDIC SURGERY | Facility: CLINIC | Age: 25
End: 2020-09-19
Payer: COMMERCIAL

## 2020-09-19 PROCEDURE — G2012 BRIEF CHECK IN BY MD/QHP: CPT

## 2020-09-21 RX ORDER — HYDROMORPHONE HYDROCHLORIDE 4 MG/1
4 TABLET ORAL EVERY 4 HOURS
Qty: 56 | Refills: 0 | Status: ACTIVE | COMMUNITY
Start: 2020-09-21 | End: 1900-01-01

## 2020-09-23 ENCOUNTER — APPOINTMENT (OUTPATIENT)
Dept: ORTHOPEDIC SURGERY | Facility: CLINIC | Age: 25
End: 2020-09-23
Payer: COMMERCIAL

## 2020-09-23 DIAGNOSIS — M54.16 RADICULOPATHY, LUMBAR REGION: ICD-10-CM

## 2020-09-23 DIAGNOSIS — M48.07 SPINAL STENOSIS, LUMBOSACRAL REGION: ICD-10-CM

## 2020-09-23 PROCEDURE — 72100 X-RAY EXAM L-S SPINE 2/3 VWS: CPT

## 2020-09-23 PROCEDURE — 99024 POSTOP FOLLOW-UP VISIT: CPT

## 2020-09-25 PROBLEM — M54.16 LUMBAR RADICULOPATHY: Status: ACTIVE | Noted: 2020-09-25

## 2020-09-25 PROBLEM — M48.07 LUMBOSACRAL STENOSIS: Status: ACTIVE | Noted: 2020-09-25

## 2020-09-25 NOTE — PHYSICAL EXAM
[Cane] : ambulates with cane [de-identified] : Her incision is healing well.  There is some dried blood.  No expressible drainage.  No erythema.  Improve left lower extremity strength. [de-identified] : AP lateral lumbar x-rays reveals instrumented L5-S1 fusion

## 2020-09-25 NOTE — HISTORY OF PRESENT ILLNESS
[de-identified] : Ms. JEFERSON SAM  is a 25 year old female who presents s/p L4-5 fusion on 9/12/20.  Left leg symptoms have significantly improved.  She still has back pain.

## 2020-09-25 NOTE — DISCUSSION/SUMMARY
[de-identified] : Overall she is recovering well.  Restrictions were reviewed.  Follow-up in 3 to 4 weeks.

## 2020-09-30 DIAGNOSIS — Z98.1 ARTHRODESIS STATUS: ICD-10-CM

## 2020-09-30 RX ORDER — HYDROMORPHONE HYDROCHLORIDE 4 MG/1
4 TABLET ORAL
Qty: 42 | Refills: 0 | Status: ACTIVE | COMMUNITY
Start: 2020-09-30 | End: 1900-01-01

## 2020-10-07 RX ORDER — HYDROMORPHONE HYDROCHLORIDE 4 MG/1
4 TABLET ORAL
Qty: 42 | Refills: 0 | Status: ACTIVE | COMMUNITY
Start: 2020-10-07 | End: 1900-01-01

## 2020-10-14 ENCOUNTER — APPOINTMENT (OUTPATIENT)
Dept: ORTHOPEDIC SURGERY | Facility: CLINIC | Age: 25
End: 2020-10-14
Payer: COMMERCIAL

## 2020-10-14 DIAGNOSIS — M51.26 OTHER INTERVERTEBRAL DISC DISPLACEMENT, LUMBAR REGION: ICD-10-CM

## 2020-10-14 PROCEDURE — 99024 POSTOP FOLLOW-UP VISIT: CPT

## 2020-10-14 PROCEDURE — 72100 X-RAY EXAM L-S SPINE 2/3 VWS: CPT

## 2020-10-16 RX ORDER — HYDROMORPHONE HYDROCHLORIDE 4 MG/1
4 TABLET ORAL
Qty: 28 | Refills: 0 | Status: ACTIVE | COMMUNITY
Start: 2020-10-16 | End: 1900-01-01

## 2020-10-23 PROBLEM — M51.26 LUMBAR HERNIATED DISC: Status: ACTIVE | Noted: 2019-06-05

## 2020-10-23 NOTE — HISTORY OF PRESENT ILLNESS
[de-identified] : Ms. JEFERSON SAM  is a 25 year old female who presents s/p L4-5 fusion on 9/12/20.  Left leg symptoms have significantly improved.  She complains of tailbone pain.

## 2020-10-23 NOTE — PHYSICAL EXAM
[de-identified] : Her incision is healed.  Stable neurologic exam. [de-identified] : AP lateral lumbar x-rays reveals instrumented L5-S1 fusion

## 2020-10-23 NOTE — DISCHARGE NOTE PROVIDER - NSDCCONDITION_GEN_ALL_CORE
[facility] Medicine, [facility] on [facility]  Pager: [phone]  Spectralink: [phone]     Stable

## 2020-10-29 RX ORDER — HYDROMORPHONE HYDROCHLORIDE 4 MG/1
4 TABLET ORAL EVERY 6 HOURS
Qty: 28 | Refills: 0 | Status: ACTIVE | COMMUNITY
Start: 2020-10-29 | End: 1900-01-01

## 2020-11-11 RX ORDER — HYDROMORPHONE HYDROCHLORIDE 4 MG/1
4 TABLET ORAL
Qty: 60 | Refills: 0 | Status: ACTIVE | COMMUNITY
Start: 2020-11-11 | End: 1900-01-01

## 2020-11-13 NOTE — PROVIDER CONTACT NOTE (MEDICATION) - ASSESSMENT
Patient understands condition, prognosis and need for follow up care. patient PCA reached 4 hour max but pts IV infiltrated in the last hour so she did not actually get the pain meds when the pump delivered the dose

## 2020-11-16 ENCOUNTER — APPOINTMENT (OUTPATIENT)
Dept: ORTHOPEDIC SURGERY | Facility: CLINIC | Age: 25
End: 2020-11-16
Payer: COMMERCIAL

## 2020-11-16 DIAGNOSIS — M54.16 RADICULOPATHY, LUMBAR REGION: ICD-10-CM

## 2020-11-16 DIAGNOSIS — M51.36 OTHER INTERVERTEBRAL DISC DEGENERATION, LUMBAR REGION: ICD-10-CM

## 2020-11-16 PROCEDURE — 99024 POSTOP FOLLOW-UP VISIT: CPT

## 2020-11-16 PROCEDURE — 72100 X-RAY EXAM L-S SPINE 2/3 VWS: CPT

## 2020-11-16 NOTE — HISTORY OF PRESENT ILLNESS
[de-identified] : Ms. JEFERSON SAM  is a 25 year old female who presents s/p L4-5 fusion on 9/12/20.  Left leg symptoms have significantly improved.  Her main complaint continues to be tailbone pain.

## 2020-11-16 NOTE — DISCUSSION/SUMMARY
[de-identified] : She will begin some physical therapy.  She also follow-up with her pain management physician.  She will let me know of any changes or worsening of her symptoms.  Follow-up in 2 to 3 months.

## 2020-11-16 NOTE — PHYSICAL EXAM
[Antalgic] : not antalgic [de-identified] : Her incision is healed.  Stable neurologic exam. [de-identified] : AP lateral lumbar x-rays reveals instrumented L5-S1 fusion

## 2020-11-27 RX ORDER — HYDROMORPHONE HYDROCHLORIDE 4 MG/1
4 TABLET ORAL
Qty: 12 | Refills: 0 | Status: ACTIVE | COMMUNITY
Start: 2020-11-27 | End: 1900-01-01

## 2020-11-30 RX ORDER — OXYCODONE 5 MG/1
5 TABLET ORAL EVERY 4 HOURS
Qty: 60 | Refills: 0 | Status: ACTIVE | COMMUNITY
Start: 2020-11-30 | End: 1900-01-01

## 2020-12-02 ENCOUNTER — NON-APPOINTMENT (OUTPATIENT)
Age: 25
End: 2020-12-02

## 2020-12-10 ENCOUNTER — TRANSCRIPTION ENCOUNTER (OUTPATIENT)
Age: 25
End: 2020-12-10

## 2020-12-15 RX ORDER — HYDROMORPHONE HYDROCHLORIDE 4 MG/1
4 TABLET ORAL
Qty: 28 | Refills: 0 | Status: ACTIVE | COMMUNITY
Start: 2020-12-15 | End: 1900-01-01

## 2021-03-11 ENCOUNTER — NON-APPOINTMENT (OUTPATIENT)
Age: 26
End: 2021-03-11

## 2021-05-28 NOTE — PROGRESS NOTE ADULT - THIS PATIENT HAS THE FOLLOWING CONDITION(S)/DIAGNOSES ON THIS ADMISSION:
Reason for Call:  Other appointment    Detailed comments:  Pain in right knee and she wants to see if he has any avility.    Phone Number Patient can be reached at: Cell number on file:    Telephone Information:   Mobile [phone]       Best Time:  Patient will be out of town until Tuesday, so call her then or today before 10 am    Can we leave a detailed message on this number? YES    Call taken on 5/28/2021 at 8:04 AM by Martita Daily       None

## 2021-08-24 ENCOUNTER — TRANSCRIPTION ENCOUNTER (OUTPATIENT)
Age: 26
End: 2021-08-24

## 2021-09-13 ENCOUNTER — TRANSCRIPTION ENCOUNTER (OUTPATIENT)
Age: 26
End: 2021-09-13

## 2021-10-22 ENCOUNTER — TRANSCRIPTION ENCOUNTER (OUTPATIENT)
Age: 26
End: 2021-10-22

## 2022-01-24 NOTE — PROGRESS NOTE BEHAVIORAL HEALTH - ORIENTED TO TIME
CHIEF COMPLAINT:  Chief Complaint   Patient presents with   • Abdominal Pain         HPI  Patsy Marquez is a 20 year old female with past medical history of dysmenorrhea who presents to the emergency department for lower abdominal and pelvic pain.  Patient states onset of pain this morning while she was urinating.  She feels like she has a large pressure in her lower abdomen into her pelvis.  She states pain is 5/10 in intensity at rest and worsens when she sits upright or moves her legs.  She denies any associated fever chills.  Denies any upper abdominal pain.  No flank pain.  Denies any dysuria, hematuria.  Denies any vaginal bleeding or discharge.  She is sexually active with 1 partner.  Most recently sexually active last night She tested positive for chlamydia in November which was treated.  She does have a Kyleena IUD in place.  Denies any nausea or vomiting.  Last bowel movement was yesterday and normal.  No diarrhea or blood in her stool.      ALLERGIES:  ALLERGIES:   Allergen Reactions   • Seasonal Other (See Comments)       CURRENT MEDICATIONS:  No current facility-administered medications for this encounter.     Current Outpatient Medications   Medication Sig Dispense Refill   • levonorgestrel (KYLEENA) 19.5 MG intrauterine device 19.5 mg by Intrauterine route.         PAST MEDICAL HISTORY:  Past Medical History:   Diagnosis Date   • Chlamydia infection 11/02/2021       SURGICAL HISTORY:  History reviewed. No pertinent surgical history.    SOCIAL HISTORY:  Social History     Tobacco Use   • Smoking status: Never Smoker   • Smokeless tobacco: Never Used   Vaping Use   • Vaping Use: Every day   Substance Use Topics   • Alcohol use: No   • Drug use: No       REVIEW OF SYSTEMS:  Negative not pertinent non-contributory for all remaining 13 systems other than stated above.       PHYSICAL EXAM:  ED Triage Vitals   BP    Pulse    Resp    Temp    SpO2      Vitals reviewed per nursing notes.    Physical  Exam  Constitutional:       General: She is not in acute distress.     Appearance: She is well-developed. She is not ill-appearing, toxic-appearing or diaphoretic.   HENT:      Head: Normocephalic and atraumatic.      Mouth/Throat:      Mouth: Mucous membranes are moist.      Pharynx: Oropharynx is clear.   Eyes:      Extraocular Movements: Extraocular movements intact.      Pupils: Pupils are equal, round, and reactive to light.   Cardiovascular:      Rate and Rhythm: Normal rate and regular rhythm.      Heart sounds: Normal heart sounds. No murmur heard.    No friction rub. No gallop.   Pulmonary:      Effort: Pulmonary effort is normal. No respiratory distress.      Breath sounds: Normal breath sounds. No stridor. No wheezing, rhonchi or rales.   Chest:      Chest wall: No tenderness.   Abdominal:      General: Abdomen is flat. Bowel sounds are normal. There is no distension. There are no signs of injury.      Palpations: Abdomen is soft. There is no shifting dullness, hepatomegaly or mass.      Tenderness: There is abdominal tenderness (Mild suprapubic tenderness without rebound or guarding) in the suprapubic area. There is no guarding or rebound. Negative signs include Garcia's sign, Rovsing's sign, McBurney's sign and psoas sign.      Hernia: No hernia is present.   Genitourinary:     Vagina: Normal. No signs of injury. No vaginal discharge, erythema or bleeding.      Cervix: Discharge (thin white yellow discharge) present. No cervical motion tenderness or friability.      Uterus: Normal. Not enlarged and not tender.       Adnexa: Right adnexa normal and left adnexa normal.        Right: No mass, tenderness or fullness.          Left: No mass, tenderness or fullness.        Comments: IUD strings visualized.   Skin:     General: Skin is warm and dry.      Capillary Refill: Capillary refill takes less than 2 seconds.   Neurological:      General: No focal deficit present.      Mental Status: She is alert and  oriented to person, place, and time.      Cranial Nerves: No cranial nerve deficit.      Motor: No weakness.   Psychiatric:         Mood and Affect: Mood normal.         Behavior: Behavior normal.            Lab Results  Results for orders placed or performed during the hospital encounter of 01/24/22   Comprehensive Metabolic Panel   Result Value    Fasting Status     Sodium 139    Potassium 4.0     Comment: Slight to moderate hemolysis, result may be falsely increased.    Chloride 105    Carbon Dioxide 23    Anion Gap 15    Glucose 88    BUN 12    Creatinine 0.69    Glomerular Filtration Rate >90     Comment: eGFR results = or >60 mL/min/1.73m2 = Normal kidney function. Estimated GFR calculated using the 2009 CKD-EPI creatinine equation.      BUN/ Creatinine Ratio 17    Calcium 8.5    Bilirubin, Total 0.7    GOT/AST 23     Comment: Slight to moderate hemolysis, result may be falsely increased.    GPT/ALT 18    Alkaline Phosphatase 68    Albumin 3.7    Protein, Total 7.1    Globulin 3.4    A/G Ratio 1.1   Urinalysis & Reflex Microscopy With Culture If Indicated   Result Value    COLOR, URINALYSIS Straw    APPEARANCE, URINALYSIS Clear    GLUCOSE, URINALYSIS Negative    BILIRUBIN, URINALYSIS Negative    KETONES, URINALYSIS Trace (A)    SPECIFIC GRAVITY, URINALYSIS 1.005    OCCULT BLOOD, URINALYSIS Negative    PH, URINALYSIS 5.0    PROTEIN, URINALYSIS Negative    UROBILINOGEN, URINALYSIS 0.2    NITRITE, URINALYSIS Negative    LEUKOCYTE ESTERASE, URINALYSIS Negative   WET MOUNT   Result Value    CLUE CELLS, WET MOUNT None Seen    TRICHOMONAS, WET MOUNT None Seen    YEAST, WET MOUNT None Seen   CBC with Automated Differential (performable only)   Result Value    WBC 8.9    RBC 4.61    HGB 13.1    HCT 40.6    MCV 88.1    MCH 28.4    MCHC 32.3    RDW-CV 13.6    RDW-SD 43.8        NRBC 0    Neutrophil, Percent 59    Lymphocytes, Percent 29    Mono, Percent 10    Eosinophils, Percent 1    Basophils, Percent 1     Immature Granulocytes 0    Absolute Neutrophils 5.2    Absolute Lymphocytes 2.6    Absolute Monocytes 0.9    Absolute Eosinophils  0.1    Absolute Basophils 0.1    Absolute Immmature Granulocytes 0.0   HCG POC   Result Value    HCG, URINE - POINT OF CARE Negative       Radiology  No orders to display       Last Vital Signs  Vitals:    01/24/22 0824 01/24/22 0954 01/24/22 1000 01/24/22 1024   BP: 107/63 102/57 109/64 103/65   BP Location:       Patient Position:       Pulse: 76 77 77 79   Resp:   14    Temp:       TempSrc:       SpO2: 99% 97% 99% 98%   Weight:       Height:       LMP: 10/25/2021       Treatment in ED:  ED Medication Orders (From admission, onward)    Ordered Start     Status Ordering Provider    01/24/22 0829 01/24/22 0830  sodium chloride (NORMAL SALINE) 0.9 % bolus 1,000 mL  ONCE         Last MAR action: Completed JUAN CARLOS SAMANIEGO                   Diagnosis:    1. Bilateral lower abdominal cramping         MDM:  Patsy Marquez is a 20 year old female with past medical history of dysmenorrhea who presents to the emergency department for lower abdominal and pelvic pain.  See full history and physical exam above.  Patient is afebrile stable vital signs.  Complaining of lower abdominal pelvic pain.    Pelvic exam performed.  No visualized bleeding.  Nontender cervix ovaries.  Mild amount of thin yellow discharge.  Wet mount and GC chlamydia were obtained.  Lab work was obtained.     Wet mount negative for clue cells, Trichomonas and yeast.  Point of care urine pregnancy is negative.  Urinalysis shows trace amount of ketones but no hematuria or evidence of infection.  CBC is unremarkable.  CMP without any metabolic derangement.  Patient was provided with 1 L of normal saline for hydration.  Re-evaluated patient reports improvement of her symptoms.  I doubt any ovarian torsion.  Recommended close follow-up with her gyn provider.  Patient was discharged home in stable condition.  Reasons to return  emergency department were discussed.  She is agreeable plan all questions were answered.  Disposition:  Discharge  1/24/2022 10:25 AM    Patsy ELIGIO [place]ota discharge to home/self care.        Follow Up:  Jacki Roque CNM  [address] [place] [address]  [phone]               Please understand this is a provisional diagnosis that can and may change. The diagnosis that you are discharged with is based on the symptoms you described and the diagnostic information available today. If any new symptoms occur or worsen, you should seek immediate re-evaluation at the nearest ED. If any symptoms persist, please follow up for reassessment.    Treatment:     Summary of your Discharge Medications      You have not been prescribed any medications.          ABHI Kurtz  01/24/22 1102     Yes

## 2022-02-23 ENCOUNTER — TRANSCRIPTION ENCOUNTER (OUTPATIENT)
Age: 27
End: 2022-02-23

## 2022-02-28 ENCOUNTER — TRANSCRIPTION ENCOUNTER (OUTPATIENT)
Age: 27
End: 2022-02-28

## 2022-04-11 ENCOUNTER — TRANSCRIPTION ENCOUNTER (OUTPATIENT)
Age: 27
End: 2022-04-11

## 2022-06-01 ENCOUNTER — APPOINTMENT (OUTPATIENT)
Dept: CARDIOLOGY | Facility: CLINIC | Age: 27
End: 2022-06-01
Payer: COMMERCIAL

## 2022-06-01 ENCOUNTER — NON-APPOINTMENT (OUTPATIENT)
Age: 27
End: 2022-06-01

## 2022-06-01 VITALS
DIASTOLIC BLOOD PRESSURE: 89 MMHG | OXYGEN SATURATION: 96 % | HEIGHT: 62 IN | SYSTOLIC BLOOD PRESSURE: 127 MMHG | HEART RATE: 83 BPM

## 2022-06-01 DIAGNOSIS — Z01.810 ENCOUNTER FOR PREPROCEDURAL CARDIOVASCULAR EXAMINATION: ICD-10-CM

## 2022-06-01 PROCEDURE — 99203 OFFICE O/P NEW LOW 30 MIN: CPT

## 2022-06-01 PROCEDURE — 93000 ELECTROCARDIOGRAM COMPLETE: CPT | Mod: NC

## 2022-06-01 RX ORDER — DIAZEPAM 10 MG/1
10 TABLET ORAL TWICE DAILY
Qty: 30 | Refills: 0 | Status: DISCONTINUED | COMMUNITY
Start: 2020-10-29 | End: 2022-06-01

## 2022-06-05 ENCOUNTER — EMERGENCY (EMERGENCY)
Facility: HOSPITAL | Age: 27
LOS: 1 days | Discharge: HOME | End: 2022-06-05
Attending: EMERGENCY MEDICINE | Admitting: EMERGENCY MEDICINE
Payer: COMMERCIAL

## 2022-06-05 VITALS
OXYGEN SATURATION: 100 % | HEIGHT: 62 IN | HEART RATE: 105 BPM | TEMPERATURE: 98 F | WEIGHT: 156.97 LBS | SYSTOLIC BLOOD PRESSURE: 123 MMHG | DIASTOLIC BLOOD PRESSURE: 81 MMHG | RESPIRATION RATE: 16 BRPM

## 2022-06-05 VITALS
SYSTOLIC BLOOD PRESSURE: 118 MMHG | TEMPERATURE: 98 F | OXYGEN SATURATION: 98 % | RESPIRATION RATE: 18 BRPM | DIASTOLIC BLOOD PRESSURE: 83 MMHG | HEART RATE: 99 BPM

## 2022-06-05 DIAGNOSIS — Z98.89 OTHER SPECIFIED POSTPROCEDURAL STATES: Chronic | ICD-10-CM

## 2022-06-05 LAB
ALBUMIN SERPL ELPH-MCNC: 3.2 G/DL — LOW (ref 3.3–5)
ALP SERPL-CCNC: 41 U/L — SIGNIFICANT CHANGE UP (ref 30–120)
ALT FLD-CCNC: 14 U/L DA — SIGNIFICANT CHANGE UP (ref 10–60)
ANION GAP SERPL CALC-SCNC: 3 MMOL/L — LOW (ref 5–17)
APTT BLD: 36.8 SEC — HIGH (ref 27.5–35.5)
AST SERPL-CCNC: 12 U/L — SIGNIFICANT CHANGE UP (ref 10–40)
BASOPHILS # BLD AUTO: 0.03 K/UL — SIGNIFICANT CHANGE UP (ref 0–0.2)
BASOPHILS NFR BLD AUTO: 0.4 % — SIGNIFICANT CHANGE UP (ref 0–2)
BILIRUB SERPL-MCNC: 0.3 MG/DL — SIGNIFICANT CHANGE UP (ref 0.2–1.2)
BUN SERPL-MCNC: 8 MG/DL — SIGNIFICANT CHANGE UP (ref 7–23)
CALCIUM SERPL-MCNC: 8.9 MG/DL — SIGNIFICANT CHANGE UP (ref 8.4–10.5)
CHLORIDE SERPL-SCNC: 103 MMOL/L — SIGNIFICANT CHANGE UP (ref 96–108)
CO2 SERPL-SCNC: 32 MMOL/L — HIGH (ref 22–31)
CREAT SERPL-MCNC: 0.58 MG/DL — SIGNIFICANT CHANGE UP (ref 0.5–1.3)
EGFR: 129 ML/MIN/1.73M2 — SIGNIFICANT CHANGE UP
EOSINOPHIL # BLD AUTO: 0.12 K/UL — SIGNIFICANT CHANGE UP (ref 0–0.5)
EOSINOPHIL NFR BLD AUTO: 1.5 % — SIGNIFICANT CHANGE UP (ref 0–6)
GLUCOSE SERPL-MCNC: 106 MG/DL — HIGH (ref 70–99)
HCG SERPL-ACNC: <1 MIU/ML — SIGNIFICANT CHANGE UP
HCT VFR BLD CALC: 23.1 % — LOW (ref 34.5–45)
HGB BLD-MCNC: 7.4 G/DL — LOW (ref 11.5–15.5)
IMM GRANULOCYTES NFR BLD AUTO: 0.7 % — SIGNIFICANT CHANGE UP (ref 0–1.5)
INR BLD: 0.97 RATIO — SIGNIFICANT CHANGE UP (ref 0.88–1.16)
LYMPHOCYTES # BLD AUTO: 2.97 K/UL — SIGNIFICANT CHANGE UP (ref 1–3.3)
LYMPHOCYTES # BLD AUTO: 36.8 % — SIGNIFICANT CHANGE UP (ref 13–44)
MCHC RBC-ENTMCNC: 30.6 PG — SIGNIFICANT CHANGE UP (ref 27–34)
MCHC RBC-ENTMCNC: 32 GM/DL — SIGNIFICANT CHANGE UP (ref 32–36)
MCV RBC AUTO: 95.5 FL — SIGNIFICANT CHANGE UP (ref 80–100)
MONOCYTES # BLD AUTO: 0.51 K/UL — SIGNIFICANT CHANGE UP (ref 0–0.9)
MONOCYTES NFR BLD AUTO: 6.3 % — SIGNIFICANT CHANGE UP (ref 2–14)
NEUTROPHILS # BLD AUTO: 4.39 K/UL — SIGNIFICANT CHANGE UP (ref 1.8–7.4)
NEUTROPHILS NFR BLD AUTO: 54.3 % — SIGNIFICANT CHANGE UP (ref 43–77)
NRBC # BLD: 0 /100 WBCS — SIGNIFICANT CHANGE UP (ref 0–0)
PLATELET # BLD AUTO: 279 K/UL — SIGNIFICANT CHANGE UP (ref 150–400)
POTASSIUM SERPL-MCNC: 3.1 MMOL/L — LOW (ref 3.5–5.3)
POTASSIUM SERPL-SCNC: 3.1 MMOL/L — LOW (ref 3.5–5.3)
PROT SERPL-MCNC: 6.4 G/DL — SIGNIFICANT CHANGE UP (ref 6–8.3)
PROTHROM AB SERPL-ACNC: 11.1 SEC — SIGNIFICANT CHANGE UP (ref 10.5–13.4)
RBC # BLD: 2.42 M/UL — LOW (ref 3.8–5.2)
RBC # FLD: 13.6 % — SIGNIFICANT CHANGE UP (ref 10.3–14.5)
SODIUM SERPL-SCNC: 138 MMOL/L — SIGNIFICANT CHANGE UP (ref 135–145)
WBC # BLD: 8.08 K/UL — SIGNIFICANT CHANGE UP (ref 3.8–10.5)
WBC # FLD AUTO: 8.08 K/UL — SIGNIFICANT CHANGE UP (ref 3.8–10.5)

## 2022-06-05 PROCEDURE — 80053 COMPREHEN METABOLIC PANEL: CPT

## 2022-06-05 PROCEDURE — 86900 BLOOD TYPING SEROLOGIC ABO: CPT

## 2022-06-05 PROCEDURE — 93010 ELECTROCARDIOGRAM REPORT: CPT

## 2022-06-05 PROCEDURE — P9016: CPT

## 2022-06-05 PROCEDURE — 96360 HYDRATION IV INFUSION INIT: CPT | Mod: XU

## 2022-06-05 PROCEDURE — 85025 COMPLETE CBC W/AUTO DIFF WBC: CPT

## 2022-06-05 PROCEDURE — 85610 PROTHROMBIN TIME: CPT

## 2022-06-05 PROCEDURE — 74177 CT ABD & PELVIS W/CONTRAST: CPT | Mod: MA

## 2022-06-05 PROCEDURE — 85730 THROMBOPLASTIN TIME PARTIAL: CPT

## 2022-06-05 PROCEDURE — 71275 CT ANGIOGRAPHY CHEST: CPT | Mod: 26,MA

## 2022-06-05 PROCEDURE — 71275 CT ANGIOGRAPHY CHEST: CPT | Mod: MA

## 2022-06-05 PROCEDURE — 74177 CT ABD & PELVIS W/CONTRAST: CPT | Mod: 26,MA

## 2022-06-05 PROCEDURE — 86850 RBC ANTIBODY SCREEN: CPT

## 2022-06-05 PROCEDURE — 93970 EXTREMITY STUDY: CPT

## 2022-06-05 PROCEDURE — 84702 CHORIONIC GONADOTROPIN TEST: CPT

## 2022-06-05 PROCEDURE — 93005 ELECTROCARDIOGRAM TRACING: CPT

## 2022-06-05 PROCEDURE — 86923 COMPATIBILITY TEST ELECTRIC: CPT

## 2022-06-05 PROCEDURE — 36430 TRANSFUSION BLD/BLD COMPNT: CPT

## 2022-06-05 PROCEDURE — 99285 EMERGENCY DEPT VISIT HI MDM: CPT | Mod: 25

## 2022-06-05 PROCEDURE — 99285 EMERGENCY DEPT VISIT HI MDM: CPT

## 2022-06-05 PROCEDURE — 86901 BLOOD TYPING SEROLOGIC RH(D): CPT

## 2022-06-05 PROCEDURE — 36415 COLL VENOUS BLD VENIPUNCTURE: CPT

## 2022-06-05 PROCEDURE — 93970 EXTREMITY STUDY: CPT | Mod: 26

## 2022-06-05 RX ORDER — POTASSIUM CHLORIDE 20 MEQ
40 PACKET (EA) ORAL ONCE
Refills: 0 | Status: COMPLETED | OUTPATIENT
Start: 2022-06-05 | End: 2022-06-05

## 2022-06-05 RX ORDER — HYDROMORPHONE HYDROCHLORIDE 2 MG/ML
2 INJECTION INTRAMUSCULAR; INTRAVENOUS; SUBCUTANEOUS ONCE
Refills: 0 | Status: DISCONTINUED | OUTPATIENT
Start: 2022-06-05 | End: 2022-06-05

## 2022-06-05 RX ORDER — SODIUM CHLORIDE 9 MG/ML
1000 INJECTION INTRAMUSCULAR; INTRAVENOUS; SUBCUTANEOUS ONCE
Refills: 0 | Status: COMPLETED | OUTPATIENT
Start: 2022-06-05 | End: 2022-06-05

## 2022-06-05 RX ADMIN — SODIUM CHLORIDE 1000 MILLILITER(S): 9 INJECTION INTRAMUSCULAR; INTRAVENOUS; SUBCUTANEOUS at 15:43

## 2022-06-05 RX ADMIN — HYDROMORPHONE HYDROCHLORIDE 2 MILLIGRAM(S): 2 INJECTION INTRAMUSCULAR; INTRAVENOUS; SUBCUTANEOUS at 20:29

## 2022-06-05 RX ADMIN — HYDROMORPHONE HYDROCHLORIDE 2 MILLIGRAM(S): 2 INJECTION INTRAMUSCULAR; INTRAVENOUS; SUBCUTANEOUS at 20:59

## 2022-06-05 RX ADMIN — Medication 40 MILLIEQUIVALENT(S): at 18:02

## 2022-06-05 RX ADMIN — SODIUM CHLORIDE 1000 MILLILITER(S): 9 INJECTION INTRAMUSCULAR; INTRAVENOUS; SUBCUTANEOUS at 16:45

## 2022-06-05 RX ADMIN — HYDROMORPHONE HYDROCHLORIDE 2 MILLIGRAM(S): 2 INJECTION INTRAMUSCULAR; INTRAVENOUS; SUBCUTANEOUS at 15:58

## 2022-06-05 RX ADMIN — HYDROMORPHONE HYDROCHLORIDE 2 MILLIGRAM(S): 2 INJECTION INTRAMUSCULAR; INTRAVENOUS; SUBCUTANEOUS at 16:28

## 2022-06-05 NOTE — ED PROVIDER NOTE - NS ED ATTENDING STATEMENT MOD
This was a shared visit with the DIOGENES. I reviewed and verified the documentation and independently performed the documented:

## 2022-06-05 NOTE — ED PROVIDER NOTE - CARE PLAN
1 Principal Discharge DX:	Anemia  Secondary Diagnosis:	Abdominal pain  Secondary Diagnosis:	Shortness of breath  Secondary Diagnosis:	Ecchymosis   Principal Discharge DX:	Anemia  Goal:	s/p recent abdominoplasty  Secondary Diagnosis:	Abdominal pain  Secondary Diagnosis:	Shortness of breath  Secondary Diagnosis:	Ecchymosis

## 2022-06-05 NOTE — ED PROVIDER NOTE - SKIN, MLM
+ecchymosis noted to B buttocks/hips, right greater than left extending to right proximal thigh, compartments soft B, calf B NT, neg homanns sign, distal pulses and sensation intact, NVI

## 2022-06-05 NOTE — ED PROVIDER NOTE - NSICDXPASTMEDICALHX_GEN_ALL_CORE_FT
PAST MEDICAL HISTORY:  Marysol-Danlos disease     Endometriosis     Fibromyalgia     GERD (gastroesophageal reflux disease)

## 2022-06-05 NOTE — ED ADULT TRIAGE NOTE - CHIEF COMPLAINT QUOTE
"I had abdomoplasty done on Thursday. I have pain & discoloration in my rt thigh & abd & I feel weak. MD told me to come to ED"

## 2022-06-05 NOTE — ED PROVIDER NOTE - NSFOLLOWUPINSTRUCTIONS_ED_ALL_ED_FT
Follow up with your plastic surgeon tomorrow for re-evaluation, ongoing care and treatment. Rest, drink plenty of fluids, continue your pain medications as prescribed. Hold off on taking lovenox tomorrow and resume as directed after discussing with your plastic surgeon tomorrow. If having worsening of symptoms or other related symptoms, RETURN TO THE ER IMMEDIATELY.     Anemia       Anemia is a condition in which there is not enough red blood cells or hemoglobin in the blood. Hemoglobin is a substance in red blood cells that carries oxygen.    When you do not have enough red blood cells or hemoglobin (are anemic), your body cannot get enough oxygen and your organs may not work properly. As a result, you may feel very tired or have other problems.      What are the causes?    Common causes of anemia include:  •Excessive bleeding. Anemia can be caused by excessive bleeding inside or outside the body, including bleeding from the intestines or from heavy menstrual periods in females.      •Poor nutrition.      •Long-lasting (chronic) kidney, thyroid, and liver disease.      •Bone marrow disorders, spleen problems, and blood disorders.      •Cancer and treatments for cancer.      •HIV (human immunodeficiency virus) and AIDS (acquired immunodeficiency syndrome).      •Infections, medicines, and autoimmune disorders that destroy red blood cells.        What are the signs or symptoms?    Symptoms of this condition include:  •Minor weakness.      •Dizziness.      •Headache, or difficulties concentrating and sleeping.      •Heartbeats that feel irregular or faster than normal (palpitations).      •Shortness of breath, especially with exercise.      •Pale skin, lips, and nails, or cold hands and feet.      •Indigestion and nausea.      Symptoms may occur suddenly or develop slowly. If your anemia is mild, you may not have symptoms.      How is this diagnosed?    This condition is diagnosed based on blood tests, your medical history, and a physical exam. In some cases, a test may be needed in which cells are removed from the soft tissue inside of a bone and looked at under a microscope (bone marrow biopsy). Your health care provider may also check your stool (feces) for blood and may do additional testing to look for the cause of your bleeding.    Other tests may include:  •Imaging tests, such as a CT scan or MRI.      •A procedure to see inside your esophagus and stomach (endoscopy).      •A procedure to see inside your colon and rectum (colonoscopy).        How is this treated?    Treatment for this condition depends on the cause. If you continue to lose a lot of blood, you may need to be treated at a hospital. Treatment may include:  •Taking supplements of iron, vitamin B12, or folic acid.      •Taking a hormone medicine (erythropoietin) that can help to stimulate red blood cell growth.      •Having a blood transfusion. This may be needed if you lose a lot of blood.      •Making changes to your diet.      •Having surgery to remove your spleen.        Follow these instructions at home:    •Take over-the-counter and prescription medicines only as told by your health care provider.      •Take supplements only as told by your health care provider.      •Follow any diet instructions that you were given by your health care provider.      •Keep all follow-up visits as told by your health care provider. This is important.        Contact a health care provider if:    •You develop new bleeding anywhere in the body.        Get help right away if:    •You are very weak.      •You are short of breath.      •You have pain in your abdomen or chest.      •You are dizzy or feel faint.      •You have trouble concentrating.      •You have bloody stools, black stools, or tarry stools.      •You vomit repeatedly or you vomit up blood.      These symptoms may represent a serious problem that is an emergency. Do not wait to see if the symptoms will go away. Get medical help right away. Call your local emergency services (911 in the [place] Do not drive yourself to the hospital.       Summary    •Anemia is a condition in which you do not have enough red blood cells or enough of a substance in your red blood cells that carries oxygen (hemoglobin).      •Symptoms may occur suddenly or develop slowly.      •If your anemia is mild, you may not have symptoms.      •This condition is diagnosed with blood tests, a medical history, and a physical exam. Other tests may be needed.      •Treatment for this condition depends on the cause of the anemia.      This information is not intended to replace advice given to you by your health care provider. Make sure you discuss any questions you have with your health care provider.

## 2022-06-05 NOTE — ED PROVIDER NOTE - OBJECTIVE STATEMENT
25 y/o F with hx of fibromyalgia, GERD, Marysol danlos syndrome, chronic back pain/chronic opioid use s/p extensive abdominoplasty by Dr. Pop on 6/2/2022 due to chronic panniculitis and loose hanging skin causing lower back pain, referred by plastic surgeon for evaluation of shortness of breath, abdominal pain, elevated heart rate and calf pain s/p surgery 3 days ago. Pt states that she feels weak and has bruising to her lower back and abdomen extending to thighs. States that she feels like she can't get a full breath since she feels swelling in her abdomen. Denies rectal bleeding, N/V, urinary symptoms.  pcp; Dr. Michelle

## 2022-06-05 NOTE — ED PROVIDER NOTE - CLINICAL SUMMARY MEDICAL DECISION MAKING FREE TEXT BOX
pt referred by plastic surgeon for sob, abd pain, calf pain, elev HR s/p adbominoplasty on 6/2. pt relates feels like cant take full breath in due to swelling in abdomen.   plan - ekg/ct/us/labs/ivf pt referred by plastic surgeon for sob, abd pain, calf pain, elev HR s/p abdominoplasty on 6/2. pt relates feels like cant take full breath in due to swelling in abdomen. entire history and physical with female LINNETTE brito present  plan - ekg/ct/us/labs/ivf

## 2022-06-05 NOTE — ED PROVIDER NOTE - PATIENT PORTAL LINK FT
You can access the FollowMyHealth Patient Portal offered by [facility] by registering at the following website: [email or web address]. By joining [facility]’s FollowMyHealth portal, you will also be able to view your health information using other applications (apps) compatible with our system.

## 2022-06-05 NOTE — ED PROVIDER NOTE - CARE PROVIDER_API CALL
Hiwot Pop (DO)  Plastic Surgery Surgery  [address] [place] [address]  Phone: [phone]  Fax: [phone]  Follow Up Time: 1-3 Days

## 2022-06-05 NOTE — ED PROVIDER NOTE - PROGRESS NOTE DETAILS
Spoke with plastic surgeon, Dr. Pop Reevaluated patient at bedside.  Patient feeling much improved.  Discussed the results of all diagnostic testing in ED and copies of all reports given. Advised f/u with plastic surgeon tomorrow, hold off lovenox tomorrow. strict return precautions given.  An opportunity to ask questions was given.  Discussed the importance of prompt, close medical follow-up.  Patient will return with any changes, concerns or persistent / worsening symptoms.  Understanding of all instructions verbalized. Spoke with plastic surgeon, Dr. Pop, discussed lab/imaging results, advised can give one unit prbc, hold lovenox dose tomorrow, can d/c home and has appt to f/u in office tomorrow.

## 2022-06-06 ENCOUNTER — INPATIENT (INPATIENT)
Facility: HOSPITAL | Age: 27
LOS: 0 days | Discharge: LEFT BEFORE DISCHARGE | DRG: 378 | End: 2022-06-07
Attending: HOSPITALIST | Admitting: HOSPITALIST
Payer: COMMERCIAL

## 2022-06-06 VITALS
HEIGHT: 62 IN | TEMPERATURE: 98 F | HEART RATE: 107 BPM | RESPIRATION RATE: 18 BRPM | SYSTOLIC BLOOD PRESSURE: 124 MMHG | WEIGHT: 158.07 LBS | DIASTOLIC BLOOD PRESSURE: 79 MMHG | OXYGEN SATURATION: 100 %

## 2022-06-06 DIAGNOSIS — Z98.89 OTHER SPECIFIED POSTPROCEDURAL STATES: Chronic | ICD-10-CM

## 2022-06-06 LAB
ALBUMIN SERPL ELPH-MCNC: 3.7 G/DL — SIGNIFICANT CHANGE UP (ref 3.3–5)
ALP SERPL-CCNC: 49 U/L — SIGNIFICANT CHANGE UP (ref 40–120)
ALT FLD-CCNC: 6 U/L — LOW (ref 10–45)
ANION GAP SERPL CALC-SCNC: 11 MMOL/L — SIGNIFICANT CHANGE UP (ref 5–17)
APTT BLD: 37.5 SEC — HIGH (ref 27.5–35.5)
AST SERPL-CCNC: 15 U/L — SIGNIFICANT CHANGE UP (ref 10–40)
BASOPHILS # BLD AUTO: 0.02 K/UL — SIGNIFICANT CHANGE UP (ref 0–0.2)
BASOPHILS NFR BLD AUTO: 0.3 % — SIGNIFICANT CHANGE UP (ref 0–2)
BILIRUB SERPL-MCNC: 0.4 MG/DL — SIGNIFICANT CHANGE UP (ref 0.2–1.2)
BLD GP AB SCN SERPL QL: NEGATIVE — SIGNIFICANT CHANGE UP
BUN SERPL-MCNC: 6 MG/DL — LOW (ref 7–23)
CA-I BLD-SCNC: 1.22 MMOL/L — SIGNIFICANT CHANGE UP (ref 1.15–1.33)
CALCIUM SERPL-MCNC: 9.4 MG/DL — SIGNIFICANT CHANGE UP (ref 8.4–10.5)
CHLORIDE SERPL-SCNC: 99 MMOL/L — SIGNIFICANT CHANGE UP (ref 96–108)
CO2 SERPL-SCNC: 29 MMOL/L — SIGNIFICANT CHANGE UP (ref 22–31)
CREAT SERPL-MCNC: 0.54 MG/DL — SIGNIFICANT CHANGE UP (ref 0.5–1.3)
EGFR: 131 ML/MIN/1.73M2 — SIGNIFICANT CHANGE UP
EOSINOPHIL # BLD AUTO: 0.2 K/UL — SIGNIFICANT CHANGE UP (ref 0–0.5)
EOSINOPHIL NFR BLD AUTO: 2.6 % — SIGNIFICANT CHANGE UP (ref 0–6)
GLUCOSE SERPL-MCNC: 106 MG/DL — HIGH (ref 70–99)
HCT VFR BLD CALC: 23.8 % — LOW (ref 34.5–45)
HCT VFR BLD CALC: 26.9 % — LOW (ref 34.5–45)
HGB BLD-MCNC: 7.9 G/DL — LOW (ref 11.5–15.5)
HGB BLD-MCNC: 8.6 G/DL — LOW (ref 11.5–15.5)
IMM GRANULOCYTES NFR BLD AUTO: 1.2 % — SIGNIFICANT CHANGE UP (ref 0–1.5)
INR BLD: 0.99 RATIO — SIGNIFICANT CHANGE UP (ref 0.88–1.16)
LIDOCAIN IGE QN: 29 U/L — SIGNIFICANT CHANGE UP (ref 7–60)
LYMPHOCYTES # BLD AUTO: 2.32 K/UL — SIGNIFICANT CHANGE UP (ref 1–3.3)
LYMPHOCYTES # BLD AUTO: 30.3 % — SIGNIFICANT CHANGE UP (ref 13–44)
MCHC RBC-ENTMCNC: 30.2 PG — SIGNIFICANT CHANGE UP (ref 27–34)
MCHC RBC-ENTMCNC: 31.6 PG — SIGNIFICANT CHANGE UP (ref 27–34)
MCHC RBC-ENTMCNC: 32 GM/DL — SIGNIFICANT CHANGE UP (ref 32–36)
MCHC RBC-ENTMCNC: 33.2 GM/DL — SIGNIFICANT CHANGE UP (ref 32–36)
MCV RBC AUTO: 94.4 FL — SIGNIFICANT CHANGE UP (ref 80–100)
MCV RBC AUTO: 95.2 FL — SIGNIFICANT CHANGE UP (ref 80–100)
MONOCYTES # BLD AUTO: 0.44 K/UL — SIGNIFICANT CHANGE UP (ref 0–0.9)
MONOCYTES NFR BLD AUTO: 5.7 % — SIGNIFICANT CHANGE UP (ref 2–14)
NEUTROPHILS # BLD AUTO: 4.59 K/UL — SIGNIFICANT CHANGE UP (ref 1.8–7.4)
NEUTROPHILS NFR BLD AUTO: 59.9 % — SIGNIFICANT CHANGE UP (ref 43–77)
NRBC # BLD: 0 /100 WBCS — SIGNIFICANT CHANGE UP (ref 0–0)
NRBC # BLD: 0 /100 WBCS — SIGNIFICANT CHANGE UP (ref 0–0)
PLATELET # BLD AUTO: 300 K/UL — SIGNIFICANT CHANGE UP (ref 150–400)
PLATELET # BLD AUTO: 313 K/UL — SIGNIFICANT CHANGE UP (ref 150–400)
POTASSIUM SERPL-MCNC: 4 MMOL/L — SIGNIFICANT CHANGE UP (ref 3.5–5.3)
POTASSIUM SERPL-SCNC: 4 MMOL/L — SIGNIFICANT CHANGE UP (ref 3.5–5.3)
PROT SERPL-MCNC: 6.4 G/DL — SIGNIFICANT CHANGE UP (ref 6–8.3)
PROTHROM AB SERPL-ACNC: 11.4 SEC — SIGNIFICANT CHANGE UP (ref 10.5–13.4)
RBC # BLD: 2.5 M/UL — LOW (ref 3.8–5.2)
RBC # BLD: 2.85 M/UL — LOW (ref 3.8–5.2)
RBC # FLD: 14.3 % — SIGNIFICANT CHANGE UP (ref 10.3–14.5)
RBC # FLD: 14.3 % — SIGNIFICANT CHANGE UP (ref 10.3–14.5)
RH IG SCN BLD-IMP: POSITIVE — SIGNIFICANT CHANGE UP
SARS-COV-2 RNA SPEC QL NAA+PROBE: SIGNIFICANT CHANGE UP
SODIUM SERPL-SCNC: 139 MMOL/L — SIGNIFICANT CHANGE UP (ref 135–145)
WBC # BLD: 7.66 K/UL — SIGNIFICANT CHANGE UP (ref 3.8–10.5)
WBC # BLD: 9.03 K/UL — SIGNIFICANT CHANGE UP (ref 3.8–10.5)
WBC # FLD AUTO: 7.66 K/UL — SIGNIFICANT CHANGE UP (ref 3.8–10.5)
WBC # FLD AUTO: 9.03 K/UL — SIGNIFICANT CHANGE UP (ref 3.8–10.5)

## 2022-06-06 PROCEDURE — 99285 EMERGENCY DEPT VISIT HI MDM: CPT

## 2022-06-06 PROCEDURE — 74177 CT ABD & PELVIS W/CONTRAST: CPT | Mod: 26,MA

## 2022-06-06 PROCEDURE — 71045 X-RAY EXAM CHEST 1 VIEW: CPT | Mod: 26

## 2022-06-06 RX ORDER — HYDROMORPHONE HYDROCHLORIDE 2 MG/ML
0.5 INJECTION INTRAMUSCULAR; INTRAVENOUS; SUBCUTANEOUS ONCE
Refills: 0 | Status: DISCONTINUED | OUTPATIENT
Start: 2022-06-06 | End: 2022-06-06

## 2022-06-06 RX ORDER — PANTOPRAZOLE SODIUM 20 MG/1
80 TABLET, DELAYED RELEASE ORAL ONCE
Refills: 0 | Status: COMPLETED | OUTPATIENT
Start: 2022-06-06 | End: 2022-06-06

## 2022-06-06 RX ORDER — HYDROMORPHONE HYDROCHLORIDE 2 MG/ML
1 INJECTION INTRAMUSCULAR; INTRAVENOUS; SUBCUTANEOUS ONCE
Refills: 0 | Status: DISCONTINUED | OUTPATIENT
Start: 2022-06-06 | End: 2022-06-06

## 2022-06-06 RX ORDER — DIAZEPAM 5 MG
5 TABLET ORAL ONCE
Refills: 0 | Status: DISCONTINUED | OUTPATIENT
Start: 2022-06-06 | End: 2022-06-06

## 2022-06-06 RX ORDER — SODIUM CHLORIDE 9 MG/ML
500 INJECTION INTRAMUSCULAR; INTRAVENOUS; SUBCUTANEOUS ONCE
Refills: 0 | Status: COMPLETED | OUTPATIENT
Start: 2022-06-06 | End: 2022-06-06

## 2022-06-06 RX ADMIN — SODIUM CHLORIDE 500 MILLILITER(S): 9 INJECTION INTRAMUSCULAR; INTRAVENOUS; SUBCUTANEOUS at 18:48

## 2022-06-06 RX ADMIN — HYDROMORPHONE HYDROCHLORIDE 1 MILLIGRAM(S): 2 INJECTION INTRAMUSCULAR; INTRAVENOUS; SUBCUTANEOUS at 22:36

## 2022-06-06 RX ADMIN — Medication 5 MILLIGRAM(S): at 21:36

## 2022-06-06 RX ADMIN — HYDROMORPHONE HYDROCHLORIDE 0.5 MILLIGRAM(S): 2 INJECTION INTRAMUSCULAR; INTRAVENOUS; SUBCUTANEOUS at 18:45

## 2022-06-06 RX ADMIN — HYDROMORPHONE HYDROCHLORIDE 0.5 MILLIGRAM(S): 2 INJECTION INTRAMUSCULAR; INTRAVENOUS; SUBCUTANEOUS at 20:00

## 2022-06-06 RX ADMIN — HYDROMORPHONE HYDROCHLORIDE 1 MILLIGRAM(S): 2 INJECTION INTRAMUSCULAR; INTRAVENOUS; SUBCUTANEOUS at 21:10

## 2022-06-06 NOTE — ED ADULT NURSE REASSESSMENT NOTE - NS ED NURSE REASSESS COMMENT FT1
pt ambulated to bathroom, pt aware of need for urine sample,  accompanied pt to bathroom, pending dispo

## 2022-06-06 NOTE — ED PROVIDER NOTE - CLINICAL SUMMARY MEDICAL DECISION MAKING FREE TEXT BOX
Nancy: presents with dizziness and signs of anemia. has abd plasty and had transfusion yesterday. more bruising today. matias large melanotic stool. Will follow hct and transfuse. will need admission,.

## 2022-06-06 NOTE — ED PROVIDER NOTE - RAPID ASSESSMENT
26 F w/ PMHx of Stage-4 endometriosis and fibromyalgia presents to ED for a blood transfusion s/p abdominoplasty on 6/2/22. Reports leg numbness/tingling, R upper thigh hematoma, R gluteal bruising, fever, and abdominal pain. Pt was seen at [facility] ED yesterday, was found to be anemic, and received a 1 unit blood transfusion yesterday. Denies past complications with transfusions. Endorses Lovenox x 3 days, stopped yesterday. Denies AC use. Allergic to penicillin and sulfa.    **Pt seen in the waiting room via teletriage by Claude Fishman), documentation completed by Amol Lay. Pt to be sent to main ED for further evaluation - all orders placed to be followed by MD in the main ED**  Scribe Statement: Bk BROWN Cole (scribe), attest that this documentation has been prepared under the direction and in the presence of Claude Fihsman) 26 F w/ PMHx of Stage-4 endometriosis and fibromyalgia presents to ED for a blood transfusion s/p abdominoplasty on 6/2/22. Reports leg numbness/tingling, R upper thigh hematoma, R gluteal bruising, fever, and abdominal pain. Pt was seen at [facility] ED yesterday, was found to be anemic, and received a 1 unit blood transfusion yesterday. Denies past complications with transfusions. Endorses Lovenox x 3 days, stopped yesterday. Denies AC use. Allergic to penicillin and sulfa.    **Pt seen in the waiting room via teletriage by Claude Fishman (MD), documentation completed by Ricardo -Amol Bourgeois. Pt to be sent to main ED for further evaluation - all orders placed to be followed by MD in the main ED**  Scribe Statement: Bk BROWN Cole (scribe), attest that this documentation has been prepared under the direction and in the presence of Claude Fishman)  Attending Note --     I saw the patient waiting area via televideo connection; a brief history was taken and a thorough physical exam was not performed as there is no physical exam room available.  The patient will be seen and further worked up in the main emergency department and their care will be completed by the main emergency department team.  I was not involved in this patient's care during the [facility]DOC process, and unless otherwise noted in the ED provider note, was not involved in their care during their ED course.    The scribe's documentation has been prepared under my direction and personally reviewed by me in its entirety. I confirm that the note above accurately reflects all work, treatment, procedures, and medical decision making performed by me  Dr. Fishman

## 2022-06-06 NOTE — ED ADULT TRIAGE NOTE - CHIEF COMPLAINT QUOTE
abd pain surgery  6/2  [facility] at [place]   had blood transfusion Pt c/o feeling fatigue legs ting deysi ALEGRIA NEG Has seen surgeon today Dr Hiwot Pop Rt thigh swollen

## 2022-06-06 NOTE — ED PROVIDER NOTE - ATTENDING APP SHARED VISIT CONTRIBUTION OF CARE
I performed a history and physical exam of the patient and discussed their management with the resident and /or advanced care provider. I reviewed the resident and /or ACP's note and agree with the documented findings and plan of care. My medical decision making and observations are found above.  Lungs clear, abd soft but tender. legs with bruising but soft comartments

## 2022-06-06 NOTE — ED ADULT NURSE NOTE - OBJECTIVE STATEMENT
27 y/o F status post abdominoplasty secondary to endometriosis. Presenting today with pain and drop in hemoglobin, and episodes of dark stools. Patient arrived with abdominal binder in place and two LAURITA drains. 25 y/o F status post abdominoplasty secondary to endometriosis. Presenting today with pain and drop in hemoglobin, and episodes of dark stools. Patient arrived with abdominal binder in place and two LAURITA drains. pt denies CP, SOB, nausea/vomiting, numbness/tingling, fever, cough, chills, dizziness, headache, blurred vision, neuro intact. pt a&ox3, lung sounds clear, heart rate regular, abdomen soft nontender distended to palp. skin intact at site of LAURITA drains. Will continue to monitor and assess while offering support and reassurance.

## 2022-06-06 NOTE — ED PROVIDER NOTE - OBJECTIVE STATEMENT
Abdominoplasty 6/2 by Dr. Haily Pop - plastic surgeon as outpt.  spoke with Dr. Pop - seen  today in office.  pt reported to her black stools.  Lovenox d/c'd yesterday.  seen at [facility] ED yesterday - CT abdo/pelvis and US of LExt neg.  rec'd one unit PRBCs.  Dr. Pop request call to cell after evaluation [phone].  pt may need repeat US of thigh to evaluate for hematoma and poss stool guiac in addition to labs - PJU - QDoc. Abdominoplasty 6/2 by Dr. Haily Pop - plastic surgeon as outpt.  spoke with Dr. Pop - seen  today in office.  pt reported to her black stools.  Lovenox d/c'd yesterday.  seen at [facility] ED yesterday - CT abdo/pelvis and US of LExt neg.  rec'd one unit PRBCs.  Dr. Pop request call to cell after evaluation [phone].  pt may need repeat US of thigh to evaluate for hematoma and poss stool guiac in addition to labs - PJU - QDoc.    Nancy: 26y F with PMHx of Stage-4 endometriosis and fibromyalgia, abdominoplasty on 6/2/22, and had blood transfusion yesterday at [facility], presents to the ED with black tarry stools starting today. Patient notes new bruising on bilateral thighs. Also endorses SOB, dizziness, ringing in the ears, feels that she cannot form coherent sentences.  Last took dose of Lovenox yesterday. Patient is taking oxycodone, Dilaudid and gabapentin for pain. Is on Clindamycin and Cyclobenzaprine. Has been taking iron. 27 y/o female hx lexii danlos, endometriosis on chronic 10 mg oxycodone qid, presents at the advice of her plastic surgeon. Patient is s/p abdominoplasty 6/2 by Dr. Haily Pop.  pt reported to her black stools x 1 today with large volume.  Lovenox d/c'd yesterday.  seen at [facility] ED yesterday - CT abdo/pelvis and US of LExt neg.  rec'd one unit PRBCs.  Dr. Pop request call to cell after evaluation [phone].  pt may need repeat US of thigh to evaluate for hematoma and poss stool guiac in addition to labs - PJU - QDoc.    Nancy: 26y F with PMHx of Stage-4 endometriosis and fibromyalgia, abdominoplasty on 6/2/22, and had blood transfusion yesterday at [facility], presents to the ED with black tarry stools starting today. Patient notes new bruising on bilateral thighs. Also endorses SOB, dizziness, ringing in the ears, feels that she cannot form coherent sentences.  Last took dose of Lovenox yesterday. Patient is taking oxycodone, Dilaudid and gabapentin for pain. Is on Clindamycin and Cyclobenzaprine. Has been taking iron.    ACP: also noted to have 1 isolated episode of fever 2 nights ago, tmax 100.6. no vomiting. reports only single episode of black stool however has photo and reports it was very large volume. she feels weak and lightheaded without syncope. notes that abd pain takes her breath away it is so severe. taking her chronic oxy 10 QID and also taking tylenol. reports taking 1-2 mg dilaudid BID

## 2022-06-06 NOTE — ED ADULT NURSE NOTE - VOIDING
Hide Cerave Products: No
Action 1: Continue
Continue Regimen: Cleocin and bpo.  Will take minocycline bid this month then taper to qd dosing
Samples Given: Aklief qhs
Detail Level: Zone
without difficulty

## 2022-06-06 NOTE — ED PROVIDER NOTE - PROGRESS NOTE DETAILS
called to patient bedside, reporting severe pain, unchanged by the dilaudid. advised will give escalating dose. patient very concerned about her abdominal pain, advised will repeat cbc and if dropping will rescan but went over r/b of rescan at this time. patient reporting she is agreeable to plan. very difficult to examine patient due to pain- Junaa Newby PA-C Joseph Frankel PGY3: PAtient signed out to me. Briefly, patient with recenbt abdomioplasty presenting with abdominal pain (baseline since surgery) and sob in setting of melanotic stool today with concern for GI bleed. Repeat CT scan is negative for acute surgical process or large hematoma. Patient received a unit of blood for concern of GI bleed. Still with post surgical pain and requesting pain medications which have been redosed. Patient stable for admission. Discussed with Dr. Menon Will admit.

## 2022-06-06 NOTE — ED ADULT NURSE REASSESSMENT NOTE - NS ED NURSE REASSESS COMMENT FT1
report taken from MADYSON Colón, pt assessed, vitals stable, pt attempted to use restroom but states that she was unable to but is unable to provide an explanation why, pt provided with commode to attempt to continue to try to use the restroom while blood transfusion is running, pending dispo

## 2022-06-06 NOTE — ED ADULT NURSE NOTE - NS ED NURSE LEVEL OF CONSCIOUSNESS ORIENTATION
History  Chief Complaint   Patient presents with    Dog Bite     Patient nipped by family dog at home approx 20 minutes ago on left wrist         History provided by:  Patient   used: No      Patient is a 9year-old male presenting to emergency department after family dog nipped right wrist   No active bleeding  Small puncture wound  Family dog  Unsure of rabies vaccine  Tetanus up-to-date  MDM patient's mom aware to watch talk for the next 10 days, will return if anything happens to dog, prophylactic Augmentin      Prior to Admission Medications   Prescriptions Last Dose Informant Patient Reported? Taking? FLOVENT  MCG/ACT inhaler   Yes No   Sig: Inhale 2 puffs 2 (two) times a day   albuterol (2 5 mg/3 mL) 0 083 % nebulizer solution   Yes No   Sig: Albuterol Sulfate (2 5 MG/3ML) 0 083% Inhalation Nebulization Solution  Refills: 0    Smiley Mccarty;  Started 3-Feb-2015 Active   albuterol (PROVENTIL HFA,VENTOLIN HFA) 90 mcg/act inhaler   Yes No   Sig: Inhale 2 puffs as needed for wheezing  omeprazole (PriLOSEC) 20 mg delayed release capsule   No No   Sig: Take 1 capsule (20 mg total) by mouth daily      Facility-Administered Medications: None       Past Medical History:   Diagnosis Date    Asthma        History reviewed  No pertinent surgical history  Family History   Problem Relation Age of Onset    No Known Problems Mother     No Known Problems Father      I have reviewed and agree with the history as documented  Social History   Substance Use Topics    Smoking status: Never Smoker    Smokeless tobacco: Never Used    Alcohol use Not on file        Review of Systems   Constitutional: Negative for activity change, appetite change, fatigue, fever and irritability  HENT: Negative for congestion, drooling, ear pain, rhinorrhea and sore throat  Eyes: Negative for photophobia, pain and discharge     Respiratory: Negative for cough, shortness of breath, wheezing and stridor  Cardiovascular: Negative for chest pain  Gastrointestinal: Negative for diarrhea, nausea and vomiting  Genitourinary: Negative for decreased urine volume  Musculoskeletal: Negative for arthralgias, joint swelling, neck pain and neck stiffness  Skin: Positive for wound  Negative for color change, pallor and rash  Neurological: Negative for syncope, light-headedness and headaches  All other systems reviewed and are negative  Physical Exam  Physical Exam   Constitutional: He appears well-developed and well-nourished  He is active  No distress  HENT:   Head: Atraumatic  No signs of injury  Nose: No nasal discharge  Mouth/Throat: Mucous membranes are moist  No tonsillar exudate  Eyes: EOM are normal  Pupils are equal, round, and reactive to light  Neck: Normal range of motion  Neck supple  Cardiovascular: Normal rate and regular rhythm  Pulses are palpable  Pulmonary/Chest: Effort normal and breath sounds normal  No respiratory distress  He exhibits no retraction  Abdominal: Soft  Bowel sounds are normal  There is no tenderness  Musculoskeletal: Normal range of motion  He exhibits no deformity or signs of injury  Neurological: He is alert  He exhibits normal muscle tone  Coordination normal    Skin: Skin is warm  Capillary refill takes less than 2 seconds  No petechiae and no rash noted  He is not diaphoretic  No jaundice     Small puncture wound right wrist, no bleeding, no tenderness, no erythema       Vital Signs  ED Triage Vitals   Temperature Pulse Respirations Blood Pressure SpO2   08/22/18 2129 08/22/18 2127 08/22/18 2127 08/22/18 2127 08/22/18 2127   99 3 °F (37 4 °C) (!) 112 22 (!) 122/49 100 %      Temp src Heart Rate Source Patient Position - Orthostatic VS BP Location FiO2 (%)   08/22/18 2129 08/22/18 2127 08/22/18 2127 08/22/18 2127 --   Oral Monitor Sitting Left arm       Pain Score       --                  Vitals:    08/22/18 2127   BP: (!) 122/49   Pulse: (!) 112   Patient Position - Orthostatic VS: Sitting       Visual Acuity      ED Medications  Medications   amoxicillin-clavulanate (AUGMENTIN) 400-57 mg/5 mL oral suspension 568 mg (568 mg Oral Given 8/22/18 2221)       Diagnostic Studies  Results Reviewed     None                 No orders to display              Procedures  Procedures       Phone Contacts  ED Phone Contact    ED Course                               MDM  CritCare Time    Disposition  Final diagnoses:   Dog bite, initial encounter     Time reflects when diagnosis was documented in both MDM as applicable and the Disposition within this note     Time User Action Codes Description Comment    8/22/2018 10:15 PM Candace Marinelli [[id number]  0XXA] Dog bite, initial encounter       ED Disposition     ED Disposition Condition Comment    Discharge  [place] discharge to home/self care      Condition at discharge: Good        Follow-up Information     Follow up With Specialties Details Why Contact Info Additional Information    Charlene Hagan MD Pediatrics In 3 days re-evaluation [address]  [facility] [place] [address] [facility] [address] Emergency Department Emergency Medicine  As needed, If symptoms worsen [address] [facility]  AN ED, [address], [place], [address] [facility], [address]          Discharge Medication List as of 8/22/2018 10:17 PM      START taking these medications    Details   amoxicillin-clavulanate (AUGMENTIN) 400-57 mg/5 mL suspension Take 7 1 mL (568 mg total) by mouth 2 (two) times a day for 10 days, Starting Wed 8/22/2018, Until Sat 9/1/2018, Print         CONTINUE these medications which have NOT CHANGED    Details   albuterol (2 5 mg/3 mL) 0 083 % nebulizer solution Albuterol Sulfate (2 5 MG/3ML) 0 083% Inhalation Nebulization Solution  Refills: 0    Monty Dallas;  Started 3-Feb-2015 Active, Starting 2/3/2015, Until Oriented - self; Oriented - place; Oriented - time Discontinued, Historical Med      albuterol (PROVENTIL HFA,VENTOLIN HFA) 90 mcg/act inhaler Inhale 2 puffs as needed for wheezing , Until Discontinued, Historical Med      FLOVENT  MCG/ACT inhaler Inhale 2 puffs 2 (two) times a day, Starting Fri 7/13/2018, Historical Med      omeprazole (PriLOSEC) 20 mg delayed release capsule Take 1 capsule (20 mg total) by mouth daily, Starting Fri 8/10/2018, Normal           No discharge procedures on file      ED Provider  Electronically Signed by           Fabrice Byrnes MD  08/23/18 9201

## 2022-06-06 NOTE — ED ADULT NURSE NOTE - CHIEF COMPLAINT QUOTE
abd pain surgery  6/2  HBG 7 at [facility]   had blood transfusion Pt c/o feeling fatigue legs ting deysi ALEGRIA NEG Has seen surgeon today Dr Hiwot Pop Rt thigh swollen

## 2022-06-07 ENCOUNTER — TRANSCRIPTION ENCOUNTER (OUTPATIENT)
Age: 27
End: 2022-06-07

## 2022-06-07 ENCOUNTER — INPATIENT (INPATIENT)
Facility: HOSPITAL | Age: 27
LOS: 0 days | Discharge: HOME | DRG: 812 | End: 2022-06-08
Attending: INTERNAL MEDICINE | Admitting: INTERNAL MEDICINE
Payer: COMMERCIAL

## 2022-06-07 VITALS
DIASTOLIC BLOOD PRESSURE: 91 MMHG | WEIGHT: 158.07 LBS | OXYGEN SATURATION: 100 % | SYSTOLIC BLOOD PRESSURE: 125 MMHG | HEART RATE: 93 BPM | TEMPERATURE: 98 F | HEIGHT: 62 IN | RESPIRATION RATE: 20 BRPM

## 2022-06-07 VITALS
SYSTOLIC BLOOD PRESSURE: 116 MMHG | HEART RATE: 91 BPM | DIASTOLIC BLOOD PRESSURE: 84 MMHG | OXYGEN SATURATION: 100 % | RESPIRATION RATE: 19 BRPM | TEMPERATURE: 98 F

## 2022-06-07 DIAGNOSIS — F11.90 OPIOID USE, UNSPECIFIED, UNCOMPLICATED: ICD-10-CM

## 2022-06-07 DIAGNOSIS — D62 ACUTE POSTHEMORRHAGIC ANEMIA: ICD-10-CM

## 2022-06-07 DIAGNOSIS — K92.2 GASTROINTESTINAL HEMORRHAGE, UNSPECIFIED: ICD-10-CM

## 2022-06-07 DIAGNOSIS — Z98.890 OTHER SPECIFIED POSTPROCEDURAL STATES: Chronic | ICD-10-CM

## 2022-06-07 DIAGNOSIS — R10.9 UNSPECIFIED ABDOMINAL PAIN: ICD-10-CM

## 2022-06-07 DIAGNOSIS — Z98.89 OTHER SPECIFIED POSTPROCEDURAL STATES: Chronic | ICD-10-CM

## 2022-06-07 LAB
ALBUMIN SERPL ELPH-MCNC: 3.3 G/DL — SIGNIFICANT CHANGE UP (ref 3.3–5)
ALP SERPL-CCNC: 54 U/L — SIGNIFICANT CHANGE UP (ref 30–120)
ALT FLD-CCNC: 16 U/L DA — SIGNIFICANT CHANGE UP (ref 10–60)
AMPHET UR-MCNC: NEGATIVE — SIGNIFICANT CHANGE UP
ANION GAP SERPL CALC-SCNC: 5 MMOL/L — SIGNIFICANT CHANGE UP (ref 5–17)
APPEARANCE UR: CLEAR — SIGNIFICANT CHANGE UP
APTT BLD: 38.4 SEC — HIGH (ref 27.5–35.5)
AST SERPL-CCNC: 16 U/L — SIGNIFICANT CHANGE UP (ref 10–40)
BARBITURATES UR SCN-MCNC: NEGATIVE — SIGNIFICANT CHANGE UP
BASOPHILS # BLD AUTO: 0.02 K/UL — SIGNIFICANT CHANGE UP (ref 0–0.2)
BASOPHILS NFR BLD AUTO: 0.3 % — SIGNIFICANT CHANGE UP (ref 0–2)
BENZODIAZ UR-MCNC: POSITIVE
BILIRUB SERPL-MCNC: 0.8 MG/DL — SIGNIFICANT CHANGE UP (ref 0.2–1.2)
BILIRUB UR-MCNC: NEGATIVE — SIGNIFICANT CHANGE UP
BLD GP AB SCN SERPL QL: SIGNIFICANT CHANGE UP
BUN SERPL-MCNC: 7 MG/DL — SIGNIFICANT CHANGE UP (ref 7–23)
CALCIUM SERPL-MCNC: 9.3 MG/DL — SIGNIFICANT CHANGE UP (ref 8.4–10.5)
CHLORIDE SERPL-SCNC: 101 MMOL/L — SIGNIFICANT CHANGE UP (ref 96–108)
CO2 SERPL-SCNC: 30 MMOL/L — SIGNIFICANT CHANGE UP (ref 22–31)
COCAINE METAB.OTHER UR-MCNC: NEGATIVE — SIGNIFICANT CHANGE UP
COLOR SPEC: SIGNIFICANT CHANGE UP
CREAT SERPL-MCNC: 0.54 MG/DL — SIGNIFICANT CHANGE UP (ref 0.5–1.3)
DIFF PNL FLD: NEGATIVE — SIGNIFICANT CHANGE UP
EGFR: 131 ML/MIN/1.73M2 — SIGNIFICANT CHANGE UP
EOSINOPHIL # BLD AUTO: 0.16 K/UL — SIGNIFICANT CHANGE UP (ref 0–0.5)
EOSINOPHIL NFR BLD AUTO: 2.2 % — SIGNIFICANT CHANGE UP (ref 0–6)
GLUCOSE SERPL-MCNC: 99 MG/DL — SIGNIFICANT CHANGE UP (ref 70–99)
GLUCOSE UR QL: NEGATIVE — SIGNIFICANT CHANGE UP
HCG SERPL-ACNC: 1 MIU/ML — SIGNIFICANT CHANGE UP
HCT VFR BLD CALC: 26.3 % — LOW (ref 34.5–45)
HCT VFR BLD CALC: 29 % — LOW (ref 34.5–45)
HGB BLD-MCNC: 8.5 G/DL — LOW (ref 11.5–15.5)
HGB BLD-MCNC: 9.5 G/DL — LOW (ref 11.5–15.5)
IMM GRANULOCYTES NFR BLD AUTO: 1.2 % — SIGNIFICANT CHANGE UP (ref 0–1.5)
INR BLD: 1.02 RATIO — SIGNIFICANT CHANGE UP (ref 0.88–1.16)
KETONES UR-MCNC: NEGATIVE — SIGNIFICANT CHANGE UP
LEUKOCYTE ESTERASE UR-ACNC: NEGATIVE — SIGNIFICANT CHANGE UP
LYMPHOCYTES # BLD AUTO: 2.08 K/UL — SIGNIFICANT CHANGE UP (ref 1–3.3)
LYMPHOCYTES # BLD AUTO: 28.7 % — SIGNIFICANT CHANGE UP (ref 13–44)
MCHC RBC-ENTMCNC: 30.2 PG — SIGNIFICANT CHANGE UP (ref 27–34)
MCHC RBC-ENTMCNC: 30.4 PG — SIGNIFICANT CHANGE UP (ref 27–34)
MCHC RBC-ENTMCNC: 32.3 GM/DL — SIGNIFICANT CHANGE UP (ref 32–36)
MCHC RBC-ENTMCNC: 32.8 GM/DL — SIGNIFICANT CHANGE UP (ref 32–36)
MCV RBC AUTO: 92.9 FL — SIGNIFICANT CHANGE UP (ref 80–100)
MCV RBC AUTO: 93.6 FL — SIGNIFICANT CHANGE UP (ref 80–100)
METHADONE UR-MCNC: NEGATIVE — SIGNIFICANT CHANGE UP
MONOCYTES # BLD AUTO: 0.49 K/UL — SIGNIFICANT CHANGE UP (ref 0–0.9)
MONOCYTES NFR BLD AUTO: 6.8 % — SIGNIFICANT CHANGE UP (ref 2–14)
NEUTROPHILS # BLD AUTO: 4.41 K/UL — SIGNIFICANT CHANGE UP (ref 1.8–7.4)
NEUTROPHILS NFR BLD AUTO: 60.8 % — SIGNIFICANT CHANGE UP (ref 43–77)
NITRITE UR-MCNC: NEGATIVE — SIGNIFICANT CHANGE UP
NRBC # BLD: 0 /100 WBCS — SIGNIFICANT CHANGE UP (ref 0–0)
NRBC # BLD: 0 /100 WBCS — SIGNIFICANT CHANGE UP (ref 0–0)
OPIATES UR-MCNC: POSITIVE
OXYCODONE UR-MCNC: POSITIVE
PCP SPEC-MCNC: SIGNIFICANT CHANGE UP
PCP UR-MCNC: NEGATIVE — SIGNIFICANT CHANGE UP
PH UR: 7 — SIGNIFICANT CHANGE UP (ref 5–8)
PLATELET # BLD AUTO: 293 K/UL — SIGNIFICANT CHANGE UP (ref 150–400)
PLATELET # BLD AUTO: 334 K/UL — SIGNIFICANT CHANGE UP (ref 150–400)
POTASSIUM SERPL-MCNC: 4.5 MMOL/L — SIGNIFICANT CHANGE UP (ref 3.5–5.3)
POTASSIUM SERPL-SCNC: 4.5 MMOL/L — SIGNIFICANT CHANGE UP (ref 3.5–5.3)
PROT SERPL-MCNC: 6.9 G/DL — SIGNIFICANT CHANGE UP (ref 6–8.3)
PROT UR-MCNC: NEGATIVE — SIGNIFICANT CHANGE UP
PROTHROM AB SERPL-ACNC: 11.7 SEC — SIGNIFICANT CHANGE UP (ref 10.5–13.4)
RBC # BLD: 2.81 M/UL — LOW (ref 3.8–5.2)
RBC # BLD: 3.12 M/UL — LOW (ref 3.8–5.2)
RBC # FLD: 13.9 % — SIGNIFICANT CHANGE UP (ref 10.3–14.5)
RBC # FLD: 13.9 % — SIGNIFICANT CHANGE UP (ref 10.3–14.5)
SARS-COV-2 RNA SPEC QL NAA+PROBE: SIGNIFICANT CHANGE UP
SODIUM SERPL-SCNC: 136 MMOL/L — SIGNIFICANT CHANGE UP (ref 135–145)
SP GR SPEC: 1.02 — SIGNIFICANT CHANGE UP (ref 1.01–1.02)
THC UR QL: POSITIVE
UROBILINOGEN FLD QL: NEGATIVE — SIGNIFICANT CHANGE UP
WBC # BLD: 7.25 K/UL — SIGNIFICANT CHANGE UP (ref 3.8–10.5)
WBC # BLD: 9.77 K/UL — SIGNIFICANT CHANGE UP (ref 3.8–10.5)
WBC # FLD AUTO: 7.25 K/UL — SIGNIFICANT CHANGE UP (ref 3.8–10.5)
WBC # FLD AUTO: 9.77 K/UL — SIGNIFICANT CHANGE UP (ref 3.8–10.5)

## 2022-06-07 PROCEDURE — 74176 CT ABD & PELVIS W/O CONTRAST: CPT | Mod: 26,MA

## 2022-06-07 PROCEDURE — 86900 BLOOD TYPING SEROLOGIC ABO: CPT

## 2022-06-07 PROCEDURE — U0005: CPT

## 2022-06-07 PROCEDURE — 96374 THER/PROPH/DIAG INJ IV PUSH: CPT

## 2022-06-07 PROCEDURE — 86850 RBC ANTIBODY SCREEN: CPT

## 2022-06-07 PROCEDURE — P9016: CPT

## 2022-06-07 PROCEDURE — 80053 COMPREHEN METABOLIC PANEL: CPT

## 2022-06-07 PROCEDURE — 81003 URINALYSIS AUTO W/O SCOPE: CPT

## 2022-06-07 PROCEDURE — 99232 SBSQ HOSP IP/OBS MODERATE 35: CPT | Mod: GC

## 2022-06-07 PROCEDURE — 85027 COMPLETE CBC AUTOMATED: CPT

## 2022-06-07 PROCEDURE — 36415 COLL VENOUS BLD VENIPUNCTURE: CPT

## 2022-06-07 PROCEDURE — 85610 PROTHROMBIN TIME: CPT

## 2022-06-07 PROCEDURE — U0003: CPT

## 2022-06-07 PROCEDURE — 80307 DRUG TEST PRSMV CHEM ANLYZR: CPT

## 2022-06-07 PROCEDURE — 74177 CT ABD & PELVIS W/CONTRAST: CPT | Mod: MA

## 2022-06-07 PROCEDURE — 85025 COMPLETE CBC W/AUTO DIFF WBC: CPT

## 2022-06-07 PROCEDURE — 85730 THROMBOPLASTIN TIME PARTIAL: CPT

## 2022-06-07 PROCEDURE — 86901 BLOOD TYPING SEROLOGIC RH(D): CPT

## 2022-06-07 PROCEDURE — 84702 CHORIONIC GONADOTROPIN TEST: CPT

## 2022-06-07 PROCEDURE — 93970 EXTREMITY STUDY: CPT

## 2022-06-07 PROCEDURE — 82330 ASSAY OF CALCIUM: CPT

## 2022-06-07 PROCEDURE — 71045 X-RAY EXAM CHEST 1 VIEW: CPT

## 2022-06-07 PROCEDURE — 99285 EMERGENCY DEPT VISIT HI MDM: CPT

## 2022-06-07 PROCEDURE — 99223 1ST HOSP IP/OBS HIGH 75: CPT

## 2022-06-07 PROCEDURE — 86923 COMPATIBILITY TEST ELECTRIC: CPT

## 2022-06-07 PROCEDURE — 36430 TRANSFUSION BLD/BLD COMPNT: CPT

## 2022-06-07 PROCEDURE — 83690 ASSAY OF LIPASE: CPT

## 2022-06-07 PROCEDURE — 93005 ELECTROCARDIOGRAM TRACING: CPT

## 2022-06-07 PROCEDURE — 71275 CT ANGIOGRAPHY CHEST: CPT | Mod: MA

## 2022-06-07 PROCEDURE — 96375 TX/PRO/DX INJ NEW DRUG ADDON: CPT

## 2022-06-07 PROCEDURE — G0480: CPT

## 2022-06-07 RX ORDER — MORPHINE SULFATE 50 MG/1
4 CAPSULE, EXTENDED RELEASE ORAL ONCE
Refills: 0 | Status: DISCONTINUED | OUTPATIENT
Start: 2022-06-07 | End: 2022-06-07

## 2022-06-07 RX ORDER — CYCLOBENZAPRINE HYDROCHLORIDE 10 MG/1
10 TABLET, FILM COATED ORAL THREE TIMES A DAY
Refills: 0 | Status: DISCONTINUED | OUTPATIENT
Start: 2022-06-07 | End: 2022-06-07

## 2022-06-07 RX ORDER — SODIUM CHLORIDE 9 MG/ML
1000 INJECTION INTRAMUSCULAR; INTRAVENOUS; SUBCUTANEOUS ONCE
Refills: 0 | Status: COMPLETED | OUTPATIENT
Start: 2022-06-07 | End: 2022-06-07

## 2022-06-07 RX ORDER — DIAZEPAM 5 MG
10 TABLET ORAL ONCE
Refills: 0 | Status: DISCONTINUED | OUTPATIENT
Start: 2022-06-07 | End: 2022-06-07

## 2022-06-07 RX ORDER — DIAZEPAM 5 MG
5 TABLET ORAL ONCE
Refills: 0 | Status: DISCONTINUED | OUTPATIENT
Start: 2022-06-07 | End: 2022-06-07

## 2022-06-07 RX ORDER — HYDROMORPHONE HYDROCHLORIDE 2 MG/ML
1 INJECTION INTRAMUSCULAR; INTRAVENOUS; SUBCUTANEOUS ONCE
Refills: 0 | Status: DISCONTINUED | OUTPATIENT
Start: 2022-06-07 | End: 2022-06-07

## 2022-06-07 RX ORDER — HYDROMORPHONE HYDROCHLORIDE 2 MG/ML
1 INJECTION INTRAMUSCULAR; INTRAVENOUS; SUBCUTANEOUS EVERY 4 HOURS
Refills: 0 | Status: DISCONTINUED | OUTPATIENT
Start: 2022-06-07 | End: 2022-06-07

## 2022-06-07 RX ORDER — PANTOPRAZOLE SODIUM 20 MG/1
40 TABLET, DELAYED RELEASE ORAL ONCE
Refills: 0 | Status: COMPLETED | OUTPATIENT
Start: 2022-06-07 | End: 2022-06-07

## 2022-06-07 RX ORDER — PANTOPRAZOLE SODIUM 20 MG/1
40 TABLET, DELAYED RELEASE ORAL
Refills: 0 | Status: DISCONTINUED | OUTPATIENT
Start: 2022-06-07 | End: 2022-06-07

## 2022-06-07 RX ORDER — GABAPENTIN 400 MG/1
600 CAPSULE ORAL THREE TIMES A DAY
Refills: 0 | Status: DISCONTINUED | OUTPATIENT
Start: 2022-06-07 | End: 2022-06-07

## 2022-06-07 RX ORDER — HYDROMORPHONE HYDROCHLORIDE 2 MG/ML
0.5 INJECTION INTRAMUSCULAR; INTRAVENOUS; SUBCUTANEOUS EVERY 6 HOURS
Refills: 0 | Status: DISCONTINUED | OUTPATIENT
Start: 2022-06-07 | End: 2022-06-07

## 2022-06-07 RX ORDER — DIAZEPAM 5 MG
10 TABLET ORAL EVERY 8 HOURS
Refills: 0 | Status: DISCONTINUED | OUTPATIENT
Start: 2022-06-07 | End: 2022-06-07

## 2022-06-07 RX ORDER — METHOCARBAMOL 500 MG/1
750 TABLET, FILM COATED ORAL EVERY 6 HOURS
Refills: 0 | Status: DISCONTINUED | OUTPATIENT
Start: 2022-06-07 | End: 2022-06-07

## 2022-06-07 RX ORDER — OXYCODONE AND ACETAMINOPHEN 5; 325 MG/1; MG/1
2 TABLET ORAL EVERY 4 HOURS
Refills: 0 | Status: DISCONTINUED | OUTPATIENT
Start: 2022-06-07 | End: 2022-06-07

## 2022-06-07 RX ADMIN — HYDROMORPHONE HYDROCHLORIDE 1 MILLIGRAM(S): 2 INJECTION INTRAMUSCULAR; INTRAVENOUS; SUBCUTANEOUS at 07:34

## 2022-06-07 RX ADMIN — METHOCARBAMOL 750 MILLIGRAM(S): 500 TABLET, FILM COATED ORAL at 17:38

## 2022-06-07 RX ADMIN — PANTOPRAZOLE SODIUM 40 MILLIGRAM(S): 20 TABLET, DELAYED RELEASE ORAL at 21:04

## 2022-06-07 RX ADMIN — SODIUM CHLORIDE 1000 MILLILITER(S): 9 INJECTION INTRAMUSCULAR; INTRAVENOUS; SUBCUTANEOUS at 20:05

## 2022-06-07 RX ADMIN — HYDROMORPHONE HYDROCHLORIDE 1 MILLIGRAM(S): 2 INJECTION INTRAMUSCULAR; INTRAVENOUS; SUBCUTANEOUS at 06:22

## 2022-06-07 RX ADMIN — Medication 5 MILLIGRAM(S): at 02:38

## 2022-06-07 RX ADMIN — Medication 10 MILLIGRAM(S): at 11:58

## 2022-06-07 RX ADMIN — HYDROMORPHONE HYDROCHLORIDE 1 MILLIGRAM(S): 2 INJECTION INTRAMUSCULAR; INTRAVENOUS; SUBCUTANEOUS at 02:05

## 2022-06-07 RX ADMIN — HYDROMORPHONE HYDROCHLORIDE 0.5 MILLIGRAM(S): 2 INJECTION INTRAMUSCULAR; INTRAVENOUS; SUBCUTANEOUS at 17:37

## 2022-06-07 RX ADMIN — HYDROMORPHONE HYDROCHLORIDE 1 MILLIGRAM(S): 2 INJECTION INTRAMUSCULAR; INTRAVENOUS; SUBCUTANEOUS at 12:02

## 2022-06-07 RX ADMIN — Medication 5 MILLIGRAM(S): at 22:42

## 2022-06-07 RX ADMIN — SODIUM CHLORIDE 1000 MILLILITER(S): 9 INJECTION INTRAMUSCULAR; INTRAVENOUS; SUBCUTANEOUS at 22:31

## 2022-06-07 RX ADMIN — MORPHINE SULFATE 4 MILLIGRAM(S): 50 CAPSULE, EXTENDED RELEASE ORAL at 21:08

## 2022-06-07 RX ADMIN — HYDROMORPHONE HYDROCHLORIDE 1 MILLIGRAM(S): 2 INJECTION INTRAMUSCULAR; INTRAVENOUS; SUBCUTANEOUS at 02:30

## 2022-06-07 RX ADMIN — PANTOPRAZOLE SODIUM 40 MILLIGRAM(S): 20 TABLET, DELAYED RELEASE ORAL at 17:37

## 2022-06-07 RX ADMIN — PANTOPRAZOLE SODIUM 80 MILLIGRAM(S): 20 TABLET, DELAYED RELEASE ORAL at 01:27

## 2022-06-07 RX ADMIN — MORPHINE SULFATE 4 MILLIGRAM(S): 50 CAPSULE, EXTENDED RELEASE ORAL at 20:00

## 2022-06-07 RX ADMIN — HYDROMORPHONE HYDROCHLORIDE 1 MILLIGRAM(S): 2 INJECTION INTRAMUSCULAR; INTRAVENOUS; SUBCUTANEOUS at 09:26

## 2022-06-07 NOTE — H&P ADULT - PROBLEM SELECTOR PLAN 1
- etiology likely related to recent extended abdominoplasty on 6/2/22 (per plastic surgery, Dr. Hiwot Pop) but DDx also includes GI bleed given one episode of dark stools and anemia as below  - CT A/P w/ IV contrast on 6/5 as reported: No acute pathology. Postsurgical changes in the anterior abdominal wall without drainable collection.  - appreciate plastic surgery / Dr. Pop's evaluation and recommendations: "Although I did not see any abdominal exam abnormalities outside of the typical perioperative standpoint, I was unable to properly examine her due to pain complaints. LAURITA drains were always serosanguineous... repeat CT a/p unremarkable and unchanged from prior exam  - GI consult emailed on 6/7  - analgesia regimen as below  - serial abdominal examinations  - fall precautions

## 2022-06-07 NOTE — H&P ADULT - NSHPPHYSICALEXAM_GEN_ALL_CORE
Vital Signs Last 24 Hrs  T(F): 97.8 (07 Jun 2022 11:56), Max: 98 (06 Jun 2022 21:23)  HR: 91 (07 Jun 2022 11:56) (75 - 107)  BP: 136/98 (07 Jun 2022 11:56) (111/71 - 136/98)  RR: 20 (07 Jun 2022 11:56) (16 - 22)  SpO2: 100% (07 Jun 2022 11:56) (97% - 100%)    GEN: thin man, laying in stretcher in NAD  PSYCH: A&Ox3, mood and affect appear appropriate   SKIN: intact, no e/o rash  NEURO: no focal neurologic deficits appreciated; CN II-XII intact, sensation intact B/L, motor 5/5 in all mm groups  EYES: PERRL, anicteric, EOMI, no vertical/horizontal nystagmus  HEAD: NC, AT  NECK: supple  RESPI: no accessory muscle use, B/L air entry, CTAB   CARDIO: regular rate/rhythm, no LE edema B/L  ABD: soft, NT, ND, +BS  EXT: patient able to move all extremities spontaneously  VASC: peripheral pulses palpated Vital Signs Last 24 Hrs  T(F): 97.8 (07 Jun 2022 11:56), Max: 98 (06 Jun 2022 21:23)  HR: 91 (07 Jun 2022 11:56) (75 - 107)  BP: 136/98 (07 Jun 2022 11:56) (111/71 - 136/98)  RR: 20 (07 Jun 2022 11:56) (16 - 22)  SpO2: 100% (07 Jun 2022 11:56) (97% - 100%)    GEN: young woman, laying in stretcher in great distress d/t pain/anxiety  PSYCH: A&Ox3, mood and affect appear agitated/anxious/inconsolable  SKIN: B/L LE ecchymoses extending from abdomen/back, +abd drains  NEURO: no focal neurologic deficits appreciated grossly  HEAD: NC, AT  NECK: supple, no e/o elevated JVP  EXT: patient able to move all extremities spontaneously and ambulating in room  Patient refusing further examination.

## 2022-06-07 NOTE — ED ADULT NURSE REASSESSMENT NOTE - NS ED NURSE REASSESS COMMENT FT1
pt was encouraged to remain NPO after midnight. pt informed this writer "I'm eating". s/o at bedside presently

## 2022-06-07 NOTE — H&P ADULT - PROBLEM SELECTOR PLAN 3
- ISTOP reference in chart  - f/u [facility]  - as O/P it appears patient has filled recent Rx (per Dr. Azra Caldera) for chronic use of Oxycodone-Acetaminophen  Q6H PRN and ?breakthrough Rx w/ Hydromorphone 2mg PO TID PRN  - for now, pending repeat H&H, will convert to IV regimen w/ IV Dilaudid 1mg Q4H PRN for severe pain, continuing home Gabapentin TID and Flexeril  - if patient remains stable and can tolerate PO, will convert to PO regimen ASAP  - chronic pain management consulted, will appreciate their recommendations

## 2022-06-07 NOTE — H&P ADULT - HISTORY OF PRESENT ILLNESS
ED Presenting Vitals: 97.8F, 107bpm, 124/79, 18br/m, 100% on RA  ED Course: s/p NS-500cc bolus x1, 1 unit PRBC, Protonix 80mg IVP x1, Dilaudid 1mg IVP x5, Dilaudid 0.5mg IVP x1, Diazepam 5mg PO x1, Diazepam 10mg PO x1 26yoF w/ PMHx of Marysol-Danlos, stage 4 endometriosis, fibromyalgia, chronic back pain / chronic opioid use (follows w/ Dr. Azra Caldera as O/P per ISTOP Reference in chart), who presents to ED s/p extended abdominoplasty on 6/2/22 (per plastic surgery, Dr. Hiwot Pop) d/t chronic panniculitis and loose hanging skin causing lower back pain, w/ severe abdominal pain and 1 episode of dark stool, found to be anemic (she was seen at [facility]'s ED on 6/5, D/[facility] after PRBC transfusion and imaging revealing non-drainable abdominal wall hematoma, w/ instructions to stop Lovenox for post-op PPx, which she self-administered (location: thighs) for 3 days, and re-presents from Dr. Pop's office to [facility]'s ED on 6/7). Patient is currently inconsolable, complaining of anxiety and feeling trapped "in this FDC," requesting, despite explanations otherwise, to be able to go in the sun to her car and come back, and threatening to leave the hospital against medical advice if she is unable to do this. She also states she is ("and always will be") in 10/10 pain, on prompting described as abdominal and perhaps epigastric though "not like heart-burn;" she shows pictures on her phone of the progression of her extensive ecchymoses over B/L thighs/abdomen/back as well as e/o dark stools/melena (of note, patient states she was prescribed iron supplementation tonya-operatively, but that she has never had anemia before, except post-operatively in 2020). Patient noted to have 1 isolated episode of fever to Tmax = 100.6F 2 nights ago, and early this morning at [facility] was noted to have had an unwitnessed fall as she was attempting to get out of her stretcher, falling on her right hip and hitting the commode but without hitting her head or losing consciousness she refused examination or imaging thereafter.     ED Presenting Vitals: 97.8F, 107bpm, 124/79, 18br/m, 100% on RA  ED Course: s/p NS-500cc bolus x1, 1 unit PRBC, Protonix 80mg IVP x1, Dilaudid 1mg IVP x5, Dilaudid 0.5mg IVP x1, Diazepam 5mg PO x1, Diazepam 10mg PO x1

## 2022-06-07 NOTE — DISCHARGE NOTE PROVIDER - REASON FOR ADMISSION
dark stools/melena, acute abdominal pain, anemia >>PATIENT LEFT AMA dark stools/melena, acute abdominal pain, anemia

## 2022-06-07 NOTE — CONSULT NOTE ADULT - CONSULT REQUESTED BY NAME
"Subjective:       Patient ID: Beka Bee is a 61 y.o. male.    Chief Complaint: suboxone    HPI CHIEF COMPLAINT    presents in office today seeking suboxone treatment for opiate addiction    HPI: 4 mg suboxone 3 d ago, quit oxycodone 4 d ago.     ONSET/TIMIN y  started with: .   prescribed drugs,.  reason neck and back     DURATION:     QUALITY/COURSE:  daily drugs dose:    60 mg oxycodone  .  Injection use: no   Huffing no       INTENSITY/SEVERITY:  severity 7 (on a 1-10 scale).(+).    CONTEXT/WHEN:     MODIFIERS/TREATMENTS:   PRIOR  use of suboxone: illicit from friend. Detox in past : No       REVIEW OF SYMPTOMS: HIV: no.  Hepatitis: no .     The following symptoms are positive if BOLD, negative otherwise.       Drugs of abuse:      (opoids, cannabus, alcohol, cocaine, meth, exstasy, pcp, inhalants).      ROS:   history of frequent trauma or accidental injuries  Anxiety  labile blood pressure up most of the time  sexual dysfuntion   depression   legal problems  gastrointestinal symptoms  sleep disorder   behavior disorder  family has issuses  work issues  Disability.  financial problems.     Loss of frendship   Cravings  constipation    Review of Systems      Objective:      Vitals:    19 1158   BP: 138/80   Pulse: 81   Temp: 98.1 °F (36.7 °C)   TempSrc: Oral   SpO2: 97%   Weight: 67.7 kg (149 lb 4 oz)   Height: 6' 2" (1.88 m)   PainSc:   8     Physical Exam   Constitutional: He appears well-developed and well-nourished.   Cardiovascular: Normal rate, regular rhythm and normal heart sounds.   Pulmonary/Chest: Effort normal and breath sounds normal.   Abdominal: Soft. There is no tenderness.   Neurological: He is alert.   Skin:   No needle marks.    Psychiatric: He has a normal mood and affect. His behavior is normal. Thought content normal.   Nursing note and vitals reviewed.      drug screen positive for oxycodone and buprenorphine  Assessment:       1. Narcotic dependence          Plan: "
      Narcotic dependence  -     buprenorphine-naloxone (SUBOXONE) 8-2 mg Film; Place 1 packet (1 each total) under the tongue 2 (two) times daily. for 1 day  Dispense: 2 packet; Refill: 0      Follow up in about 1 day (around 4/26/2019).      
Medicine
Medicine

## 2022-06-07 NOTE — ED ADULT NURSE NOTE - OBJECTIVE STATEMENT
pt reports she is "bleeding internally"; s/p abdominoplasty on 6/2/22; has 2 LAURITA drains; received PRBC's on 6/6/22. c/o abd pain

## 2022-06-07 NOTE — ED ADULT NURSE REASSESSMENT NOTE - NS ED NURSE REASSESS COMMENT FT1
Spoke with MD Cervantes regarding PRN pain medication order as PT is still rating her pain 10/10 despite Dilaudid administration. MD Cervantes states she will review the chart.

## 2022-06-07 NOTE — ED ADULT NURSE REASSESSMENT NOTE - NS ED NURSE REASSESS COMMENT FT1
Pt requesting Valium. Pt states "I have been waiting for Valium for 3 hours and I am ready to go." States that she would like to go outside for 5 minutes for fresh air. RN made pt aware that it is against hospital policy and a liability issue to have her leave the hospital as a patient. RN contacted admitting team [id number], states that attending is on the way down to see her right now. Reports that Pt requesting Valium. Pt states "I have been waiting for Valium for 3 hours and I am ready to go." States that she would like to go outside for 5 minutes for fresh air, noted to be tearful. RN thoroughly educated patient of risks of leaving, explained current plan of care to patient. RN contacted admitting team [id number], who states that attending is on the way down to see her right now. Pt made aware. Pt offered gabapentin that was ordered. Pt refused. Pt safety maintained. Awaiting admitting team eval & bed at this time.

## 2022-06-07 NOTE — CHART NOTE - NSCHARTNOTEFT_GEN_A_CORE
Confidential Drug Utilization Report  Search Terms: Cynthia Ellison, 1995Search Date: 06/07/2022 10:27:34 AM  The Drug Utilization Report below displays all of the controlled substance prescriptions, if any, that your patient has filled in the last twelve months. The information displayed on this report is compiled from pharmacy submissions to the Department, and accurately reflects the information as submitted by the pharmacies.    This report was requested by: Gonzales Loyd | Reference #: [id number]    Others' Prescriptions  Patient Name: Cynthia Ellison  YOB: 1995  Address: [address] [place] [address]  Sex: Female  Rx Written	Rx Dispensed	Drug	                            Quantity	Days Supply	Prescriber Name	Prescriber Jeanine #	Payment Method	Dispenser  06/06/2022	06/06/2022	hydromorphone 2 mg tablet	15	5	Azra Caldera	[id number]	Insurance	[facility] [id number]  05/24/2022	06/01/2022	hydromorphone 2 mg tablet	15	5	Azra Caldera	[id number]	Insurance	[facility] [id number]  05/09/2022	05/14/2022	oxycodone-acetaminophen  mg tab	120	30	Azra Caldera	[id number]	Insurance	[facility] [id number]  04/04/2022	04/12/2022	oxycodone-acetaminophen  mg tab	120	30	Azra Caldera	[id number]	Insurance	[facility] [id number]  03/01/2022	03/11/2022	dextroamp-amphetamine 5 mg tab	60	30	Sandee Bhagat S, PA	[id number]	Insurance	[facility] [id number]  03/07/2022	03/11/2022	oxycodone-acetaminophen  mg tab	120	30	Azra Caldera	[id number]	Insurance	[facility] [id number]  02/01/2022	02/10/2022	dextroamp-amphetamine 5 mg tab	60	30	Sandee Bhagat S, PA	[id number]	Insurance	[facility] [id number]    Patient Name: Cynthia Ellison  YOB: 1995  Address: [address] [place] [address]  Sex: Female  Rx Written	Rx Dispensed	Drug	Quantity	Days Supply	Prescriber Name	Prescriber Jeanine #	Payment Method	Dispenser  02/03/2022	02/08/2022	oxycodone-acetaminophen  mg tab	120	30	Azra Caldera	[id number]	Cash	[facility] #[id number]  01/28/2022	01/30/2022	oxycodone-acetaminophen  mg tab	28	7	Azra Caldera	[id number]	Insurance	[facility] #[id number]  01/21/2022	01/21/2022	oxycodone-acetaminophen  mg tab	28	7	Azra Caldera	[id number]	Insurance	[facility] #[id number]  01/04/2022	01/06/2022	dextroamp-amphetamine 5 mg tab	30	15	Sandee Bhagat S, PA	[id number]	Insurance	[facility] #[id number]  12/13/2021	12/20/2021	oxycodone-acetaminophen  mg tab	120	30	Azra Caldera	[id number]	Insurance	[facility] #[id number]  12/07/2021	12/12/2021	dextroamp-amphetamine 5 mg tab	40	20	Sandee Bhagat S, PA	[id number]	Insurance	[facility] #[id number]  11/15/2021	11/22/2021	oxycodone-acetaminophen  mg tab	90	30	Azra Caldera	[id number]	Insurance	[facility] #[id number]  11/09/2021	11/14/2021	dextroamp-amphetamine 5 mg tab	30	30	Sandee Bhagat S, PA	[id number]	Insurance	[facility] #[id number]  10/25/2021	10/27/2021	dextroamp-amphetamine 5 mg tab	14	14	Sandee Bhagat S, PA	[id number]	Insurance	[facility] #[id number]  10/19/2021	10/21/2021	oxycodone-acetaminophen  mg tab	90	30	Kavin Goldberg MD	[id number]	Insurance	[facility] #[id number]  09/23/2021	09/24/2021	oxycodone-acetaminophen  mg tab	90	30	Azra Caldera	[id number]	Insurance	[facility] #[id number]  08/25/2021	08/26/2021	oxycodone-acetaminophen  mg tab	90	30	Kavin Goldberg MD	[id number]	Insurance	[facility] #[id number]  07/19/2021	07/30/2021	oxycodone-acetaminophen  mg tab	90	30	Azra Caldera	[id number]	Insurance	[facility] #[id number]  06/28/2021	07/01/2021	oxycodone-acetaminophen  mg tab	90	30	Azra Caldera	[id number]	Insurance	[facility] #[id number]

## 2022-06-07 NOTE — CONSULT NOTE ADULT - ASSESSMENT
26yoF w/ PMHx of Marysol-Danlos, stage 4 endometriosis, fibromyalgia, chronic back pain / chronic opioid use (follows w/ Dr. Azra Caldera as O/P,  who presents to ED s/p extended abdominoplasty on 6/2/22 (per plastic surgery, Dr. Hiwot Pop) d/t chronic panniculitis and loose hanging skin causing lower back pain, w/ severe abdominal pain and 1 episode of dark stool, found to be anemic (she was seen at [facility]'s ED on 6/5, D/Ephraim after PRBC transfusion and imaging revealing non-drainable abdominal wall hematoma, w/ instructions to stop Lovenox for post-op PPx, which she self-administered (location: thighs) for 3 days, and re-presents from Dr. Pop's office to [facility]'s ED on 6/7). Patient is currently inconsolable, complaining of anxiety and feeling trapped "in this snf," requesting, despite explanations otherwise, to be able to go in the sun to her car and come back, and threatening to leave the hospital against medical advice if she is unable to do this. She also states she is ("and always will be") in 10/10 pain, on prompting described as abdominal and perhaps epigastric though "not like heart-burn;" she shows pictures on her phone of the progression of her extensive ecchymoses over B/L thighs/abdomen/back as well as e/o dark stools/melena (of note, patient states she was prescribed iron supplementation tonya-operatively, but that she has never had anemia before, except post-operatively in 2020). Patient noted to have 1 isolated episode of fever to Tmax = 100.6F 2 nights ago, and early this morning at [facility] was noted to have had an unwitnessed fall as she was attempting to get out of her stretcher, falling on her right hip and hitting the commode but without hitting her head or losing consciousness she refused examination or imaging thereafter.      Pt with chronic pain of post laminectomy syndrome and fibromyalgia with acute postop abdominoplasty pain.    Spoke with outpatient pain provider Dr Azra Caldera.  Pt has been on percocet 10/325, usually 2-4 tabs a day for pain.  Was given dilaudid 2 mg TID PRN to use for post abdominoplasty pain.  Pt also takes neurontin 600 mg TID at home.    Is a daily recreational marijuana smoker, states it does not help her pain.  Asked her boyfriend to tell her mother to bring her in valium and xanax for anxiety.  No record of any benzodiazepine prescriptions on her [facility]  for the past year.  Urine drug toxicology immunoassay done today + for benzodiazepines; urine sent out to [facility] lab for parent drug and metabolite confirmation.  Pt is exhibiting symptoms of benzo withdrawal: insomnia, irritable, anxious, tremors.  States "I want to be sedated or I may as well go home and die".  Apprised pt that she will be seen by Behavioral Health and pt did agree to this.  Behavioral Health suggested valium 10 mg PO Q 8 hours x 3 doses.    Percocet 5/325 2 tabs PO Q 4 hours PRN moderate pain ordered.  Discontinue IV dilaudid 1 mg and start 0.5 mg Q 6 hours PRN severe pain- equivalent to the 2 mg PO dose the pt was taking at home.  Continue neurontin 600 mg TID (home dose)- current CrCl is 178.7.  Flexeril discontinued- pt does not want.  Start robaxin 750 mg Q 6 hours ATC.    Monitor for sedation, respiratory depression, and benzodiazepine withdrawal.    Spoke with [facility], Medicine team, Nursing staff, outpatient pain management, and Behavioral Health.  Behavioral Health consult to be done tomorrow.      Minutes spent on total encounter:  90 minutes      [facility]  [phone]

## 2022-06-07 NOTE — DISCHARGE NOTE PROVIDER - CARE PROVIDER_API CALL
Radha Michelle (DO)  Internal Medicine  [address] [place] [address]  Phone: [phone]  Fax: [phone]  Established Patient  Follow Up Time:     TORIE MEJIA  Gastroenterology  Phone: ()-  Fax: ()-  Established Patient  Follow Up Time:     Dr Caldera, pain management, [facility]  Phone: (   )    -  Fax: (   )    -  Established Patient  Follow Up Time:     plastic surgeon,   Phone: (   )    -  Fax: (   )    -  Established Patient  Follow Up Time:

## 2022-06-07 NOTE — ED ADULT NURSE REASSESSMENT NOTE - NS ED NURSE REASSESS COMMENT FT1
PT requesting to eat at this time. Spoke with LINNETTE Gonsales, states it is okay for PT to eat at this time.

## 2022-06-07 NOTE — ED PROVIDER NOTE - CLINICAL SUMMARY MEDICAL DECISION MAKING FREE TEXT BOX
anemia due to acute blood loss and abdominal pain S/P abdominoplasty ...  labs, CT scan, analgesia, anxiolytic, protoxin,  admit to medicine GI consult, plastic surgical follow up

## 2022-06-07 NOTE — ED ADULT NURSE REASSESSMENT NOTE - NS ED NURSE REASSESS COMMENT FT1
pt stating she fell rolling over in the stretcher, hit her right hip on the commode before hitting her left foot on the ground, pt denies any head trauma, denies LOC, pt denies any pain/injury from fall, pt states she was on the ground for 20 minutes "screaming for help as loud as she could" before being able to get up on her own, boyfriend at bedside states he did not hear her screaming for help, pt walked to door to alert staff she had fallen, LAURITA drains from prior abdominal surgery in place with no dislodgment, NP Billy notified regarding the fall, LINNETTE Arechiga at bedside for patient assessment, pt refusing imaging of right hip in relation to the fall, pt has no risk for fall prior to incident, pt frequently assessed prior to fall, pt refusing side rails up so she can use the commode, nonskid socks placed on patient, engineering contacted multiple times prior to fall regarding need for call bell to be replaced in patient room, pt stating she fell rolling over in the stretcher, hit her right hip on the commode before hitting her left foot on the ground, pt denies any head trauma, denies LOC, pt denies any pain/injury from fall, pt states she was on the ground for 20 minutes "screaming for help as loud as she could" before being able to get up on her own, boyfriend at bedside states he did not hear her screaming for help, pt walked to door to alert staff she had fallen, LAURITA drains from prior abdominal surgery in place with no dislodgment, NP Billy notified regarding the fall, LINNETTE Arechiga at bedside for patient assessment, pt refusing imaging of right hip in relation to the fall, pt has no risk for fall prior to incident, pt frequently assessed prior to fall, pt refusing side rails up so she can use the commode, nonskid socks placed on patient, engineering contacted multiple times prior to fall regarding need for call bell to be replaced in patient room with no indication on timing of arrival for call bell

## 2022-06-07 NOTE — ED ADULT TRIAGE NOTE - CHIEF COMPLAINT QUOTE
I am internally bleeding out of my GI track. I was here yesterday; I had an abdominoplasty on 06/02/2022; pt has 2 drains

## 2022-06-07 NOTE — H&P ADULT - NEGATIVE NEUROLOGICAL SYMPTOMS
no weakness/no syncope/no difficulty walking/no headache/no loss of consciousness/no hemiparesis/no confusion

## 2022-06-07 NOTE — ED PROVIDER NOTE - OBJECTIVE STATEMENT
I am internally bleeding out of my GI track. I was here yesterday; I had an abdominoplasty on 06/02/2022; pt has 2 drains  see chief complaint quote 25 y/o female C/C I am internally bleeding out of my GI track. I was here yesterday, abdominal pain,  I had an abdominoplasty on 06/02/2022. She was admitted to [facility] for GI work up but left the hospital AMA and came directly to [facility].  PMHx of Marysol-Danlos, stage 4 endometriosis, fibromyalgia, chronic back pain / chronic opioid use (follows w/ Dr. Azra Caldera as O/P per ISTOP Reference in chart), who presents to ED s/p extended abdominoplasty on 6/2/22 (per plastic surgery, Dr. Hiwot Pop) d/t chronic panniculitis and loose hanging skin causing lower back pain, w/ severe abdominal pain and 1 episode of dark stool, found to be anemic (she was seen at [facility]'s ED on 6/5, D/Ephraim after PRBC transfusion and imaging revealing non-drainable abdominal wall hematoma, w/ instructions to stop Lovenox for post-op PPx, which she self-administered (location: thighs) for 3 days, and re-presents from Dr. Pop's office to [facility]'s ED on 6/7).  Multiple CT scans were preformed,  reporting no bleed.  Wound drainage serous sangenous drainage, no fever, no chills. 27 y/o female C/C I am internally bleeding out of my GI track. I was here yesterday, abdominal pain,  I had an abdominoplasty on 06/02/2022. She was admitted to [facility] for GI work up but left the hospital AMA and came directly to [facility].  PMHx of Marysol-Danlos, stage 4 endometriosis, fibromyalgia, chronic back pain / chronic opioid use (follows w/ Dr. Azra Caldera as O/P per ISTOP Reference in chart), who presents to ED s/p extended abdominoplasty on 6/2/22 (per plastic surgery, Dr. Hiwot Pop) d/t chronic panniculitis and loose hanging skin causing lower back pain, w/ severe abdominal pain and 1 episode of dark stool, found to be anemic (she was seen at [facility] [place]'s ED on 6/5, D/Ephraim after PRBC transfusion and imaging revealing non-drainable abdominal wall hematoma, w/ instructions to stop Lovenox for post-op PPx, which she self-administered (location: thighs) for 3 days, and re-presents from Dr. Pop's office to [facility]'s ED on 6/7).  Multiple CT scans were preformed at  and [facility], all scans  reporting no bleeding .  Wound drainage serous sangenous drainage, no fever, no chills. Discussed case with Dr Pop who recommended admitting the patient at  for the GI work up. 27 y/o female C/C I am internally bleeding out of my GI track. I was here yesterday, abdominal pain,  I had an abdominoplasty on 06/02/2022. She was admitted to [facility] for GI work up but left the hospital AMA and came directly to [facility].  PMHx of Marysol-Danlos, stage 4 endometriosis, fibromyalgia, chronic back pain / chronic opioid use (follows w/ Dr. Azra Caldera as O/P, who presents to ED s/p extended abdominoplasty on 6/2/22 (per plastic surgery, Dr. Hiwot Pop) d/t chronic panniculitis and loose hanging skin causing lower back pain, w/ severe abdominal pain and 1 episode of dark stool, found to be anemic (she was seen at [facility]'s ED on 6/5, D/Ephraim after PRBC transfusion and imaging revealing non-drainable abdominal wall hematoma, w/ instructions to stop Lovenox for post-op PPx, which she self-administered (location: thighs) for 3 days, and re-presents from Dr. Pop's office to [facility]'s ED on 6/7). She had   multiple CT scans that were preformed during   and [facility] emergency room visits to rule out bleeding , all CT  scans were  reported normal  .  Surgical wound drainage is  sero-sanguineous , no fever, no chills. Discussed case with Dr Pop who recommended admitting the patient at  for the GI work up.

## 2022-06-07 NOTE — CONSULT NOTE ADULT - SUBJECTIVE AND OBJECTIVE BOX
HPI:  26yoF w/ PMHx of Marysol-Danlos, stage 4 endometriosis, fibromyalgia, chronic back pain / chronic opioid use (follows w/ Dr. Azra Caldera as O/P per ISTOP Reference in chart), who presents to ED s/p extended abdominoplasty on 22 (per plastic surgery, Dr. Hiwot Pop) d/t chronic panniculitis and loose hanging skin causing lower back pain, w/ severe abdominal pain and 1 episode of dark stool, found to be anemic (she was seen at [facility]'s ED on , D/Ephraim after PRBC transfusion and imaging revealing non-drainable abdominal wall hematoma, w/ instructions to stop Lovenox for post-op PPx, which she self-administered (location: thighs) for 3 days, and re-presents from Dr. Pop's office to [facility]'s ED on ). After the surgery, patient reported having urethra bleeding for 3 days and waking up in a puddle of blood. Did have 1 episode of melena yesterday (described as bloody/black/runny/funny smell) and also reports noticing bruise along her thigh *Rt) and buttock region. Associated sxs includes epigastric pain (new since the surgery) but denies any worsening of it after PO intake.  Hb in the ED 7.9, normal BUN. Repeat CT with no sign of retroperitoneal bleeding or drainable hematoma. GI called for further eval.     Allergies:  penicillins (Rash)  sulfa drugs (Rash)        Hospital Medications:  cyclobenzaprine 10 milliGRAM(s) Oral three times a day PRN  diazepam    Tablet 10 milliGRAM(s) Oral every 8 hours  gabapentin 600 milliGRAM(s) Oral three times a day  HYDROmorphone  Injectable 1 milliGRAM(s) IV Push every 4 hours PRN  pantoprazole  Injectable 40 milliGRAM(s) IV Push two times a day      PMHX/PSHX:  GERD (gastroesophageal reflux disease)    Marysol-Danlos disease    Endometriosis    Fibromyalgia    History of tonsillectomy    History of abdominoplasty        Family history:      Social History: no smoking    ROS:   General:  No fevers, chills or night sweats  ENT:  No sore throat or dysphagia  CV:  No pain or palpitations  Resp:  No dyspnea, cough or  wheezing  GI:  as above  Skin:  No rash or edema  Neuro: no weakness   Hematologic: no bleeding  Musculoskeletal: no muscle pain or join pain  Psych: no agitation     : no dysuria      PHYSICAL EXAM:   GENERAL:  NAD, Appears stated age  HEENT:  NC/AT,  conjunctivae clear and pink, sclera -anicteric  CHEST:  CTA B/L, Normal effort  HEART:  RRR S1/S2,  ABDOMEN:  Soft but tender to palpation, to LAURITA drains in suprapubic region, bloody output. Refused MARIANO  EXTREMITIES:  No cyanosis or Edema  SKIN:  Warm & Dry. brusie in Rt thigh and BL buttocks region  NEURO:  Alert, oriented, anxious     Vital Signs:  Vital Signs Last 24 Hrs  T(C): 36.6 (2022 15:31), Max: 36.7 (2022 21:23)  T(F): 97.9 (2022 15:31), Max: 98 (2022 21:23)  HR: 91 (2022 15:31) (75 - 107)  BP: 116/84 (2022 15:31) (111/71 - 136/98)  BP(mean): 108 (2022 11:56) (93 - 108)  RR: 19 (2022 15:31) (16 - 22)  SpO2: 100% (2022 15:31) (97% - 100%)  Daily Height in cm: 157.48 (2022 17:06)    Daily     LABS:                        8.5    7.25  )-----------( 293      ( 2022 07:11 )             26.3     Mean Cell Volume: 93.6 fl (22 @ 07:11)        139  |  99  |  6<L>  ----------------------------<  106<H>  4.0   |  29  |  0.54    Ca    9.4      2022 17:53    TPro  6.4  /  Alb  3.7  /  TBili  0.4  /  DBili  x   /  AST  15  /  ALT  6<L>  /  AlkPhos  49      LIVER FUNCTIONS - ( 2022 17:53 )  Alb: 3.7 g/dL / Pro: 6.4 g/dL / ALK PHOS: 49 U/L / ALT: 6 U/L / AST: 15 U/L / GGT: x           PT/INR - ( 2022 17:53 )   PT: 11.4 sec;   INR: 0.99 ratio         PTT - ( 2022 17:53 )  PTT:37.5 sec  Urinalysis Basic - ( 2022 13:10 )    Color: Light Yellow / Appearance: Clear / S.021 / pH: x  Gluc: x / Ketone: Negative  / Bili: Negative / Urobili: Negative   Blood: x / Protein: Negative / Nitrite: Negative   Leuk Esterase: Negative / RBC: x / WBC x   Sq Epi: x / Non Sq Epi: x / Bacteria: x      Amylase Serum--      Lipase serum29       Ammonia--                          8.5    7.25  )-----------( 293      ( 2022 07:11 )             26.3                         7.9    7.66  )-----------( 300      ( 2022 21:45 )             23.8                         8.6    9.03  )-----------( 313      ( 2022 17:53 )             26.9                         7.4    8.08  )-----------( 279      ( 2022 14:42 )             23.1     Imaging:  < from: CT Abdomen and Pelvis w/ IV Cont (22 @ 22:37) >  FINDINGS:  LOWER CHEST: Mild bibasilar linear atelectasis.    LIVER: Within normal limits.  BILE DUCTS: Normal caliber.  GALLBLADDER: New layering high density within the gallbladder likely due   to vicarious excretion of IV contrast from prior exam.  SPLEEN: Within normal limits.  PANCREAS: Within normal limits.  ADRENALS: Within normal limits.  KIDNEYS/URETERS: The kidneys enhance symmetrically. No hydronephrosis.    BLADDER: Partially obscured by streak artifact.  REPRODUCTIVE ORGANS: Partially obscured by streak artifact.    BOWEL: No bowel obstruction. No bowel wall thickening or inflammatory   changes. Appendix is normal. Limited assessment for active   gastrointestinal bleeding on this single portal venous phase study.  PERITONEUM: No ascites.  VESSELS: Within normal limits.  RETROPERITONEUM/LYMPH NODES: No lymphadenopathy. No evidence of   retroperitoneal hemorrhage.  ABDOMINAL WALL: Redemonstrated 2 surgical drainsin the anterior   abdominal wall with associated abdominal wall postsurgical changes   including diffuse subcutaneous fat stranding, scattered foci of   subcutaneous air and layering high density fluid in the right greater   than left lower abdominalwall, grossly unchanged from prior study. No   drainable collection.  BONES: Redemonstrated L5-S1 posterior lumbar fusion and disc spacer. No   acute osseous abnormality.    IMPRESSION:  No evidence of retroperitoneal hemorrhage. Limited assessment for   gastrointestinal bleeding.  Grossly unchanged anterior abdominal wall postsurgical changes with   subcutaneous edema, air and small layering hematomas, right greater than   left. No drainable collection.    < end of copied text >  
Chief Complaint:  Patient is a 26y old  Female who presents with a chief complaint of dark stools/melena, acute abdominal pain, anemia     HPI:  26yoF w/ PMHx of Marysol-Danlos, stage 4 endometriosis, fibromyalgia, chronic back pain / chronic opioid use (follows w/ Dr. Azra Caldera as O/P,  who presents to ED s/p extended abdominoplasty on 6/2/22 (per plastic surgery, Dr. Hiwot Pop) d/t chronic panniculitis and loose hanging skin causing lower back pain, w/ severe abdominal pain and 1 episode of dark stool, found to be anemic (she was seen at [place]'s ED on 6/5, D/Ephraim after PRBC transfusion and imaging revealing non-drainable abdominal wall hematoma, w/ instructions to stop Lovenox for post-op PPx, which she self-administered (location: thighs) for 3 days, and re-presents from Dr. Pop's office to [facility]'s ED on 6/7). Patient is currently inconsolable, complaining of anxiety and feeling trapped "in this retirement," requesting, despite explanations otherwise, to be able to go in the sun to her car and come back, and threatening to leave the hospital against medical advice if she is unable to do this. She also states she is ("and always will be") in 10/10 pain, on prompting described as abdominal and perhaps epigastric though "not like heart-burn;" she shows pictures on her phone of the progression of her extensive ecchymoses over B/L thighs/abdomen/back as well as e/o dark stools/melena (of note, patient states she was prescribed iron supplementation tonya-operatively, but that she has never had anemia before, except post-operatively in 2020). Patient noted to have 1 isolated episode of fever to Tmax = 100.6F 2 nights ago, and early this morning at [facility] was noted to have had an unwitnessed fall as she was attempting to get out of her stretcher, falling on her right hip and hitting the commode but without hitting her head or losing consciousness she refused examination or imaging thereafter.      Current Pain Score: 10/10    Current out- patient pain regimen: percocet 10/325 QID PRN, dilaudid 2 mg PO TID PRN breakthrough pain after abdominoplasty only    Out Patient Pain Management provider: Azra Caldera MD    [facility] Prescription Monitoring Program: Reference #[id number]    PAST MEDICAL & SURGICAL HISTORY:  GERD (gastroesophageal reflux disease)    Marysol-Danlos disease    Endometriosis    Fibromyalgia    History of tonsillectomy    History of abdominoplasty    SOCIAL HISTORY:  Tobacco Use: denies  Alcohol Use: denies  Recreational Marijuana: daily smoker  Illicit Drug Use: denies    Opioid Risk Tool (ORT-OUD) Score: high    Allergies    penicillins (Rash)  sulfa drugs (Rash)    Intolerances    None known    REVIEW OF SYSTEMS:  CONSTITUTIONAL: No fever, weight loss, fatigue, falls  NEURO: No headaches, memory loss, loss of strength, tremors, dizziness or blurred vision  RESP: No shortness of breath, cough  CV: No chest pain, palpitations  GI: + postop abdominal pain; no nausea, vomiting, diarrhea, constipation   : No urinary incontinence/retention, dysuria  MSK: No joint pain; + low back pain; no upper or lower motor strength weakness; no saddle anesthesia   SKIN: No itching, burning, rashes; + ecchymosis   PSYCHIATRIC: + anxiety; + mood swings; + difficulty sleeping      MEDICATIONS  (STANDING):  diazepam    Tablet 10 milliGRAM(s) Oral every 8 hours  gabapentin 600 milliGRAM(s) Oral three times a day  methocarbamol 750 milliGRAM(s) Oral every 6 hours  pantoprazole  Injectable 40 milliGRAM(s) IV Push two times a day    MEDICATIONS  (PRN):  HYDROmorphone  Injectable 0.5 milliGRAM(s) IV Push every 6 hours PRN Severe Pain (7 - 10)  oxycodone    5 mG/acetaminophen 325 mG 2 Tablet(s) Oral every 4 hours PRN Moderate Pain (4 - 6)      PHYSICAL EXAM  GENERAL: seen at bedside; moderate distress; well developed, disheveled; tremulous    HEENT: head atraumatic, normocephalic; anicteric; speech clear, rapid, escalating, catastrophizing and loquacious  NEURO: A + O X 3; poor concentration; Cranial Nerves II- XII intact; SILT except + numbness over R hip/ buttock area ecchymosis   RESPIRATORY: lungs clear to auscultation B/L; no rales or rhonchi   CARDIAC: regular cardiac rhythm S1 S2; no JVD  GI: abdomen soft, slightly distended; + bowel sounds; last BM yesterday; tolerating food and liquids  : no CVA tenderness; voiding  EXTREMITIES/ PV: VOSS; 2+ peripheral pulses; no cyanosis, edema or clubbing  MUSCULOSKELETAL: motor strength 5/5 B/L lower extremities; gait not assessed; independent walker  SKIN: no rashes, lesions; extensive ecchymosis on lower abdomen, B/L medial thighs and around to R hip and buttock area postop; LAURITA X 2 in pubic area- L serosanguineous and R appearing as gerard blood  PSYCH: affect broad; mood angry, then sad, then tense and cycling; poor eye contact; appears depressed and anxious- stated "I want to be sedated or I may as well go home and die"    Vital Signs:  T(C): 36.6 (06-07-22 @ 15:31)  HR: 91 (06-07-22 @ 15:31)  BP: 116/84 (06-07-22 @ 15:31)  RR: 19 (06-07-22 @ 15:31)  SpO2: 100% (06-07-22 @ 15:31)    Pertinent labs/radiology:                        8.5    7.25  )-----------( 293      ( 07 Jun 2022 07:11 )             26.3     06-06    139  |  99  |  6<L>  ----------------------------<  106<H>  4.0   |  29  |  0.54    Ca    9.4      06 Jun 2022 17:53    TPro  6.4  /  Alb  3.7  /  TBili  0.4  /  DBili  x   /  AST  15  /  ALT  6<L>  /  AlkPhos  49  06-06    from: CT Abdomen and Pelvis w/ IV Cont (06.06.22 @ 22:37)   IMPRESSION:  No evidence of retroperitoneal hemorrhage. Limited assessment for   gastrointestinal bleeding.  Grossly unchanged anterior abdominal wall postsurgical changes with   subcutaneous edema, air and small layering hematomas, right greater than   left. No drainable collection.    6/7/22 Urine toxicology immunoassay + opiates + oxycodone + THC and + benzodiazepines

## 2022-06-07 NOTE — H&P ADULT - NSHPLABSRESULTS_GEN_ALL_CORE
Labs and imaging data obtained by the ED personally reviewed.                        8.5    7.25  )-----------( 293      ( 07 Jun 2022 07:11 )             26.3     06-06  139  |  99  |  6<L>  ----------------------------<  106<H>  4.0   |  29  |  0.54    Ca    9.4      06 Jun 2022 17:53    TPro  6.4  /  Alb  3.7  /  TBili  0.4  /  DBili  x   /  AST  15  /  ALT  6<L>  /  AlkPhos  49  06-06    PT/INR - ( 06 Jun 2022 17:53 )   PT: 11.4 sec;   INR: 0.99 ratio    PTT - ( 06 Jun 2022 17:53 )  PTT:37.5 sec    lipase = 29  HCG < 1    COVID 19 PCR Not detected    ECG: NSR, QTc = 437    CXR as reported: Clear lungs.    CTA Chest w/ IV contrast (PE Protocol) as reported: No pulmonary embolism to the segmental branches. Limited visualization of subsegmental branches secondary to respiratory motion. No acute pathology.    CT A/P w/ IV contrast on 6/5 as reported: No acute pathology. Postsurgical changes in the anterior abdominal wall without drainable collection.    CT A/P w/ IV contrast on 6/6 as reported: No evidence of retroperitoneal hemorrhage. Limited assessment for gastrointestinal bleeding. Grossly unchanged anterior abdominal wall postsurgical changes with subcutaneous edema, air and small layering hematomas, right greater than left. No drainable collection.

## 2022-06-07 NOTE — DISCHARGE NOTE PROVIDER - NSDCMRMEDTOKEN_GEN_ALL_CORE_FT
clindamycin 300 mg oral capsule: 1 cap(s) orally every 6 hours  Dilaudid 2 mg oral tablet: 1 tab(s) orally 3 times a day as needed for Breakthrough pain  ferrous sulfate:   Flexeril 10 mg oral tablet: 1 tab(s) orally 3 times a day  gabapentin 600 mg oral tablet: 1 tab(s) orally 4 times a day    NOTE: LOCAL [facility] HAS SCRIPT FILLED FOR 3 TIMES DAY DOSING  Percocet 10/325 oral tablet: 1 tab(s) orally every 6 hours, As Needed    NOTE: PATIENT STATES JUST TAKING &quot;OXYCODONE&quot; BUT LOCAL PHARMACY HAS ONLY EVER FILLED PERCOCET  Vitamin C:

## 2022-06-07 NOTE — H&P ADULT - PROBLEM SELECTOR PLAN 2
- unclear baseline but patient reports not having a history of anemia except when post-operatively in 2020, but she was tonya-operatively prescribed iron supplementation which she has been taking as recommended  - s/p a total of 2units PRBC on 6/5 and last night  - trend H&H closely, monitor hemodynamics  - s/p PPI 80mg IVP x1 in ED, will continue w/ PPI 40mg IV BID for now  - monitor stool count  - will appreciate GI's evaluation and recommendations

## 2022-06-07 NOTE — ED PROVIDER NOTE - SIGNIFICANT NEGATIVE FINDINGS
no headache, no chest pain, no SOB, no palpitations, no n/v/d, no urinary symptoms, no fevers, no chills,  no neuro changes.

## 2022-06-07 NOTE — DISCHARGE NOTE PROVIDER - CARE PROVIDERS DIRECT ADDRESSES
,[email or web address],DirectAddress_Unknown,DirectAddress_Unknown,DirectAddress_Unknown

## 2022-06-07 NOTE — ED ADULT NURSE REASSESSMENT NOTE - NS ED NURSE REASSESS COMMENT FT1
Spoke with MD Gonsales regarding PT's concern about her left drain. PT c/o pain, inflammation, and that the drain feels more "pushed in" than the right drain. MD Gonsales states she will come down to see the PT.

## 2022-06-07 NOTE — DISCHARGE NOTE PROVIDER - HOSPITAL COURSE
PATIENT LEFT AMA    26yoF w/ PMHx of Marysol-Danlos, stage 4 endometriosis, fibromyalgia, chronic back pain / chronic opioid use (follows w/ Dr. Azra Caldera as O/P per ISTOP Reference in chart), who presents to ED s/p extended abdominoplasty on 6/2/22 (per plastic surgery, Dr. Hiwot Pop) d/t chronic panniculitis and loose hanging skin causing lower back pain, w/ severe abdominal pain and 1 episode of dark stool, found to be anemic (she was seen at [place]'s ED on 6/5, D/Ephraim after PRBC transfusion and imaging revealing non-drainable abdominal wall hematoma, w/ instructions to stop Lovenox for post-op PPx, which she self-administered (location: thighs) for 3 days, and re-presents from Dr. Pop's office to [facility]'s ED on 6/7).     Problem/Plan - 1:  ·  Problem: Abdominal pain.   ·  Plan: - etiology likely related to recent extended abdominoplasty on 6/2/22 (per plastic surgery, Dr. Hiwot Pop) but DDx also includes GI bleed given one episode of dark stools and anemia as below  - CT A/P w/ IV contrast on 6/5 as reported: No acute pathology. Postsurgical changes in the anterior abdominal wall without drainable collection.  - appreciate plastic surgery / Dr. Pop's evaluation and recommendations: "Although I did not see any abdominal exam abnormalities outside of the typical perioperative standpoint, I was unable to properly examine her due to pain complaints. LAURITA drains were always serosanguineous... repeat CT a/p unremarkable and unchanged from prior exam  - GI consult emailed on 6/7  - analgesia regimen as below  - serial abdominal examinations  - fall precautions.     Problem/Plan - 2:  ·  Problem: Acute blood loss anemia.   ·  Plan: - unclear baseline but patient reports not having a history of anemia except when post-operatively in 2020, but she was tonya-operatively prescribed iron supplementation which she has been taking as recommended  - s/p a total of 2units PRBC on 6/5 and last night  - trend H&H closely, monitor hemodynamics  - s/p PPI 80mg IVP x1 in ED, will continue w/ PPI 40mg IV BID for now  - monitor stool count  - will appreciate GI's evaluated and planned for EGD in am     Problem/Plan - 3:  ·  Problem: Chronic, continuous use of opioids.   ·  Plan: - ISTOP reference in chart  - f/u [facility]  - as O/P it appears patient has filled recent Rx (per Dr. Azra Caldera) for chronic use of Oxycodone-Acetaminophen  Q6H PRN and ?breakthrough Rx w/ Hydromorphone 2mg PO TID PRN  - for now, pending repeat H&H, will convert to IV regimen w/ IV Dilaudid 1mg Q4H PRN for severe pain, continuing home Gabapentin TID and Flexeril  - if patient remains stable and can tolerate PO, will convert to PO regimen   - chronic pain management consulted, will appreciate their recommendations.    Patient did not want to stay. She is A&O x 3. She was explained the risk of leaving in front of staff an her male  at bedside. She signed AMA form. Attending made aware.      PATIENT LEFT AMA    26yoF w/ PMHx of Marysol-Danlos, stage 4 endometriosis, fibromyalgia, chronic back pain / chronic opioid use (follows w/ Dr. Azra Caldera as O/P per ISTOP Reference in chart), who presents to ED s/p extended abdominoplasty on 6/2/22 (per plastic surgery, Dr. Hiwot Pop) d/t chronic panniculitis and loose hanging skin causing lower back pain, w/ severe abdominal pain and 1 episode of dark stool, found to be anemic (she was seen at [place]'s ED on 6/5, D/Ephraim after PRBC transfusion and imaging revealing non-drainable abdominal wall hematoma, w/ instructions to stop Lovenox for post-op PPx, which she self-administered (location: thighs) for 3 days, and re-presents from Dr. Pop's office to [facility]'s ED on 6/7).    Abdominal pain.   - etiology likely related to recent extended abdominoplasty on 6/2/22 (per plastic surgery, Dr. Hiwot Pop) but DDx also includes GI bleed given one episode of dark stools and anemia as below  - CT A/P w/ IV contrast on 6/5 as reported: No acute pathology. Postsurgical changes in the anterior abdominal wall without drainable collection.  - appreciate plastic surgery / Dr. Pop's evaluation and recommendations: "Although I did not see any abdominal exam abnormalities outside of the typical perioperative standpoint, I was unable to properly examine her due to pain complaints. LAURITA drains were always serosanguineous... repeat CT a/p unremarkable and unchanged from prior exam  - GI consult emailed on 6/7 - plan was for EGD on 6/8  - analgesia regimen per chronic pain management  - serial abdominal examinations  - fall precautions.    Acute blood loss anemia.   - unclear baseline but patient reports not having a history of anemia except when post-operatively in 2020, but she was tonya-operatively prescribed iron supplementation which she has been taking as recommended  - s/p a total of 2units PRBC on 6/5 and last night  - trend H&H closely, monitor hemodynamics  - s/p PPI 80mg IVP x1 in ED, will continue w/ PPI 40mg IV BID for now  - monitor stool count  - will appreciate GI's evaluated and planned for EGD in am    Chronic, continuous use of opioids.   - ISTOP reference in chart  - f/u [facility]  - as O/P it appears patient has filled recent Rx (per Dr. Azra Caldera) for chronic use of Oxycodone-Acetaminophen  Q6H PRN and ?breakthrough Rx w/ Hydromorphone 2mg PO TID PRN  - chronic pain management w/ analgesia regimen recommendations    Patient did not want to stay. She is A&O x 3. She was explained the risks of leaving in front of staff and her male  at bedside and endorsed comprehension. She signed AMA form. Attending made aware.

## 2022-06-07 NOTE — CHART NOTE - NSCHARTNOTEFT_GEN_A_CORE
27 yo Female Pmhx  Endometriosis/ PCOS pt is chronic pain  sees Dr Azra Caldera Physiatrist in [place]. She takes oxycodone 10mg qid and dialudid 2mg po tid.    This morning nurse notified at 5: 30 a.m patient stated she had unwitnessed fall. She denies head strike, Patient states she got of the stretcher and fell her right hip hit the commode.   Patient in 10/10 pain she had recent abdominoplasty  5 days ago with  2 Naseem Lehman drains in place  Unable to test right hip flexion/ extension range of motion secondary to pain and recent surgical site her  Limb lengths are equal  Patient is AAOX3 with capacity. She is refusing any xray hip/pelvis imaging to rule out any fracture dislocations  Patients VSS. Last IV  dilaudid received at 1a.m    IV Dailudid 1mg STAT ORDERED.  Patient will need pain management consult called this morning. 25 yo Female Pmhx  Endometriosis/ PCOS pt is chronic pain  sees Dr Azra Caldera Physiatrist in [place]. She takes oxycodone 10mg qid and dilaudid 2mg po tid.  She received earlier in the shift valium 5mg x2, dilaudid IVP.    This morning nurse notified at 5: 30 a.m patient stated she had unwitnessed fall. She denies head strike, Patient states she got of the stretcher and fell her right hip hit the commode.   Patient in 10/10 pain she had recent abdominoplasty  5 days ago with  2 Naseem Lehman drains in place  Unable to test right hip flexion/ extension range of motion secondary to pain and recent surgical site her  Limb lengths are equal  Patient is AAOX3 with capacity. She is refusing any xray hip/pelvis imaging to rule out any fracture dislocations  Patients VSS. Last IV  dilaudid received at 1a.m    IV Dailudid 1mg STAT ORDERED.  Patient will need pain management consult called this morning.

## 2022-06-07 NOTE — CONSULT NOTE ADULT - ASSESSMENT
#Anemia  #Reported Melena  Suspect drop of hb multifactorial in setting of recent bleeding (before surgery Hb 15) from surgical complication. Given reported melena, reasonable to evaluate endoscopically   to assess for Peptic Ulcer disease from H pylori vs NSAIDs use, Dieulafoy lesion,, gastritis,  and angiodysplasia.    Recommendations:  - Keep on clear, make NPO after midnight for EGD tomorrow  - Trend CBC q8h  - Maintain active type & screen  - Transfuse to maintain Hbg > 7.0 or > 8 inpatient with pre-existing cardiovascular conditions.  - Plt goal > 50  - Maintain access with 2 x Large bore IV  - Continue Pantoprazole 40 mg IV q12H    Recommendations preliminary until signed by attending.     Saúl Wright MD  Gastroenterology/Hepatology Fellow  1st option: [phone] (text or call), ONLY available from 7:00 am to 5:00 pm.   **Contact on-call GI fellow via answering service ([phone]) from 5pm-7am AND on weekends/holidays**  2nd option: Available via Microsoft Teams  3rd option: Pager: [phone]         #Anemia  #Reported Melena  Suspect drop of hb multifactorial in setting of recent bleeding (before surgery Hb 15). Given reported melena, reasonable to evaluate endoscopically   to assess for Peptic Ulcer disease from H pylori vs NSAIDs use, Dieulafoy lesion,, gastritis,  and angiodysplasia.    Recommendations:  - Keep on clear, make NPO after midnight for EGD tomorrow  - Trend CBC q8h  - Maintain active type & screen  - Transfuse to maintain Hbg > 7.0 or > 8 inpatient with pre-existing cardiovascular conditions.  - Plt goal > 50  - Maintain access with 2 x Large bore IV  - Continue Pantoprazole 40 mg IV q12H    Recommendations preliminary until signed by attending.     Saúl Wright MD  Gastroenterology/Hepatology Fellow  1st option: [phone] (text or call), ONLY available from 7:00 am to 5:00 pm.   **Contact on-call GI fellow via answering service ([phone]) from 5pm-7am AND on weekends/holidays**  2nd option: Available via Microsoft Teams  3rd option: Pager: [phone]

## 2022-06-07 NOTE — ED ADULT NURSE REASSESSMENT NOTE - NS ED NURSE REASSESS COMMENT FT1
Report received from MADYSON Gonzalez. PT is resting comfortably sitting up in bed, breathing unlabored, and speaking in complete sentences. PT currently rates her pain a 10/10 but was medicated with Dilaudid at 06:22 AM and is not due for more pain medication at this time. PT states "my pain will never not be a 10." Updated PT on plan of care. Awaiting a bed in telemetry. Safety and comfort maintained. Call bell within reach. PT's boyfriend at the bedside. Report received from MADYSON Gonzalez. PT is resting comfortably sitting up in bed, breathing unlabored, and speaking in complete sentences. PT currently rates her pain a 10/10 but was medicated with Dilaudid at 06:22 AM and is not due for more pain medication at this time. PT states "my pain will never not be a 10." PT on cardiac monitor, NSR. Updated PT on plan of care. Awaiting a bed in telemetry. Safety and comfort maintained. Call bell within reach. PT's boyfriend at the bedside.

## 2022-06-07 NOTE — DISCHARGE NOTE PROVIDER - NSDCCPCAREPLAN_GEN_ALL_CORE_FT
PRINCIPAL DISCHARGE DIAGNOSIS  Diagnosis: GI bleed  Assessment and Plan of Treatment: you were to have an EGD 6/8/2022 but you signed out AMA  you can return at any time . Notify your PCP tonight      SECONDARY DISCHARGE DIAGNOSES  Diagnosis: Acute blood loss anemia  Assessment and Plan of Treatment: Enteryou signed out AMA shared details here, e.g., 'Noted on CXR 1/1/16.'    Diagnosis: Abdominal pain  Assessment and Plan of Treatment: patient signed out AMA follow up with plastic surgeon in am , you can return to the hospital at any time. LAURITA drain care as previously instructed

## 2022-06-07 NOTE — DISCHARGE NOTE PROVIDER - PROVIDER TOKENS
PROVIDER:[TOKEN:[[id number]],ESTABLISHEDPATIENT:[T]],PROVIDER:[TOKEN:[[id number]],ESTABLISHEDPATIENT:[T]],FREE:[LAST:[Dr Caldera, pain management],FIRST:[M.],PHONE:[(   )    -],FAX:[(   )    -],ESTABLISHEDPATIENT:[T]],FREE:[LAST:[plastic surgeon],PHONE:[(   )    -],FAX:[(   )    -],ESTABLISHEDPATIENT:[T]]

## 2022-06-07 NOTE — ED PROVIDER NOTE - NS ED MD DISPO ADMIT SY
Respiratory Care Protocol Assessment    Respiratory assessment completed, pathway(s) are indicated per the following triggers:  Aerosolized Medication: Yes  FEV1/FVC%: 50-69%  Volume Expansion: Yes  Breath Sounds: Diminished        Oxygen Therapy: Yes  FiO2: Less than 40% or less that 6 lpm      Recommendations: Will order EZPA w/Duoneb  per  Acuity Score: 10   they will be scheduled   Acuity Frequency: 3x daily and prn.    Additional comments:      Goal: Return to current home regimen        MED

## 2022-06-07 NOTE — H&P ADULT - ASSESSMENT
26yoF w/ PMHx of Marysol-Danlos, stage 4 endometriosis, fibromyalgia, chronic back pain / chronic opioid use (follows w/ Dr. Azra Caldera as O/P per ISTOP Reference in chart), who presents to ED s/p extended abdominoplasty on 6/2/22 (per plastic surgery, Dr. Hiwot Pop) d/t chronic panniculitis and loose hanging skin causing lower back pain, w/ severe abdominal pain and 1 episode of dark stool, found to be anemic (she was seen at [facility] ED on 6/5, D/Ephraim after PRBC transfusion and imaging revealing non-drainable abdominal wall hematoma, w/ instructions to stop Lovenox for post-op PPx, which she self-administered (location: thighs) for 3 days, and re-presents from Dr. Pop's office to [facility]'s ED on 6/7).

## 2022-06-07 NOTE — CONSULT NOTE ADULT - ATTENDING COMMENTS
26F w extensive pmhx including lexii danlos, endometriosis, chronic pain on opioids, s/p recent abdominoplasty 2/2 chronic panniculitis w/ drain in place w/ serosanguinous fluid and imaging showing abdominal wall hematoma, now admitted w/ abdominal pain and ?vaginal  bleeding, GI consulted for reports of melena and labs showing acute anemia. She was on lovenox in post op period. Presented initially to OSH, s/p prbc transfusion and d/c home. Referred to ED after outpatient visit w/ her surgeon. No further episodes of dark stools since arrival to ED. Declines rectal exam.     - Recommend trending h/h, transfuse for goal > 7  - IV PPI BID   - monitor bowel movements  - plan for EGD tomorrow to r/o PUD or other intraluminal upper GI source   - ok to start clear liquid diet, npo at MN   - rest of care per primary team   - GI to follow, please call with questions. 1-2 1

## 2022-06-07 NOTE — CHART NOTE - NSCHARTNOTEFT_GEN_A_CORE
Patient Name: Cynthia Awad Date: 1995  Address: [address] [place] [id number]: Female  Rx Written	Rx Dispensed	Drug	Quantity	Days Supply	Prescriber Name	Prescriber Jeanine #	Payment Method	Dispenser  02/03/2022	02/08/2022	oxycodone-acetaminophen  mg tab	120	30	Azra Caldera	[id number]	Cash	[facility] #[id number]  01/28/2022	01/30/2022	oxycodone-acetaminophen  mg tab	28	7	Azra Caldera	[id number]	Insurance	[facility] #[id number]  01/21/2022	01/21/2022	oxycodone-acetaminophen  mg tab	28	7	Azra Caldera	[id number]	Insurance	[facility] #[id number]  01/04/2022	01/06/2022	dextroamp-amphetamine 5 mg tab	30	15	Sandee Bhagat S, PA	[id number]	Insurance	[facility] #[id number]  12/13/2021	12/20/2021	oxycodone-acetaminophen  mg tab	120	30	Azra Caldera	[id number]	Insurance	[facility] #[id number]  12/07/2021	12/12/2021	dextroamp-amphetamine 5 mg tab	40	20	Sandee Bhagat S, PA	[id number]	Insurance	[facility] #[id number]  11/15/2021	11/22/2021	oxycodone-acetaminophen  mg tab	90	30	Azra Caldera	[id number]	Insurance	[facility] #[id number]  11/09/2021	11/14/2021	dextroamp-amphetamine 5 mg tab	30	30	Sandee Bhagat S, PA	[id number]	Insurance	[facility] #[id number]  10/25/2021	10/27/2021	dextroamp-amphetamine 5 mg tab	14	14	Sandee Bhagat S, PA	[id number]	Insurance	[facility] #[id number]  10/19/2021	10/21/2021	oxycodone-acetaminophen  mg tab	90	30	Kavin Goldberg MD	[id number]	Insurance	[facility] #[id number]  09/23/2021	09/24/2021	oxycodone-acetaminophen  mg tab	90	30	Azra Caldera	[id number]	Insurance	[facility] #[id number]  08/25/2021	08/26/2021	oxycodone-acetaminophen  mg tab	90	30	Kavin Goldberg MD	[id number]	Insurance	[facility] #[id number]  07/19/2021	07/30/2021	oxycodone-acetaminophen  mg tab	90	30	Azra Caldera	[id number]	Insurance	[facility] #[id number]  06/28/2021	07/01/2021	oxycodone-acetaminophen  mg tab	90	30	Azra Caldera	[id number]	Insurance	[facility] #[id number]

## 2022-06-07 NOTE — PROGRESS NOTE ADULT - SUBJECTIVE AND OBJECTIVE BOX
Cynthia Ellison is a 26 year old female s/p extended abdominoplasty 2/2 chronic panniculitis, loose hanging skin with subsequent lower back pain 2/2 Marysol Danlos and pannus on 6/2/22.      Preoperatively patient was seen by her pain management physician prior to surgery and gave recommendations for pain management postoperatively.  Patient was also seen by her  preoperatively and patient was cleared for surgery, as she does not have the vascular subtype of Ehler Danlos.     Patient was seen on 06/03/22 (POD1), no issues or concerns.     On Saturday, 6/4/22, after discussion with the patient with complaints of SOB, HR in the 130's, with leg swelling and pain.  Patient was taking Lovenox 30mg subQ QD.  I sent patient to go to the ED to r/o PE/DVT and get lab work.  Patient did not go as directed until 6/5 when I was able to get back into contact with her.  On 6/5 she went to [facility] ED. Patient received 1UPRBC,  CTA negative for PE, CT A/P unremarkable (typical postoperative changes), and US LE without DVT.     6/6: Patient was seen in my office. Patient stated that she had supraumbilical abdominal pain, weakness, numbness, and melena (photo shown to me on her phone). Vitals in office were wnl.   I advised patient to go back to the ED, due to her complaints of melena and abdominal pain.  Although I did not see any abdominal exam abnormalities outside of the typical perioperative standpoint, I was unable to properly examine her due to pain complaints. LAURITA drains were always serosanguineous.  Patient received repeat CT a/p due to increased complaints of abdominal pain in the ED.     A/P: 26F s/p extended abdominoplasty for panniculitis and lower back pain, POD5, with pain and anemia    - Discussion with ED team about patient's current status and treatment.  Admitted for pain control and monitoring  - patient with reported unwitnessed fall in ED to right hip, refusing intervention at this time.   - Recommend GI evaluation due to patient's reported melena and anemia. Trend H/H  - repeat CT a/p unremarkable and unchanged from prior exam  - will follow closely. Please contact me for any questions thank you,     [phone]

## 2022-06-07 NOTE — ED ADULT NURSE REASSESSMENT NOTE - NS ED NURSE REASSESS COMMENT FT1
pt very upset. only wants the medications she normally takes. md aware of all results and pt informed of same. to be admitted when bed available. will continue to monitor

## 2022-06-08 ENCOUNTER — TRANSCRIPTION ENCOUNTER (OUTPATIENT)
Age: 27
End: 2022-06-08

## 2022-06-08 VITALS
OXYGEN SATURATION: 100 % | RESPIRATION RATE: 18 BRPM | TEMPERATURE: 98 F | HEART RATE: 100 BPM | SYSTOLIC BLOOD PRESSURE: 102 MMHG | DIASTOLIC BLOOD PRESSURE: 69 MMHG

## 2022-06-08 DIAGNOSIS — K92.2 GASTROINTESTINAL HEMORRHAGE, UNSPECIFIED: ICD-10-CM

## 2022-06-08 DIAGNOSIS — D62 ACUTE POSTHEMORRHAGIC ANEMIA: ICD-10-CM

## 2022-06-08 PROBLEM — Q79.60 EHLERS-DANLOS SYNDROME, UNSPECIFIED: Chronic | Status: ACTIVE | Noted: 2022-06-07

## 2022-06-08 PROBLEM — M79.7 FIBROMYALGIA: Chronic | Status: ACTIVE | Noted: 2022-06-07

## 2022-06-08 PROBLEM — N80.9 ENDOMETRIOSIS, UNSPECIFIED: Chronic | Status: ACTIVE | Noted: 2022-06-07

## 2022-06-08 LAB
ANION GAP SERPL CALC-SCNC: 7 MMOL/L — SIGNIFICANT CHANGE UP (ref 5–17)
APPEARANCE UR: CLEAR — SIGNIFICANT CHANGE UP
BASOPHILS # BLD AUTO: 0.02 K/UL — SIGNIFICANT CHANGE UP (ref 0–0.2)
BASOPHILS NFR BLD AUTO: 0.2 % — SIGNIFICANT CHANGE UP (ref 0–2)
BILIRUB UR-MCNC: NEGATIVE — SIGNIFICANT CHANGE UP
BUN SERPL-MCNC: 7 MG/DL — SIGNIFICANT CHANGE UP (ref 7–23)
CALCIUM SERPL-MCNC: 9.2 MG/DL — SIGNIFICANT CHANGE UP (ref 8.4–10.5)
CHLORIDE SERPL-SCNC: 101 MMOL/L — SIGNIFICANT CHANGE UP (ref 96–108)
CO2 SERPL-SCNC: 29 MMOL/L — SIGNIFICANT CHANGE UP (ref 22–31)
COLOR SPEC: YELLOW — SIGNIFICANT CHANGE UP
CREAT SERPL-MCNC: 0.49 MG/DL — LOW (ref 0.5–1.3)
DIFF PNL FLD: NEGATIVE — SIGNIFICANT CHANGE UP
EGFR: 134 ML/MIN/1.73M2 — SIGNIFICANT CHANGE UP
EOSINOPHIL # BLD AUTO: 0.2 K/UL — SIGNIFICANT CHANGE UP (ref 0–0.5)
EOSINOPHIL NFR BLD AUTO: 2.4 % — SIGNIFICANT CHANGE UP (ref 0–6)
GLUCOSE SERPL-MCNC: 89 MG/DL — SIGNIFICANT CHANGE UP (ref 70–99)
GLUCOSE UR QL: NEGATIVE MG/DL — SIGNIFICANT CHANGE UP
HCT VFR BLD CALC: 28.5 % — LOW (ref 34.5–45)
HCT VFR BLD CALC: 29.4 % — LOW (ref 34.5–45)
HGB BLD-MCNC: 9.2 G/DL — LOW (ref 11.5–15.5)
HGB BLD-MCNC: 9.6 G/DL — LOW (ref 11.5–15.5)
IMM GRANULOCYTES NFR BLD AUTO: 1 % — SIGNIFICANT CHANGE UP (ref 0–1.5)
KETONES UR-MCNC: NEGATIVE — SIGNIFICANT CHANGE UP
LEUKOCYTE ESTERASE UR-ACNC: NEGATIVE — SIGNIFICANT CHANGE UP
LYMPHOCYTES # BLD AUTO: 2.16 K/UL — SIGNIFICANT CHANGE UP (ref 1–3.3)
LYMPHOCYTES # BLD AUTO: 25.7 % — SIGNIFICANT CHANGE UP (ref 13–44)
MCHC RBC-ENTMCNC: 30.6 PG — SIGNIFICANT CHANGE UP (ref 27–34)
MCHC RBC-ENTMCNC: 30.9 PG — SIGNIFICANT CHANGE UP (ref 27–34)
MCHC RBC-ENTMCNC: 32.3 GM/DL — SIGNIFICANT CHANGE UP (ref 32–36)
MCHC RBC-ENTMCNC: 32.7 GM/DL — SIGNIFICANT CHANGE UP (ref 32–36)
MCV RBC AUTO: 94.5 FL — SIGNIFICANT CHANGE UP (ref 80–100)
MCV RBC AUTO: 94.7 FL — SIGNIFICANT CHANGE UP (ref 80–100)
MONOCYTES # BLD AUTO: 0.56 K/UL — SIGNIFICANT CHANGE UP (ref 0–0.9)
MONOCYTES NFR BLD AUTO: 6.7 % — SIGNIFICANT CHANGE UP (ref 2–14)
NEUTROPHILS # BLD AUTO: 5.4 K/UL — SIGNIFICANT CHANGE UP (ref 1.8–7.4)
NEUTROPHILS NFR BLD AUTO: 64 % — SIGNIFICANT CHANGE UP (ref 43–77)
NITRITE UR-MCNC: NEGATIVE — SIGNIFICANT CHANGE UP
NRBC # BLD: 0 /100 WBCS — SIGNIFICANT CHANGE UP (ref 0–0)
NRBC # BLD: 0 /100 WBCS — SIGNIFICANT CHANGE UP (ref 0–0)
PH UR: 7 — SIGNIFICANT CHANGE UP (ref 5–8)
PLATELET # BLD AUTO: 335 K/UL — SIGNIFICANT CHANGE UP (ref 150–400)
PLATELET # BLD AUTO: 374 K/UL — SIGNIFICANT CHANGE UP (ref 150–400)
POTASSIUM SERPL-MCNC: 4.2 MMOL/L — SIGNIFICANT CHANGE UP (ref 3.5–5.3)
POTASSIUM SERPL-SCNC: 4.2 MMOL/L — SIGNIFICANT CHANGE UP (ref 3.5–5.3)
PROT UR-MCNC: NEGATIVE MG/DL — SIGNIFICANT CHANGE UP
RBC # BLD: 3.01 M/UL — LOW (ref 3.8–5.2)
RBC # BLD: 3.11 M/UL — LOW (ref 3.8–5.2)
RBC # FLD: 13.9 % — SIGNIFICANT CHANGE UP (ref 10.3–14.5)
RBC # FLD: 13.9 % — SIGNIFICANT CHANGE UP (ref 10.3–14.5)
SODIUM SERPL-SCNC: 137 MMOL/L — SIGNIFICANT CHANGE UP (ref 135–145)
SP GR SPEC: 1 — LOW (ref 1.01–1.02)
UROBILINOGEN FLD QL: NEGATIVE MG/DL — SIGNIFICANT CHANGE UP
WBC # BLD: 8.42 K/UL — SIGNIFICANT CHANGE UP (ref 3.8–10.5)
WBC # BLD: 9.65 K/UL — SIGNIFICANT CHANGE UP (ref 3.8–10.5)
WBC # FLD AUTO: 8.42 K/UL — SIGNIFICANT CHANGE UP (ref 3.8–10.5)
WBC # FLD AUTO: 9.65 K/UL — SIGNIFICANT CHANGE UP (ref 3.8–10.5)

## 2022-06-08 PROCEDURE — 86901 BLOOD TYPING SEROLOGIC RH(D): CPT

## 2022-06-08 PROCEDURE — 99221 1ST HOSP IP/OBS SF/LOW 40: CPT

## 2022-06-08 PROCEDURE — 81003 URINALYSIS AUTO W/O SCOPE: CPT

## 2022-06-08 PROCEDURE — 99285 EMERGENCY DEPT VISIT HI MDM: CPT

## 2022-06-08 PROCEDURE — 80053 COMPREHEN METABOLIC PANEL: CPT

## 2022-06-08 PROCEDURE — 96375 TX/PRO/DX INJ NEW DRUG ADDON: CPT

## 2022-06-08 PROCEDURE — 74176 CT ABD & PELVIS W/O CONTRAST: CPT | Mod: MA

## 2022-06-08 PROCEDURE — 96374 THER/PROPH/DIAG INJ IV PUSH: CPT

## 2022-06-08 PROCEDURE — 85730 THROMBOPLASTIN TIME PARTIAL: CPT

## 2022-06-08 PROCEDURE — 85025 COMPLETE CBC W/AUTO DIFF WBC: CPT

## 2022-06-08 PROCEDURE — 93010 ELECTROCARDIOGRAM REPORT: CPT

## 2022-06-08 PROCEDURE — 36415 COLL VENOUS BLD VENIPUNCTURE: CPT

## 2022-06-08 PROCEDURE — 86850 RBC ANTIBODY SCREEN: CPT

## 2022-06-08 PROCEDURE — 87635 SARS-COV-2 COVID-19 AMP PRB: CPT

## 2022-06-08 PROCEDURE — 93005 ELECTROCARDIOGRAM TRACING: CPT

## 2022-06-08 PROCEDURE — 85027 COMPLETE CBC AUTOMATED: CPT

## 2022-06-08 PROCEDURE — 84702 CHORIONIC GONADOTROPIN TEST: CPT

## 2022-06-08 PROCEDURE — 96361 HYDRATE IV INFUSION ADD-ON: CPT

## 2022-06-08 PROCEDURE — 85610 PROTHROMBIN TIME: CPT

## 2022-06-08 PROCEDURE — 86900 BLOOD TYPING SEROLOGIC ABO: CPT

## 2022-06-08 PROCEDURE — 80048 BASIC METABOLIC PNL TOTAL CA: CPT

## 2022-06-08 RX ORDER — PANTOPRAZOLE SODIUM 20 MG/1
40 TABLET, DELAYED RELEASE ORAL
Refills: 0 | Status: DISCONTINUED | OUTPATIENT
Start: 2022-06-08 | End: 2022-06-08

## 2022-06-08 RX ORDER — CYCLOBENZAPRINE HYDROCHLORIDE 10 MG/1
1 TABLET, FILM COATED ORAL
Qty: 0 | Refills: 0 | DISCHARGE
Start: 2022-06-08

## 2022-06-08 RX ORDER — HEPARIN SODIUM 5000 [USP'U]/ML
5000 INJECTION INTRAVENOUS; SUBCUTANEOUS EVERY 8 HOURS
Refills: 0 | Status: DISCONTINUED | OUTPATIENT
Start: 2022-06-08 | End: 2022-06-08

## 2022-06-08 RX ORDER — DIAZEPAM 5 MG
5 TABLET ORAL ONCE
Refills: 0 | Status: DISCONTINUED | OUTPATIENT
Start: 2022-06-08 | End: 2022-06-08

## 2022-06-08 RX ORDER — GABAPENTIN 400 MG/1
600 CAPSULE ORAL
Refills: 0 | Status: DISCONTINUED | OUTPATIENT
Start: 2022-06-08 | End: 2022-06-08

## 2022-06-08 RX ORDER — CYCLOBENZAPRINE HYDROCHLORIDE 10 MG/1
10 TABLET, FILM COATED ORAL THREE TIMES A DAY
Refills: 0 | Status: DISCONTINUED | OUTPATIENT
Start: 2022-06-08 | End: 2022-06-08

## 2022-06-08 RX ORDER — CYCLOBENZAPRINE HYDROCHLORIDE 10 MG/1
1 TABLET, FILM COATED ORAL
Qty: 0 | Refills: 0 | DISCHARGE

## 2022-06-08 RX ORDER — ASCORBIC ACID 60 MG
0 TABLET,CHEWABLE ORAL
Qty: 0 | Refills: 0 | DISCHARGE

## 2022-06-08 RX ORDER — HYDROMORPHONE HYDROCHLORIDE 2 MG/ML
1 INJECTION INTRAMUSCULAR; INTRAVENOUS; SUBCUTANEOUS EVERY 4 HOURS
Refills: 0 | Status: DISCONTINUED | OUTPATIENT
Start: 2022-06-08 | End: 2022-06-08

## 2022-06-08 RX ORDER — INFLUENZA VIRUS VACCINE 15; 15; 15; 15 UG/.5ML; UG/.5ML; UG/.5ML; UG/.5ML
0.5 SUSPENSION INTRAMUSCULAR ONCE
Refills: 0 | Status: DISCONTINUED | OUTPATIENT
Start: 2022-06-08 | End: 2022-06-08

## 2022-06-08 RX ADMIN — HYDROMORPHONE HYDROCHLORIDE 1 MILLIGRAM(S): 2 INJECTION INTRAMUSCULAR; INTRAVENOUS; SUBCUTANEOUS at 18:37

## 2022-06-08 RX ADMIN — Medication 5 MILLIGRAM(S): at 09:43

## 2022-06-08 RX ADMIN — GABAPENTIN 600 MILLIGRAM(S): 400 CAPSULE ORAL at 13:00

## 2022-06-08 RX ADMIN — HYDROMORPHONE HYDROCHLORIDE 1 MILLIGRAM(S): 2 INJECTION INTRAMUSCULAR; INTRAVENOUS; SUBCUTANEOUS at 11:12

## 2022-06-08 RX ADMIN — HYDROMORPHONE HYDROCHLORIDE 1 MILLIGRAM(S): 2 INJECTION INTRAMUSCULAR; INTRAVENOUS; SUBCUTANEOUS at 06:55

## 2022-06-08 RX ADMIN — GABAPENTIN 600 MILLIGRAM(S): 400 CAPSULE ORAL at 18:03

## 2022-06-08 RX ADMIN — HYDROMORPHONE HYDROCHLORIDE 1 MILLIGRAM(S): 2 INJECTION INTRAMUSCULAR; INTRAVENOUS; SUBCUTANEOUS at 06:38

## 2022-06-08 RX ADMIN — Medication 5 MILLIGRAM(S): at 20:59

## 2022-06-08 RX ADMIN — HYDROMORPHONE HYDROCHLORIDE 1 MILLIGRAM(S): 2 INJECTION INTRAMUSCULAR; INTRAVENOUS; SUBCUTANEOUS at 02:25

## 2022-06-08 RX ADMIN — HYDROMORPHONE HYDROCHLORIDE 1 MILLIGRAM(S): 2 INJECTION INTRAMUSCULAR; INTRAVENOUS; SUBCUTANEOUS at 11:30

## 2022-06-08 RX ADMIN — PANTOPRAZOLE SODIUM 40 MILLIGRAM(S): 20 TABLET, DELAYED RELEASE ORAL at 06:11

## 2022-06-08 RX ADMIN — HYDROMORPHONE HYDROCHLORIDE 1 MILLIGRAM(S): 2 INJECTION INTRAMUSCULAR; INTRAVENOUS; SUBCUTANEOUS at 02:09

## 2022-06-08 RX ADMIN — GABAPENTIN 600 MILLIGRAM(S): 400 CAPSULE ORAL at 06:11

## 2022-06-08 RX ADMIN — HYDROMORPHONE HYDROCHLORIDE 1 MILLIGRAM(S): 2 INJECTION INTRAMUSCULAR; INTRAVENOUS; SUBCUTANEOUS at 18:06

## 2022-06-08 RX ADMIN — PANTOPRAZOLE SODIUM 40 MILLIGRAM(S): 20 TABLET, DELAYED RELEASE ORAL at 18:03

## 2022-06-08 NOTE — H&P ADULT - ASSESSMENT
27 y/o female with hx Marysol-Danlos, stage 4 endometriosis, fibromyalgia, chronic back pain / chronic opioid use, s/p extended abdominoplasty on 6/2/22 (per plastic surgery, Dr. Hiwot Pop) d/t chronic panniculitis and loose hanging skin causing lower back came here  saying that she is internally bleeding from her GI tract.     Patient  w/ severe abdominal pain and 1 episode of dark stool, found to be anemic (she was seen at [place] ED on 6/5, D/Ephraim after PRBC transfusion and imaging revealing non-drainable abdominal wall hematoma, w/ instructions to stop Lovenox for post-op PPx, which she self-administered (location: thighs) for 3 days, re presented from Dr. Pop's office to [facility]'s ED on 6/7). came to ED saying that she is internally bleeding out of her GI track, signed out AMA came directly to [facility].     .  # R/O GI bleed Patient had CT abdomen which didnt show any acute findings   Anemia jacintoley post op and from Lovenox she received post op. Will get GI eval     # Anemia Monitor CBC

## 2022-06-08 NOTE — DISCHARGE NOTE PROVIDER - NSDCCPCAREPLAN_GEN_ALL_CORE_FT
PRINCIPAL DISCHARGE DIAGNOSIS  Diagnosis: Anemia due to acute blood loss  Assessment and Plan of Treatment: stable. out patient follow up

## 2022-06-08 NOTE — PATIENT PROFILE ADULT - FUNCTIONAL ASSESSMENT - DAILY ACTIVITY 4.
Impression: Other secondary cataract, bilateral: H26.493. Condition: established, stable. Plan: No treatment currently recommended. The patient will monitor vision changes and contact us with any decrease in vision. 3 = A little assistance

## 2022-06-08 NOTE — PATIENT PROFILE ADULT - FALL HARM RISK - HARM RISK INTERVENTIONS

## 2022-06-08 NOTE — BH CONSULTATION LIAISON ASSESSMENT NOTE - SUMMARY
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

## 2022-06-08 NOTE — BH CONSULTATION LIAISON ASSESSMENT NOTE - RISK ASSESSMENT
Risk factors: marijuana use, chronic back pain    Protective factors: no current SIIP/HIIP, no h/o SA/SIB, no access to weapons, no psychosis, engaged in work or school, domiciled, social supports, help-seeking behaviors    Overall, pt is a low risk of harm to self/others and does not require psychiatric admission for safety and stabilization.

## 2022-06-08 NOTE — BH CONSULTATION LIAISON ASSESSMENT NOTE - NSBHCHARTREVIEWLAB_PSY_A_CORE FT
9.2    8.42  )-----------( 335      ( 08 Jun 2022 06:30 )             28.5   06-08    137  |  101  |  7   ----------------------------<  89  4.2   |  29  |  0.49<L>    Ca    9.2      08 Jun 2022 06:30    TPro  6.9  /  Alb  3.3  /  TBili  0.8  /  DBili  x   /  AST  16  /  ALT  16  /  AlkPhos  54  06-07

## 2022-06-08 NOTE — BH CONSULTATION LIAISON ASSESSMENT NOTE - NSBHCHARTREVIEWINVESTIGATE_PSY_A_CORE FT
< from: 12 Lead ECG (06.05.22 @ 14:37) >    Ventricular Rate 92 BPM    Atrial Rate 92 BPM    P-R Interval 172 ms    QRS Duration 92 ms    Q-T Interval 354 ms    QTC Calculation(Bazett) 437 ms    P Axis 28 degrees    R Axis -2 degrees    T Axis 5 degrees    Diagnosis Line Normal sinus rhythm  Minimal voltage criteria for LVH, may be normal variant ( R in aVL )  Incomplete right bundle branch block  Nonspecific T wave abnormality  Abnormal ECG  When compared with ECG of 05-JUN-2022 14:37, (Unconfirmed)  No significant change was found  Confirmed by Palla MD, Sina ([id number]) on 6/6/2022 6:24:40 PM    < end of copied text >

## 2022-06-08 NOTE — PROGRESS NOTE ADULT - SUBJECTIVE AND OBJECTIVE BOX
Patient was seen and examined at bedside by myself with nursing staff.  Boyfriend present in the room.  Patient called my office earlier to tell them that her "wound opened up completely and to let me know immediately".  I called to speak to the nurse caring for the patient, and after examination, it was found that the patient did not have any open wounds.    Patient when entering room was calm and pleasant with me. She also told me that the ER and staff on [address] told her that she was in a "life and death situation in the hospital, and that is why she is so anxious.  After speaking to the staff, this was never discussed or conveyed with the patient.        Exam:   Gen: AAOx3, NAD  Abdomen: soft, appropriately tender 2/2 recent surgery, no peritoneal signs, no hematoma, no asymmetry present.   R LAURITA with serosanguinous drainage, L LAURITA drain serous output in bulb. Steri strips in place. Umbilicus well approximated edges, no erythema, no cellulitis, no wound dehiscence to any incisions.   Extremity: B/L hip ecchymosis (improving in color), soft compartments. + distal pulses intact    A/P: 26F s/p extended abdominoplasty 2/2 panniculitis, loose hanging skin and frequent infections POD6    -- Patient from a plastic surgery standpoint is doing well, incisions well approximated and LAURITA drains with appropriate serosanguinous output, ecchymosis to B/L hips and flanks with improvement and soft compartments.  Patient recommended to continue monitoring LAURITA drain output, bacitracin to umbilicus and around LAURITA drain sites.  Cover with 4x4 gauze and tape QD.  Recommend to shower, although patient states that she feels like she has an unstable gait and may be a fall risk. Please continue with postop abx (Clindamycin).  She is unable to shower and also with drains in place. Keep abdominal binder in place at all times for compression.  Make sure to walk and ambulate   -- GI consult to evaluate patients reported melena   -- H/H stable at this time   -- Psychiatric evaluation as patient has been very anxious.  -- continue with pain control PRN  -- Please call me if any questions thank you [phone]

## 2022-06-08 NOTE — BH CONSULTATION LIAISON ASSESSMENT NOTE - HPI (INCLUDE ILLNESS QUALITY, SEVERITY, DURATION, TIMING, CONTEXT, MODIFYING FACTORS, ASSOCIATED SIGNS AND SYMPTOMS)
Patient seen, evaluated and chart reviewed. 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 Hiwot Pop) d/t chronic panniculitis and loose hanging skin causing lower back came here  saying that she is internally bleeding from her GI tract.   Patient  w/ severe abdominal pain and 1 episode of dark stool, found to be anemic (she was seen at [place] ED on 6/5, D/Ephraim after PRBC transfusion and imaging revealing non-drainable abdominal wall hematoma, w/ instructions to stop Lovenox for post-op PPx, which she self-administered (location: thighs) for 3 days, re presented from Dr. Pop's office to [facility]'s ED on 6/7). came to ED saying that she is internally bleeding out of her GI track, signed out AMA came directly to [facility]. Patient at this time reports worsening of her anxiety in context of difficulty dealing with complications post the procedure. She requests Valium to address her anxiety at this time. Patient has very limited insight into her possible substance abuse problem.

## 2022-06-08 NOTE — BH CONSULTATION LIAISON ASSESSMENT NOTE - NSBHCONSULTFOLLOWAFTERCARE_PSY_A_CORE FT
Please provide patient with phone number for adult outpatient clinic at [facility] for routine outpatient follow up: [phone]

## 2022-06-08 NOTE — ED ADULT NURSE REASSESSMENT NOTE - NS ED NURSE REASSESS COMMENT FT1
pt indecisive about staying. wants her s/o to stay with her. now wants to leave and come back tomorrow. staff speaking with patient at this time. pt now wants to leave AMA. risks of leaving explained to and understood by pt

## 2022-06-08 NOTE — H&P ADULT - HISTORY OF PRESENT ILLNESS
25 y/o female with hx Marysol-Danlos, stage 4 endometriosis, fibromyalgia, chronic back pain / chronic opioid use, s/p extended abdominoplasty on 6/2/22 (per plastic surgery, Dr. Hiwot Pop) d/t chronic panniculitis and loose hanging skin causing lower back came here  saying that she is internally bleeding from her GI tract.     Patient  w/ severe abdominal pain and 1 episode of dark stool, found to be anemic (she was seen at [place] ED on 6/5, D/Ephraim after PRBC transfusion and imaging revealing non-drainable abdominal wall hematoma, w/ instructions to stop Lovenox for post-op PPx, which she self-administered (location: thighs) for 3 days, re presented from Dr. Pop's office to [facility]'s ED on 6/7). came to ED saying that she is internally bleeding out of her GI track, signed out AMA came directly to [facility].     .

## 2022-06-08 NOTE — PATIENT PROFILE ADULT - TOBACCO USE
Discussion/Summary   no significant abnormality on blood tests        Verified Results  COMP METABOLIC PANEL WITH CBCA (CPNL,CBCA) [id number] 05:40PM GABBIE SANDOVAL     Test Name Result Flag Reference   FASTING STATUS NOT FASTING hrs     SODIUM 140 mmol/L  135-145   POTASSIUM 3.8 mmol/L  3.4-5.1   CHLORIDE 108 mmol/L H    CARBON DIOXIDE 23 mmol/L  21-32   ANION GAP 13 mmol/L  10-20   GLUCOSE 94 mg/dl  65-99   BUN 10 mg/dl  6-20   CREATININE 0.66 mg/dl  0.51-0.95   GFR EST.AFRICAN AMER >90     eGFR results = or >90 mL/min/1.73m2 = Normal kidney function.   GFR EST.NONAFRI AMER >90     eGFR results = or >90 mL/min/1.73m2 = Normal kidney function.   BUN/CREATININE RATIO 15  7-25   CALCIUM 9.0 mg/dl  8.4-10.2   BILIRUBIN TOTAL 0.3 mg/dl  0.2-1.0   GOT/AST 22 Units/L  <38   GPT/ALT 33 Units/L  <79   ALKALINE PHOSPHATASE 104 Units/L     TOTAL PROTEIN 7.0 g/dl  6.4-8.2   ALBUMIN 3.5 g/dl L 3.6-5.1   GLOBULIN (CALCULATED) 3.5 g/dl  2.0-4.0   A/G RATIO 1.0  1.0-2.4   WHITE BLOOD COUNT 6.4 K/mcL  4.2-11.0   RED CELL COUNT 4.60 mil/mcL  4.00-5.20   HEMOGLOBIN 12.2 g/dl  12.0-15.5   HEMATOCRIT 39.3 %  36.0-46.5   MEAN CORPUSCULAR VOLUME 85.4 fL  78.0-100.0   MEAN CORPUSCULAR HEMOGLOBIN 26.5 pg  26.0-34.0   MEAN CORPUSCULAR HGB CONC 31.0 g/dl L 32.0-36.5   RDW-CV 18.0 % H 11.0-15.0   PLATELET COUNT 303 K/mcL  140-450   DIFF TYPE      AUTOMATED DIFFERENTIAL   JACINTO% 50 %     LYM% 39 %     MON% 8 %     EOS% 3 %     BASO% 0 %     JACINTO ABS 3.3 K/mcL  1.8-7.7   LYM ABS 2.5 K/mcL  1.0-4.8   MON ABS 0.5 K/mcL  0.3-0.9   EOS ABS 0.2 K/mcL  0.1-0.5   BASO ABS 0.0 K/mcL  0.0-0.3     URINALYSIS SCREEN with MICROSCOPIC IF INDICATED AND URINE CULTURE REFLEX [id number] 05:40PM GABBIE SANDOVAL     Test Name Result Flag Reference   URINE COLOR YELLOW  YELLOW   APPEARANCE, URINE HAZY     URINE GLUCOSE NEGATIVE mg/dl  NEGATIVE   URINE BILIRUBIN NEGATIVE  NEGATIVE   KETONES NEGATIVE mg/dl  NEGATIVE   URINE SPECIFIC GRAVITY 1.025  1.005-1.030    RBC-URINE NEGATIVE  NEGATIVE   PH-URINE 5.0 Units  5.0-7.0   URINE PROTEIN NEGATIVE mg/dl  NEGATIVE   UROBILINOGEN-URINE 0.2 mg/dl  0.0-1.0   NITRITE NEGATIVE  NEGATIVE   WBC-URINE NEGATIVE  NEGATIVE   SPECIMEN TYPE      URINE, CLEAN CATCH/MIDSTREAM   SQUAMOUS EPITHELIAL CELLS 11 to 25 /HPF  0-5   ERYTHROCYTES 1 to 3 /HPF  0-3   LEUKOCYTES 1 to 5 /HPF  0-5   BACTERIA FEW /HPF A NONE SEEN   HYALINE CASTS NONE SEEN /LPF  0-5   MUCUS PRESENT          Never smoker

## 2022-06-08 NOTE — DISCHARGE NOTE PROVIDER - CARE PROVIDER_API CALL
Hiwot Pop (DO)  Plastic Surgery Surgery  [address] [place] [address]  Phone: [phone]  Fax: [phone]  Follow Up Time:

## 2022-06-08 NOTE — DISCHARGE NOTE PROVIDER - NSDCMRMEDTOKEN_GEN_ALL_CORE_FT
clindamycin 300 mg oral capsule: 1 cap(s) orally every 6 hours  cyclobenzaprine 10 mg oral tablet: 1 tab(s) orally 3 times a day, As needed, Muscle Spasm  Dilaudid 2 mg oral tablet: 1 tab(s) orally 3 times a day as needed for Breakthrough pain  ferrous sulfate:   gabapentin 600 mg oral tablet: 1 tab(s) orally 4 times a day    NOTE: LOCAL [facility] HAS SCRIPT FILLED FOR 3 TIMES DAY DOSING  Percocet 10/325 oral tablet: 1 tab(s) orally every 6 hours, As Needed    NOTE: PATIENT STATES JUST TAKING &quot;OXYCODONE&quot; BUT LOCAL PHARMACY HAS ONLY EVER FILLED PERCOCET

## 2022-06-08 NOTE — DISCHARGE NOTE NURSING/CASE MANAGEMENT/SOCIAL WORK - NSDCPEFALRISK_GEN_ALL_CORE
For information on Fall & Injury Prevention, visit: [email or web address] OR  [email or web address] OR  https://www.cdc.gov/steadi/patient.html

## 2022-06-08 NOTE — DISCHARGE NOTE NURSING/CASE MANAGEMENT/SOCIAL WORK - PATIENT PORTAL LINK FT
You can access the FollowMyHealth Patient Portal offered by [facility] by registering at the following website: [email or web address]. By joining [employer]’s FollowMyHealth portal, you will also be able to view your health information using other applications (apps) compatible with our system.

## 2022-06-08 NOTE — DISCHARGE NOTE PROVIDER - HOSPITAL COURSE
HPI:  25 y/o female with hx Marysol-Danlos, stage 4 endometriosis, fibromyalgia, chronic back pain / chronic opioid use, s/p extended abdominoplasty on 6/2/22 (per plastic surgery, Dr. Hiwot Pop) d/t chronic panniculitis and loose hanging skin causing lower back came here  saying that she is internally bleeding from her GI tract.     Patient  w/ severe abdominal pain and 1 episode of dark stool, found to be anemic (she was seen at [place] ED on 6/5, D/Ephraim after PRBC transfusion and imaging revealing non-drainable abdominal wall hematoma, w/ instructions to stop Lovenox for post-op PPx, which she self-administered (location: thighs) for 3 days, re presented from Dr. Pop's office to [facility]'s ED on 6/7). came to ED saying that she is internally bleeding out of her GI track, signed out AMA came directly to [facility].       Patient was seen by Psych and Plastics  CBC remained stable   Patient wants to go home if repeat CBC remains stable   CBC stable, follow up with GI out patient

## 2022-06-08 NOTE — BH CONSULTATION LIAISON ASSESSMENT NOTE - CURRENT MEDICATION
MEDICATIONS  (STANDING):  gabapentin 600 milliGRAM(s) Oral four times a day  influenza   Vaccine 0.5 milliLiter(s) IntraMuscular once  pantoprazole  Injectable 40 milliGRAM(s) IV Push two times a day    MEDICATIONS  (PRN):  cyclobenzaprine 10 milliGRAM(s) Oral three times a day PRN Muscle Spasm  HYDROmorphone  Injectable 1 milliGRAM(s) IV Push every 4 hours PRN Severe Pain (7 - 10)

## 2022-06-11 LAB — TOXASSURE SELECT RESULT: SIGNIFICANT CHANGE UP

## 2022-06-14 ENCOUNTER — INPATIENT (INPATIENT)
Facility: HOSPITAL | Age: 27
LOS: 1 days | Discharge: HOME | DRG: 920 | End: 2022-06-16
Attending: INTERNAL MEDICINE | Admitting: INTERNAL MEDICINE
Payer: COMMERCIAL

## 2022-06-14 VITALS
WEIGHT: 154.98 LBS | HEART RATE: 96 BPM | TEMPERATURE: 98 F | HEIGHT: 62 IN | DIASTOLIC BLOOD PRESSURE: 82 MMHG | SYSTOLIC BLOOD PRESSURE: 118 MMHG | OXYGEN SATURATION: 100 % | RESPIRATION RATE: 18 BRPM

## 2022-06-14 DIAGNOSIS — T81.49XA INFECTION FOLLOWING A PROCEDURE, OTHER SURGICAL SITE, INITIAL ENCOUNTER: ICD-10-CM

## 2022-06-14 DIAGNOSIS — Z98.89 OTHER SPECIFIED POSTPROCEDURAL STATES: Chronic | ICD-10-CM

## 2022-06-14 DIAGNOSIS — Z98.890 OTHER SPECIFIED POSTPROCEDURAL STATES: Chronic | ICD-10-CM

## 2022-06-14 LAB
ALBUMIN SERPL ELPH-MCNC: 3.3 G/DL — SIGNIFICANT CHANGE UP (ref 3.3–5)
ALP SERPL-CCNC: 52 U/L — SIGNIFICANT CHANGE UP (ref 30–120)
ALT FLD-CCNC: 11 U/L DA — SIGNIFICANT CHANGE UP (ref 10–60)
ANION GAP SERPL CALC-SCNC: 9 MMOL/L — SIGNIFICANT CHANGE UP (ref 5–17)
APPEARANCE UR: CLEAR — SIGNIFICANT CHANGE UP
APTT BLD: 41.5 SEC — HIGH (ref 27.5–35.5)
AST SERPL-CCNC: 17 U/L — SIGNIFICANT CHANGE UP (ref 10–40)
BASOPHILS # BLD AUTO: 0.05 K/UL — SIGNIFICANT CHANGE UP (ref 0–0.2)
BASOPHILS NFR BLD AUTO: 0.4 % — SIGNIFICANT CHANGE UP (ref 0–2)
BILIRUB SERPL-MCNC: 0.4 MG/DL — SIGNIFICANT CHANGE UP (ref 0.2–1.2)
BILIRUB UR-MCNC: NEGATIVE — SIGNIFICANT CHANGE UP
BUN SERPL-MCNC: 12 MG/DL — SIGNIFICANT CHANGE UP (ref 7–23)
CALCIUM SERPL-MCNC: 9.6 MG/DL — SIGNIFICANT CHANGE UP (ref 8.4–10.5)
CHLORIDE SERPL-SCNC: 100 MMOL/L — SIGNIFICANT CHANGE UP (ref 96–108)
CO2 SERPL-SCNC: 25 MMOL/L — SIGNIFICANT CHANGE UP (ref 22–31)
COLOR SPEC: YELLOW — SIGNIFICANT CHANGE UP
CREAT SERPL-MCNC: 0.6 MG/DL — SIGNIFICANT CHANGE UP (ref 0.5–1.3)
DIFF PNL FLD: ABNORMAL
EGFR: 127 ML/MIN/1.73M2 — SIGNIFICANT CHANGE UP
EOSINOPHIL # BLD AUTO: 0.15 K/UL — SIGNIFICANT CHANGE UP (ref 0–0.5)
EOSINOPHIL NFR BLD AUTO: 1.1 % — SIGNIFICANT CHANGE UP (ref 0–6)
GLUCOSE SERPL-MCNC: 146 MG/DL — HIGH (ref 70–99)
GLUCOSE UR QL: NEGATIVE MG/DL — SIGNIFICANT CHANGE UP
HCG SERPL-ACNC: <1 MIU/ML — SIGNIFICANT CHANGE UP
HCT VFR BLD CALC: 32.3 % — LOW (ref 34.5–45)
HGB BLD-MCNC: 10.5 G/DL — LOW (ref 11.5–15.5)
IMM GRANULOCYTES NFR BLD AUTO: 0.7 % — SIGNIFICANT CHANGE UP (ref 0–1.5)
INR BLD: 1.21 RATIO — HIGH (ref 0.88–1.16)
KETONES UR-MCNC: NEGATIVE — SIGNIFICANT CHANGE UP
LACTATE SERPL-SCNC: 1.2 MMOL/L — SIGNIFICANT CHANGE UP (ref 0.7–2)
LEUKOCYTE ESTERASE UR-ACNC: NEGATIVE — SIGNIFICANT CHANGE UP
LYMPHOCYTES # BLD AUTO: 1.44 K/UL — SIGNIFICANT CHANGE UP (ref 1–3.3)
LYMPHOCYTES # BLD AUTO: 10.6 % — LOW (ref 13–44)
MCHC RBC-ENTMCNC: 30.3 PG — SIGNIFICANT CHANGE UP (ref 27–34)
MCHC RBC-ENTMCNC: 32.5 GM/DL — SIGNIFICANT CHANGE UP (ref 32–36)
MCV RBC AUTO: 93.1 FL — SIGNIFICANT CHANGE UP (ref 80–100)
MONOCYTES # BLD AUTO: 0.54 K/UL — SIGNIFICANT CHANGE UP (ref 0–0.9)
MONOCYTES NFR BLD AUTO: 4 % — SIGNIFICANT CHANGE UP (ref 2–14)
NEUTROPHILS # BLD AUTO: 11.28 K/UL — HIGH (ref 1.8–7.4)
NEUTROPHILS NFR BLD AUTO: 83.2 % — HIGH (ref 43–77)
NITRITE UR-MCNC: NEGATIVE — SIGNIFICANT CHANGE UP
NRBC # BLD: 0 /100 WBCS — SIGNIFICANT CHANGE UP (ref 0–0)
PH UR: 6 — SIGNIFICANT CHANGE UP (ref 5–8)
PLATELET # BLD AUTO: 524 K/UL — HIGH (ref 150–400)
POTASSIUM SERPL-MCNC: 3.9 MMOL/L — SIGNIFICANT CHANGE UP (ref 3.5–5.3)
POTASSIUM SERPL-SCNC: 3.9 MMOL/L — SIGNIFICANT CHANGE UP (ref 3.5–5.3)
PROT SERPL-MCNC: 7.7 G/DL — SIGNIFICANT CHANGE UP (ref 6–8.3)
PROT UR-MCNC: NEGATIVE MG/DL — SIGNIFICANT CHANGE UP
PROTHROM AB SERPL-ACNC: 14.3 SEC — HIGH (ref 10.5–13.4)
RBC # BLD: 3.47 M/UL — LOW (ref 3.8–5.2)
RBC # FLD: 13.3 % — SIGNIFICANT CHANGE UP (ref 10.3–14.5)
SARS-COV-2 RNA SPEC QL NAA+PROBE: SIGNIFICANT CHANGE UP
SODIUM SERPL-SCNC: 134 MMOL/L — LOW (ref 135–145)
SP GR SPEC: 1.02 — SIGNIFICANT CHANGE UP (ref 1.01–1.02)
UROBILINOGEN FLD QL: NEGATIVE MG/DL — SIGNIFICANT CHANGE UP
WBC # BLD: 13.56 K/UL — HIGH (ref 3.8–10.5)
WBC # FLD AUTO: 13.56 K/UL — HIGH (ref 3.8–10.5)

## 2022-06-14 PROCEDURE — 99285 EMERGENCY DEPT VISIT HI MDM: CPT

## 2022-06-14 PROCEDURE — 71045 X-RAY EXAM CHEST 1 VIEW: CPT | Mod: 26

## 2022-06-14 RX ORDER — GABAPENTIN 400 MG/1
600 CAPSULE ORAL
Refills: 0 | Status: DISCONTINUED | OUTPATIENT
Start: 2022-06-14 | End: 2022-06-16

## 2022-06-14 RX ORDER — FERROUS SULFATE 325(65) MG
0 TABLET ORAL
Qty: 0 | Refills: 0 | DISCHARGE

## 2022-06-14 RX ORDER — SODIUM CHLORIDE 9 MG/ML
1000 INJECTION INTRAMUSCULAR; INTRAVENOUS; SUBCUTANEOUS ONCE
Refills: 0 | Status: COMPLETED | OUTPATIENT
Start: 2022-06-14 | End: 2022-06-14

## 2022-06-14 RX ORDER — GABAPENTIN 400 MG/1
600 CAPSULE ORAL ONCE
Refills: 0 | Status: COMPLETED | OUTPATIENT
Start: 2022-06-14 | End: 2022-06-14

## 2022-06-14 RX ORDER — OXYCODONE AND ACETAMINOPHEN 5; 325 MG/1; MG/1
1 TABLET ORAL
Refills: 0 | Status: DISCONTINUED | OUTPATIENT
Start: 2022-06-14 | End: 2022-06-16

## 2022-06-14 RX ORDER — HYDROMORPHONE HYDROCHLORIDE 2 MG/ML
1 INJECTION INTRAMUSCULAR; INTRAVENOUS; SUBCUTANEOUS
Qty: 0 | Refills: 0 | DISCHARGE

## 2022-06-14 RX ORDER — HYDROMORPHONE HYDROCHLORIDE 2 MG/ML
1 INJECTION INTRAMUSCULAR; INTRAVENOUS; SUBCUTANEOUS ONCE
Refills: 0 | Status: DISCONTINUED | OUTPATIENT
Start: 2022-06-14 | End: 2022-06-14

## 2022-06-14 RX ORDER — HYDROMORPHONE HYDROCHLORIDE 2 MG/ML
0.5 INJECTION INTRAMUSCULAR; INTRAVENOUS; SUBCUTANEOUS ONCE
Refills: 0 | Status: DISCONTINUED | OUTPATIENT
Start: 2022-06-14 | End: 2022-06-14

## 2022-06-14 RX ORDER — HYDROMORPHONE HYDROCHLORIDE 2 MG/ML
1 INJECTION INTRAMUSCULAR; INTRAVENOUS; SUBCUTANEOUS EVERY 4 HOURS
Refills: 0 | Status: DISCONTINUED | OUTPATIENT
Start: 2022-06-14 | End: 2022-06-15

## 2022-06-14 RX ORDER — VANCOMYCIN HCL 1 G
1000 VIAL (EA) INTRAVENOUS EVERY 12 HOURS
Refills: 0 | Status: DISCONTINUED | OUTPATIENT
Start: 2022-06-14 | End: 2022-06-16

## 2022-06-14 RX ORDER — CYCLOBENZAPRINE HYDROCHLORIDE 10 MG/1
10 TABLET, FILM COATED ORAL THREE TIMES A DAY
Refills: 0 | Status: DISCONTINUED | OUTPATIENT
Start: 2022-06-14 | End: 2022-06-16

## 2022-06-14 RX ADMIN — HYDROMORPHONE HYDROCHLORIDE 0.5 MILLIGRAM(S): 2 INJECTION INTRAMUSCULAR; INTRAVENOUS; SUBCUTANEOUS at 15:50

## 2022-06-14 RX ADMIN — HYDROMORPHONE HYDROCHLORIDE 1 MILLIGRAM(S): 2 INJECTION INTRAMUSCULAR; INTRAVENOUS; SUBCUTANEOUS at 21:45

## 2022-06-14 RX ADMIN — GABAPENTIN 600 MILLIGRAM(S): 400 CAPSULE ORAL at 16:41

## 2022-06-14 RX ADMIN — SODIUM CHLORIDE 1000 MILLILITER(S): 9 INJECTION INTRAMUSCULAR; INTRAVENOUS; SUBCUTANEOUS at 15:04

## 2022-06-14 RX ADMIN — HYDROMORPHONE HYDROCHLORIDE 0.5 MILLIGRAM(S): 2 INJECTION INTRAMUSCULAR; INTRAVENOUS; SUBCUTANEOUS at 16:05

## 2022-06-14 RX ADMIN — Medication 600 MILLIGRAM(S): at 16:55

## 2022-06-14 RX ADMIN — SODIUM CHLORIDE 1000 MILLILITER(S): 9 INJECTION INTRAMUSCULAR; INTRAVENOUS; SUBCUTANEOUS at 16:39

## 2022-06-14 RX ADMIN — Medication 100 MILLIGRAM(S): at 16:10

## 2022-06-14 NOTE — ED PROVIDER NOTE - CLINICAL SUMMARY MEDICAL DECISION MAKING FREE TEXT BOX
Pt with a PMHx of fibromyalgia, endometriosis's, Marysol-Danlos disease, GERD, presents to the ED s/p abdominal plasty on 6/2 with post op corps complicated by anemia with multiple transfusions and admission at [facility] and [place] c/o dehiscence of surgical wound and foul smelling drainage of right wound drainage.  Plan- labs, plastics consult Pt with a PMHx of fibromyalgia, endometriosis's, Marysol-Danlos disease, GERD, presents to the ED s/p abdominal plasty on 6/2 with post op coourse complicated by anemia with multiple transfusions and admission at [facility] and [place] c/o dehiscence of surgical wound and foul smelling drainage of right wound drain.  Plan- labs, plastics consult

## 2022-06-14 NOTE — ED PROVIDER NOTE - SKIN, MLM
+1.5cm dehiscence noted to mid lower surgical incision site, no active drainage noted, +old ecchymosis noted to R thigh

## 2022-06-14 NOTE — ED PROVIDER NOTE - PROGRESS NOTE DETAILS
Pt seen by plastic surgeon, Dr. Pop in ED, advised admission for IV abx due to leukocytosis and fevers at home. Spoke to Dr. Juan who accepted admission.

## 2022-06-14 NOTE — ED PROVIDER NOTE - OBJECTIVE STATEMENT
26 y/o F with PMHx of Marysol-Danlos, stage 4 endometriosis, fibromyalgia, chronic back pain / chronic opioid use (follows w/ Dr. Azra Caldera as O/P, who presents to ED s/p extended abdominoplasty on 6/2/22 (per plastic surgery, Dr. Hiwot Pop) d/t chronic panniculitis and loose hanging skin causing lower back pain presents with c/o wond, w/ severe abdominal painl, found to be anemic (she was seen at [place]'s ED on 6/5, D/Ephraim after PRBC transfusion and imaging revealing non-drainable abdominal wall hematoma, w/ instructions to stop Lovenox for post-op PPx, which she self-administered (location: thighs) for 3 days, and re-presents from Dr. Pop's office to [facility]'s ED on 6/7). She had   multiple CT scans that were preformed during   and [facility] emergency room visits to rule out bleeding , all CT  scans were  reported normal  .  Surgical wound drainage is  sero-sanguineous , no fever, no chills. Discussed case with Dr Pop who recommended admitting the patient at  for the GI work up. 28 y/o F with PMHx of Marysol-Danlos, stage 4 endometriosis, fibromyalgia, chronic back pain / chronic opioid use (follows w/ Dr. Azra Caldera as O/P, who presents to ED s/p extended abdominoplasty on 6/2/22 (per plastic surgery, Dr. Hiwot Pop) d/t chronic panniculitis and loose hanging skin causing lower back pain presents with c/o open wound to mid lower abdominal incisional site today. States that she has had exudative drainage from her right LAURITA drain as well. States that she has had fevers at home (tmax: 100.7F) and chills. Pt has had severe abdominal pain, found to be anemic (she was seen at [place]'s ED on 6/5, D/Ephraim after PRBC transfusion and imaging revealing non-drainable abdominal wall hematoma, w/ instructions to stop Lovenox for post-op PPx which she self-administered (location: thighs) for 3 days, and re-presents from Dr. Pop's office to [facility]'s ED on 6/7). She had multiple CT scans that were preformed during   and [facility] emergency room visits to rule out bleeding , all CT  scans were  reported normal. Denies CP, SOB, N/V/D.

## 2022-06-14 NOTE — ED PROVIDER NOTE - SKIN [+], MLM
Education Provided:  Lower Back Pain/HF--UTI    Patient on Complex CM Enrollment Report/fu Contact. Left message on voice mail with my contact information for return call. Need to assess for ongoing needs and CCM enrollment/fu.
wound dehiscence and drainage/BRUISING

## 2022-06-14 NOTE — ED PROVIDER NOTE - GASTROINTESTINAL, MLM
abdomen soft, non distended, B LAURITA drains in place, right LAURITA drain with bloody drainage, steri strips noted in place to lower abdominal surgical incision site with 1.5cm dehiscence noted to middle of surgical incision site

## 2022-06-15 LAB
ALBUMIN SERPL ELPH-MCNC: 3.2 G/DL — LOW (ref 3.3–5)
ALP SERPL-CCNC: 51 U/L — SIGNIFICANT CHANGE UP (ref 30–120)
ALT FLD-CCNC: 12 U/L DA — SIGNIFICANT CHANGE UP (ref 10–60)
ANION GAP SERPL CALC-SCNC: 6 MMOL/L — SIGNIFICANT CHANGE UP (ref 5–17)
AST SERPL-CCNC: 10 U/L — SIGNIFICANT CHANGE UP (ref 10–40)
BASOPHILS # BLD AUTO: 0.05 K/UL — SIGNIFICANT CHANGE UP (ref 0–0.2)
BASOPHILS NFR BLD AUTO: 0.4 % — SIGNIFICANT CHANGE UP (ref 0–2)
BILIRUB SERPL-MCNC: 0.4 MG/DL — SIGNIFICANT CHANGE UP (ref 0.2–1.2)
BUN SERPL-MCNC: 12 MG/DL — SIGNIFICANT CHANGE UP (ref 7–23)
CALCIUM SERPL-MCNC: 9.1 MG/DL — SIGNIFICANT CHANGE UP (ref 8.4–10.5)
CHLORIDE SERPL-SCNC: 102 MMOL/L — SIGNIFICANT CHANGE UP (ref 96–108)
CO2 SERPL-SCNC: 28 MMOL/L — SIGNIFICANT CHANGE UP (ref 22–31)
CREAT SERPL-MCNC: 0.58 MG/DL — SIGNIFICANT CHANGE UP (ref 0.5–1.3)
EGFR: 128 ML/MIN/1.73M2 — SIGNIFICANT CHANGE UP
EOSINOPHIL # BLD AUTO: 0.22 K/UL — SIGNIFICANT CHANGE UP (ref 0–0.5)
EOSINOPHIL NFR BLD AUTO: 1.9 % — SIGNIFICANT CHANGE UP (ref 0–6)
GLUCOSE SERPL-MCNC: 99 MG/DL — SIGNIFICANT CHANGE UP (ref 70–99)
HCT VFR BLD CALC: 29.7 % — LOW (ref 34.5–45)
HGB BLD-MCNC: 9.6 G/DL — LOW (ref 11.5–15.5)
IMM GRANULOCYTES NFR BLD AUTO: 0.5 % — SIGNIFICANT CHANGE UP (ref 0–1.5)
LYMPHOCYTES # BLD AUTO: 2.62 K/UL — SIGNIFICANT CHANGE UP (ref 1–3.3)
LYMPHOCYTES # BLD AUTO: 23.1 % — SIGNIFICANT CHANGE UP (ref 13–44)
MCHC RBC-ENTMCNC: 30.5 PG — SIGNIFICANT CHANGE UP (ref 27–34)
MCHC RBC-ENTMCNC: 32.3 GM/DL — SIGNIFICANT CHANGE UP (ref 32–36)
MCV RBC AUTO: 94.3 FL — SIGNIFICANT CHANGE UP (ref 80–100)
MONOCYTES # BLD AUTO: 0.58 K/UL — SIGNIFICANT CHANGE UP (ref 0–0.9)
MONOCYTES NFR BLD AUTO: 5.1 % — SIGNIFICANT CHANGE UP (ref 2–14)
NEUTROPHILS # BLD AUTO: 7.81 K/UL — HIGH (ref 1.8–7.4)
NEUTROPHILS NFR BLD AUTO: 69 % — SIGNIFICANT CHANGE UP (ref 43–77)
NRBC # BLD: 0 /100 WBCS — SIGNIFICANT CHANGE UP (ref 0–0)
PLATELET # BLD AUTO: 482 K/UL — HIGH (ref 150–400)
POTASSIUM SERPL-MCNC: 3.7 MMOL/L — SIGNIFICANT CHANGE UP (ref 3.5–5.3)
POTASSIUM SERPL-SCNC: 3.7 MMOL/L — SIGNIFICANT CHANGE UP (ref 3.5–5.3)
PROT SERPL-MCNC: 7.2 G/DL — SIGNIFICANT CHANGE UP (ref 6–8.3)
RBC # BLD: 3.15 M/UL — LOW (ref 3.8–5.2)
RBC # FLD: 13.5 % — SIGNIFICANT CHANGE UP (ref 10.3–14.5)
SODIUM SERPL-SCNC: 136 MMOL/L — SIGNIFICANT CHANGE UP (ref 135–145)
WBC # BLD: 11.34 K/UL — HIGH (ref 3.8–10.5)
WBC # FLD AUTO: 11.34 K/UL — HIGH (ref 3.8–10.5)

## 2022-06-15 PROCEDURE — 99221 1ST HOSP IP/OBS SF/LOW 40: CPT

## 2022-06-15 RX ORDER — HYDROMORPHONE HYDROCHLORIDE 2 MG/ML
0.5 INJECTION INTRAMUSCULAR; INTRAVENOUS; SUBCUTANEOUS ONCE
Refills: 0 | Status: DISCONTINUED | OUTPATIENT
Start: 2022-06-15 | End: 2022-06-15

## 2022-06-15 RX ORDER — BACITRACIN ZINC 500 UNIT/G
1 OINTMENT IN PACKET (EA) TOPICAL ONCE
Refills: 0 | Status: COMPLETED | OUTPATIENT
Start: 2022-06-15 | End: 2022-06-15

## 2022-06-15 RX ORDER — HYDROMORPHONE HYDROCHLORIDE 2 MG/ML
1 INJECTION INTRAMUSCULAR; INTRAVENOUS; SUBCUTANEOUS EVERY 4 HOURS
Refills: 0 | Status: DISCONTINUED | OUTPATIENT
Start: 2022-06-15 | End: 2022-06-16

## 2022-06-15 RX ORDER — QUETIAPINE FUMARATE 200 MG/1
25 TABLET, FILM COATED ORAL EVERY 6 HOURS
Refills: 0 | Status: DISCONTINUED | OUTPATIENT
Start: 2022-06-15 | End: 2022-06-16

## 2022-06-15 RX ORDER — CEFTRIAXONE 500 MG/1
2000 INJECTION, POWDER, FOR SOLUTION INTRAMUSCULAR; INTRAVENOUS EVERY 24 HOURS
Refills: 0 | Status: DISCONTINUED | OUTPATIENT
Start: 2022-06-15 | End: 2022-06-16

## 2022-06-15 RX ORDER — HYDROMORPHONE HYDROCHLORIDE 2 MG/ML
1 INJECTION INTRAMUSCULAR; INTRAVENOUS; SUBCUTANEOUS EVERY 6 HOURS
Refills: 0 | Status: DISCONTINUED | OUTPATIENT
Start: 2022-06-15 | End: 2022-06-15

## 2022-06-15 RX ORDER — DIAZEPAM 5 MG
2 TABLET ORAL
Refills: 0 | Status: DISCONTINUED | OUTPATIENT
Start: 2022-06-15 | End: 2022-06-16

## 2022-06-15 RX ORDER — INFLUENZA VIRUS VACCINE 15; 15; 15; 15 UG/.5ML; UG/.5ML; UG/.5ML; UG/.5ML
0.5 SUSPENSION INTRAMUSCULAR ONCE
Refills: 0 | Status: DISCONTINUED | OUTPATIENT
Start: 2022-06-15 | End: 2022-06-16

## 2022-06-15 RX ADMIN — HYDROMORPHONE HYDROCHLORIDE 1 MILLIGRAM(S): 2 INJECTION INTRAMUSCULAR; INTRAVENOUS; SUBCUTANEOUS at 13:24

## 2022-06-15 RX ADMIN — HYDROMORPHONE HYDROCHLORIDE 0.5 MILLIGRAM(S): 2 INJECTION INTRAMUSCULAR; INTRAVENOUS; SUBCUTANEOUS at 02:25

## 2022-06-15 RX ADMIN — OXYCODONE AND ACETAMINOPHEN 1 TABLET(S): 5; 325 TABLET ORAL at 07:00

## 2022-06-15 RX ADMIN — GABAPENTIN 600 MILLIGRAM(S): 400 CAPSULE ORAL at 05:45

## 2022-06-15 RX ADMIN — HYDROMORPHONE HYDROCHLORIDE 1 MILLIGRAM(S): 2 INJECTION INTRAMUSCULAR; INTRAVENOUS; SUBCUTANEOUS at 07:36

## 2022-06-15 RX ADMIN — HYDROMORPHONE HYDROCHLORIDE 1 MILLIGRAM(S): 2 INJECTION INTRAMUSCULAR; INTRAVENOUS; SUBCUTANEOUS at 08:00

## 2022-06-15 RX ADMIN — HYDROMORPHONE HYDROCHLORIDE 1 MILLIGRAM(S): 2 INJECTION INTRAMUSCULAR; INTRAVENOUS; SUBCUTANEOUS at 21:45

## 2022-06-15 RX ADMIN — Medication 250 MILLIGRAM(S): at 17:15

## 2022-06-15 RX ADMIN — Medication 250 MILLIGRAM(S): at 05:45

## 2022-06-15 RX ADMIN — Medication 1 APPLICATION(S): at 09:32

## 2022-06-15 RX ADMIN — HYDROMORPHONE HYDROCHLORIDE 1 MILLIGRAM(S): 2 INJECTION INTRAMUSCULAR; INTRAVENOUS; SUBCUTANEOUS at 22:15

## 2022-06-15 RX ADMIN — GABAPENTIN 600 MILLIGRAM(S): 400 CAPSULE ORAL at 17:15

## 2022-06-15 RX ADMIN — HYDROMORPHONE HYDROCHLORIDE 1 MILLIGRAM(S): 2 INJECTION INTRAMUSCULAR; INTRAVENOUS; SUBCUTANEOUS at 01:13

## 2022-06-15 RX ADMIN — HYDROMORPHONE HYDROCHLORIDE 1 MILLIGRAM(S): 2 INJECTION INTRAMUSCULAR; INTRAVENOUS; SUBCUTANEOUS at 01:04

## 2022-06-15 RX ADMIN — OXYCODONE AND ACETAMINOPHEN 1 TABLET(S): 5; 325 TABLET ORAL at 06:23

## 2022-06-15 RX ADMIN — CEFTRIAXONE 100 MILLIGRAM(S): 500 INJECTION, POWDER, FOR SOLUTION INTRAMUSCULAR; INTRAVENOUS at 16:27

## 2022-06-15 RX ADMIN — HYDROMORPHONE HYDROCHLORIDE 1 MILLIGRAM(S): 2 INJECTION INTRAMUSCULAR; INTRAVENOUS; SUBCUTANEOUS at 17:36

## 2022-06-15 RX ADMIN — HYDROMORPHONE HYDROCHLORIDE 1 MILLIGRAM(S): 2 INJECTION INTRAMUSCULAR; INTRAVENOUS; SUBCUTANEOUS at 01:19

## 2022-06-15 RX ADMIN — GABAPENTIN 600 MILLIGRAM(S): 400 CAPSULE ORAL at 12:32

## 2022-06-15 RX ADMIN — HYDROMORPHONE HYDROCHLORIDE 0.5 MILLIGRAM(S): 2 INJECTION INTRAMUSCULAR; INTRAVENOUS; SUBCUTANEOUS at 02:10

## 2022-06-15 RX ADMIN — HYDROMORPHONE HYDROCHLORIDE 1 MILLIGRAM(S): 2 INJECTION INTRAMUSCULAR; INTRAVENOUS; SUBCUTANEOUS at 18:00

## 2022-06-15 NOTE — BH CONSULTATION LIAISON ASSESSMENT NOTE - NSBHCONSULTRECOMMENDOTHER_PSY_A_CORE FT
At this time patient declining beginning an SSRI or other medication to take daily, but states she is interested in a referral for an outpatient psychiatrist.

## 2022-06-15 NOTE — H&P ADULT - ASSESSMENT
Abdominal Cellulitis s/p Surgery   Start pateint on iv abx   Surgery consult  Follow up wound cultures    ID consult    Abdominal Cellulitis s/p Surgery   Start pateint on iv abx   Surgery consult  Follow up wound cultures    ID consult     Anxiety Depression Psych consult in AM    Abdominal Cellulitis s/p Surgery   Start pateint on iv abx   Surgery consult  Follow up wound cultures    ID consult     Chronic Pain Pain management consult Left message with Pain management service   Dilaudid 1 mg iv q 4 prn severe pain   Percocet Moderate pain     Anxiety Depression Psych consult called   Follow up recs

## 2022-06-15 NOTE — H&P ADULT - HISTORY OF PRESENT ILLNESS
S27 y/o F with PMHx of Marysol-Danlos, stage 4 endometriosis, fibromyalgia, chronic back pain / chronic opioid use (follows w/ Dr. Azra Caldera as O/P, who presents to ED s/p extended abdominoplasty on 6/2/22 (per plastic surgery, Dr. Hiwot Pop) d/t chronic panniculitis and loose hanging skin causing lower back pain presents with c/o open wound to mid lower abdominal incisional site today. States that she has had exudative drainage from her right LAURITA drain as well. States that she has had fevers at home (tmax: 100.7F) and chills. Pt has had severe abdominal pain, found to be anemic (she was seen at [place]'s ED on 6/5, D/Ephraim after PRBC transfusion and imaging revealing non-drainable abdominal wall hematoma, w/ instructions to stop Lovenox for post-op PPx which she self-administered (location: thighs) for 3 days, and re-presents from Dr. Pop's office to [facility]'s ED on 6/7). She had multiple CT scans that were preformed didnt reveal any source of bleeding.     Recent admiession tor bleed no evidance of bleed on CT abdomen CBC ramained stable and was discharged.

## 2022-06-15 NOTE — BH CONSULTATION LIAISON ASSESSMENT NOTE - NSBHCHARTREVIEWVS_PSY_A_CORE FT
Vital Signs Last 24 Hrs  T(C): 37.1 (15 Crow 2022 14:00), Max: 37.4 (15 Crow 2022 00:12)  T(F): 98.8 (15 Crow 2022 14:00), Max: 99.3 (15 Crow 2022 00:12)  HR: 96 (15 Crow 2022 14:00) (86 - 108)  BP: 118/71 (15 Crow 2022 14:00) (91/62 - 137/79)  BP(mean): --  RR: 18 (15 Crow 2022 14:00) (15 - 19)  SpO2: 98% (15 Crow 2022 14:00) (97% - 99%)

## 2022-06-15 NOTE — BH CONSULTATION LIAISON ASSESSMENT NOTE - NSBHCONSULTFOLLOWAFTERCARE_PSY_A_CORE FT
Please provide patient with phone number for adult outpatient clinic at [facility] for routine outpatient follow up: [phone]

## 2022-06-15 NOTE — BH CONSULTATION LIAISON ASSESSMENT NOTE - HPI (INCLUDE ILLNESS QUALITY, SEVERITY, DURATION, TIMING, CONTEXT, MODIFYING FACTORS, ASSOCIATED SIGNS AND SYMPTOMS)
Patient seen, evaluated and chart reviewed. 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 Hiwot Pop) d/t chronic panniculitis and loose hanging skin causing lower back came here  saying that her wound has gotten infected. She was recently seen by the undersigned under similar conditions.  Patient at this time reports worsening of her anxiety in context of difficulty dealing with complications post the procedure. She requests Valium to address her anxiety at this time. Patient has very limited insight into her possible substance abuse problem.

## 2022-06-15 NOTE — CONSULT NOTE ADULT - SUBJECTIVE AND OBJECTIVE BOX
HPI:  26YO F with PMHx of Marysol-Danlos, stage 4 endometriosis, fibromyalgia, chronic back pain / chronic opioid use (follows w/ Dr. Azra Caldera as O/P, who presents to ED s/p extended abdominoplasty on 6/2/22 (per plastic surgery, Dr. Hiwot Pop) d/t chronic panniculitis and loose hanging skin causing lower back pain admitted with wound dehiscence/small open wound to mid lower abdominal incisional site. Had been to [facility] and [facility] over pats week and noted to be anemic sp transfusions. Had 3 CT AP- + for hematomas, right greater than left. No drainable collection. Had fever at home per pt and severe pain.     Infectious Disease consult was called to evaluate pt and for antibiotic management.      Past Medical & Surgical Hx:  PAST MEDICAL & SURGICAL HISTORY:  GERD (gastroesophageal reflux disease)  Marysol-Danlos disease  Endometriosis  Fibromyalgia  History of tonsillectomy  History of abdominoplasty      Social History--  EtOH: denies   Tobacco: denies   Drug Use: denies     FAMILY HISTORY:  Noncontributory    Allergies  penicillins (Rash)  sulfa drugs (Rash)    Intolerances  NONE      Home Medications:  clindamycin 300 mg oral capsule: 1 cap(s) orally every 6 hours (14 Jun 2022 17:36)  cyclobenzaprine 10 mg oral tablet: 1 tab(s) orally 3 times a day, As needed, Muscle Spasm (14 Jun 2022 17:36)  Dilaudid 2 mg oral tablet: 1 tab(s) orally 3 times a day as needed for Breakthrough pain (14 Jun 2022 17:36)  ferrous sulfate:  (14 Jun 2022 17:36)  gabapentin 600 mg oral tablet: 1 tab(s) orally 4 times a day    NOTE: [facility] HAS SCRIPT FILLED FOR 3 TIMES DAY DOSING (14 Jun 2022 17:36)  Percocet 10/325 oral tablet: 1 tab(s) orally every 6 hours, As Needed    NOTE: PATIENT STATES JUST TAKING &quot;OXYCODONE&quot; BUT LOCAL PHARM[facility]Y HAS ONLY EVER FILLED PERCOCET (14 Jun 2022 17:36)      Current Inpatient Medications :    ANTIBIOTICS:   vancomycin  IVPB 1000 milliGRAM(s) IV Intermittent every 12 hours      OTHER RELEVANT MEDICATIONS :  BACItracin   Ointment 1 Application(s) Topical once  cyclobenzaprine 10 milliGRAM(s) Oral three times a day PRN  gabapentin 600 milliGRAM(s) Oral four times a day  HYDROmorphone  Injectable 1 milliGRAM(s) IV Push every 6 hours PRN  influenza   Vaccine 0.5 milliLiter(s) IntraMuscular once  oxycodone    5 mG/acetaminophen 325 mG 1 Tablet(s) Oral every 3 hours PRN      ROS:  CONSTITUTIONAL:  Negative fever or chills  EYES:  Negative  blurry vision or double vision  CARDIOVASCULAR:  Negative for chest pain or palpitations  RESPIRATORY:  Negative for cough, wheezing, or SOB   GASTROINTESTINAL:  Negative for nausea, vomiting, diarrhea, constipation, + abdominal pain  GENITOURINARY:  Negative frequency, urgency , dysuria or hematuria   NEUROLOGIC:  No headache, confusion, dizziness, lightheadedness  All other systems were reviewed and are negative      I&O's Detail    14 Jun 2022 07:01  -  15 Crow 2022 07:00  --------------------------------------------------------  IN:  Total IN: 0 mL    OUT:    Drain (mL): 25 mL    Drain (mL): 50 mL  Total OUT: 75 mL    Total NET: -75 mL      Physical Exam:  Vital Signs Last 24 Hrs  T(C): 36.7 (15 Crow 2022 09:31), Max: 37.4 (15 Crow 2022 00:12)  T(F): 98 (15 Crow 2022 09:31), Max: 99.3 (15 Corw 2022 00:12)  HR: 91 (15 Crow 2022 09:31) (86 - 108)  BP: 115/82 (15 Crow 2022 09:31) (91/62 - 137/79)  RR: 19 (15 Crow 2022 09:31) (15 - 19)  SpO2: 99% (15 Crow 2022 09:31) (97% - 100%)  Height (cm): 157.5 (06-15 @ 00:04)  Weight (kg): 70.3 (06-15 @ 00:04)  BMI (kg/m2): 28.3 (06-15 @ 00:04)  BSA (m2): 1.72 (06-15 @ 00:04)    General: in pain  Neck: supple, trachea midline  Lungs: clear, no wheeze/rhonchi  Cardiovascular: regular rate and rhythm, S1 S2  Abdomen: soft, nontender, ND, bowel sounds normal  Lower abd surgical site c/d/i mid surgical site with small area of wound dehiscence mild serous drainage  LAURITA x1 left with serosan right with old blood  Neurological:  alert and oriented x3  Skin: no rash  Extremities: no cyanosis/clubbing/edema    Labs:       RECENT CULTURES:          RADIOLOGY & ADDITIONAL STUDIES:    ACC: [id number] EXAM:  CT ABDOMEN AND PELVIS                          PROCEDURE DATE:  06/07/2022          INTERPRETATION:  CLINICAL INFORMATION: 26 years  Female with abdominal   pain increased girth  s/p abdominoplasty    r/o bleeding.    COMPARISON: Contrast-enhanced CT 6/6/2022 and 6/5/2022    CONTRAST/COMPLICATIONS:  IV Contrast: NONE  Oral Contrast: NONE  Complications: None reported at time of study completion    PROCEDURE:  CT of the Abdomen and Pelvis was performed.  Sagittal and coronalreformats were performed.    LIMITATIONS: Evaluation of the solid organs, vascular structures and GI   tract is limited without oral and IV contrast.    FINDINGS:  LOWER CHEST: Within normal limits.    LIVER: Within normal limits.  BILE DUCTS: Normalcaliber.  GALLBLADDER: Vicarious excretion of contrast.  SPLEEN: Within normal limits.  PANCREAS: Within normal limits.  ADRENALS: Within normal limits.  KIDNEYS/URETERS: Within normal limits.    BLADDER: Within normal limits.  REPRODUCTIVE ORGANS: Obscured by artifact.    BOWEL: No bowel obstruction. Appendix is not visualized. No evidence of   inflammation in the pericecal region.. Mild colonic fecal burden.  PERITONEUM: No ascites.  VESSELS: Within normal limits.  RETROPERITONEUM/LYMPH NODES: No lymphadenopathy.  ABDOMINAL WALL: Postoperative changes. 2 surgical drains reidentified in   the lower abdominal wall. Diffuse subcutaneous fat stranding. Scattered   foci of subcutaneous air reidentified. Small collections of layering high   density fluid measuring up to 3 cm in the anterior abdomen unchanged.  BONES: L5-S1 posterior fusion and disc spacer.    IMPRESSION:  No evidence of intra-abdominal hemorrhage.    Reidentified postoperative changes in the abdominal wall including small   layering fluid collections, subcutaneous edema and subcutaneous air.      ACC: [id number] EXAM:  CT ABDOMEN AND PELVIS IC                          PROCEDURE DATE:  06/06/2022          INTERPRETATION:  CLINICAL INFORMATION: Abdominal pain status post   abdominoplasty. Dropping hemoglobin. GIB postoperative.    COMPARISON: CT chest, abdomen and pelvis 6/5/2022    CONTRAST/COMPLICATIONS:  IV Contrast: Omnipaque 350  60 cc administered   0 cc discarded  Oral Contrast: NONE  Complications: None reported at time of study completion    PROCEDURE:  CT of the Abdomen and Pelviswas performed.  Sagittal and coronal reformats were performed.    FINDINGS:  LOWER CHEST: Mild bibasilar linear atelectasis.    LIVER: Within normal limits.  BILE DUCTS: Normal caliber.  GALLBLADDER: New layering high density within the gallbladder likely due   to vicarious excretion of IV contrast from prior exam.  SPLEEN: Within normal limits.  PANCREAS: Within normal limits.  ADRENALS: Within normal limits.  KIDNEYS/URETERS: The kidneys enhance symmetrically. No hydronephrosis.    BLADDER: Partially obscured by streak artifact.  REPRODUCTIVE ORGANS: Partially obscured by streak artifact.    BOWEL: No bowel obstruction. No bowel wall thickening or inflammatory   changes. Appendix is normal. Limited assessment for active   gastrointestinal bleeding on this single portal venous phase study.  PERITONEUM: No ascites.  VESSELS: Within normal limits.  RETROPERITONEUM/LYMPH NODES: No lymphadenopathy. No evidence of   retroperitoneal hemorrhage.  ABDOMINAL WALL: Redemonstrated 2 surgical drainsin the anterior   abdominal wall with associated abdominal wall postsurgical changes   including diffuse subcutaneous fat stranding, scattered foci of   subcutaneous air and layering high density fluid in the right greater   than left lower abdominalwall, grossly unchanged from prior study. No   drainable collection.  BONES: Redemonstrated L5-S1 posterior lumbar fusion and disc spacer. No   acute osseous abnormality.    IMPRESSION:  No evidence of retroperitoneal hemorrhage. Limited assessment for   gastrointestinal bleeding.  Grossly unchanged anterior abdominal wall postsurgical changes with   subcutaneous edema, air and small layering hematomas, right greater than   left. No drainable collection.      Assessment :   26YO F with PMHx of Marysol-Danlos, stage 4 endometriosis, fibromyalgia, chronic back pain / chronic opioid use (follows w/ Dr. Azra Caldera as O/P, who presents to ED s/p extended abdominoplasty on 6/2/22 (per plastic surgery, Dr. Hiwot Pop) d/t chronic panniculitis and loose hanging skin causing lower back pain admitted with wound dehiscence/small open wound to mid lower abdominal incisional site. Had been to [facility] and [facility] over past week and noted to be anemic sp transfusions. Had 3 CT AP- + for hematomas, right greater than left. No drainable collection. Had fever at home per pt and severe pain. There is a concern for infection.      Plan :   Cont Vanc   Add Zosyn  Fu wound culture  Trend temps and cbc  Pt needs pain management     D/w Dr Juan      Continue with present regiment .  Approptiate use of antibiotics and adverse effects reviewed.      I have discussed the above plan of care with patient/boyfriendl. They expressed understanding of the treatment plan . Risks, benefits and alternatives discussed in detail. I have asked if they have any questions or concerns and appropriately addressed them to the best of my ability .      > 45 minutes spent in direct patient care reviewing  the notes, lab data/ imaging , discussion with multidisciplinary team. All questions were addressed and answered to the best of my capacity .    Thank you for allowing me to participate in the care of your patient .      Tong Marin MD  Infectious Disease  [phone]   HPI:  28YO F with PMHx of Marysol-Danlos, stage 4 endometriosis, fibromyalgia, chronic back pain / chronic opioid use (follows w/ Dr. Azra Caldera as O/P, who presents to ED s/p extended abdominoplasty on 6/2/22 (per plastic surgery, Dr. Hiwot Pop) d/t chronic panniculitis and loose hanging skin causing lower back pain admitted with wound dehiscence/small open wound to mid lower abdominal incisional site. Had been to [facility] and [facility] over pats week and noted to be anemic sp transfusions. Had 3 CT AP- + for hematomas, right greater than left. No drainable collection. Had fever at home per pt and severe pain.     Infectious Disease consult was called to evaluate pt and for antibiotic management.      Past Medical & Surgical Hx:  PAST MEDICAL & SURGICAL HISTORY:  GERD (gastroesophageal reflux disease)  Marysol-Danlos disease  Endometriosis  Fibromyalgia  History of tonsillectomy  History of abdominoplasty      Social History--  EtOH: denies   Tobacco: denies   Drug Use: denies     FAMILY HISTORY:  Noncontributory    Allergies  penicillins (Rash)  sulfa drugs (Rash)    Intolerances  NONE      Home Medications:  clindamycin 300 mg oral capsule: 1 cap(s) orally every 6 hours (14 Jun 2022 17:36)  cyclobenzaprine 10 mg oral tablet: 1 tab(s) orally 3 times a day, As needed, Muscle Spasm (14 Jun 2022 17:36)  Dilaudid 2 mg oral tablet: 1 tab(s) orally 3 times a day as needed for Breakthrough pain (14 Jun 2022 17:36)  ferrous sulfate:  (14 Jun 2022 17:36)  gabapentin 600 mg oral tablet: 1 tab(s) orally 4 times a day    NOTE: [facility] HAS SCRIPT FILLED FOR 3 TIMES DAY DOSING (14 Jun 2022 17:36)  Percocet 10/325 oral tablet: 1 tab(s) orally every 6 hours, As Needed    NOTE: PATIENT STATES JUST TAKING &quot;OXYCODONE&quot; BUT LOCAL PHARMACY HAS ONLY EVER FILLED PERCOCET (14 Jun 2022 17:36)      Current Inpatient Medications :    ANTIBIOTICS:   vancomycin  IVPB 1000 milliGRAM(s) IV Intermittent every 12 hours      OTHER RELEVANT MEDICATIONS :  BACItracin   Ointment 1 Application(s) Topical once  cyclobenzaprine 10 milliGRAM(s) Oral three times a day PRN  gabapentin 600 milliGRAM(s) Oral four times a day  HYDROmorphone  Injectable 1 milliGRAM(s) IV Push every 6 hours PRN  influenza   Vaccine 0.5 milliLiter(s) IntraMuscular once  oxycodone    5 mG/acetaminophen 325 mG 1 Tablet(s) Oral every 3 hours PRN      ROS:  CONSTITUTIONAL:  Negative fever or chills  EYES:  Negative  blurry vision or double vision  CARDIOVASCULAR:  Negative for chest pain or palpitations  RESPIRATORY:  Negative for cough, wheezing, or SOB   GASTROINTESTINAL:  Negative for nausea, vomiting, diarrhea, constipation, + abdominal pain  GENITOURINARY:  Negative frequency, urgency , dysuria or hematuria   NEUROLOGIC:  No headache, confusion, dizziness, lightheadedness  All other systems were reviewed and are negative      I&O's Detail    14 Jun 2022 07:01  -  15 Crow 2022 07:00  --------------------------------------------------------  IN:  Total IN: 0 mL    OUT:    Drain (mL): 25 mL    Drain (mL): 50 mL  Total OUT: 75 mL    Total NET: -75 mL      Physical Exam:  Vital Signs Last 24 Hrs  T(C): 36.7 (15 Crow 2022 09:31), Max: 37.4 (15 Crow 2022 00:12)  T(F): 98 (15 Crow 2022 09:31), Max: 99.3 (15 Crow 2022 00:12)  HR: 91 (15 Crow 2022 09:31) (86 - 108)  BP: 115/82 (15 Crow 2022 09:31) (91/62 - 137/79)  RR: 19 (15 Crow 2022 09:31) (15 - 19)  SpO2: 99% (15 Crow 2022 09:31) (97% - 100%)  Height (cm): 157.5 (06-15 @ 00:04)  Weight (kg): 70.3 (06-15 @ 00:04)  BMI (kg/m2): 28.3 (06-15 @ 00:04)  BSA (m2): 1.72 (06-15 @ 00:04)    General: in pain  Neck: supple, trachea midline  Lungs: clear, no wheeze/rhonchi  Cardiovascular: regular rate and rhythm, S1 S2  Abdomen: soft, nontender, ND, bowel sounds normal  Lower abd surgical site c/d/i mid surgical site with small area of wound dehiscence mild serous drainage  LAURITA x1 left with serosan right with old blood  Neurological:  alert and oriented x3  Skin: no rash  Extremities: no cyanosis/clubbing/edema    Labs:       RECENT CULTURES:          RADIOLOGY & ADDITIONAL STUDIES:    ACC: [id number] EXAM:  CT ABDOMEN AND PELVIS                          PROCEDURE DATE:  06/07/2022          INTERPRETATION:  CLINICAL INFORMATION: 26 years  Female with abdominal   pain increased girth  s/p abdominoplasty    r/o bleeding.    COMPARISON: Contrast-enhanced CT 6/6/2022 and 6/5/2022    CONTRAST/COMPLICATIONS:  IV Contrast: NONE  Oral Contrast: NONE  Complications: None reported at time of study completion    PROCEDURE:  CT of the Abdomen and Pelvis was performed.  Sagittal and coronalreformats were performed.    LIMITATIONS: Evaluation of the solid organs, vascular structures and GI   tract is limited without oral and IV contrast.    FINDINGS:  LOWER CHEST: Within normal limits.    LIVER: Within normal limits.  BILE DUCTS: Normalcaliber.  GALLBLADDER: Vicarious excretion of contrast.  SPLEEN: Within normal limits.  PANCREAS: Within normal limits.  ADRENALS: Within normal limits.  KIDNEYS/URETERS: Within normal limits.    BLADDER: Within normal limits.  REPRODUCTIVE ORGANS: Obscured by artifact.    BOWEL: No bowel obstruction. Appendix is not visualized. No evidence of   inflammation in the pericecal region.. Mild colonic fecal burden.  PERITONEUM: No ascites.  VESSELS: Within normal limits.  RETROPERITONEUM/LYMPH NODES: No lymphadenopathy.  ABDOMINAL WALL: Postoperative changes. 2 surgical drains reidentified in   the lower abdominal wall. Diffuse subcutaneous fat stranding. Scattered   foci of subcutaneous air reidentified. Small collections of layering high   density fluid measuring up to 3 cm in the anterior abdomen unchanged.  BONES: L5-S1 posterior fusion and disc spacer.    IMPRESSION:  No evidence of intra-abdominal hemorrhage.    Reidentified postoperative changes in the abdominal wall including small   layering fluid collections, subcutaneous edema and subcutaneous air.      ACC: [id number] EXAM:  CT ABDOMEN AND PELVIS IC                          PROCEDURE DATE:  06/06/2022          INTERPRETATION:  CLINICAL INFORMATION: Abdominal pain status post   abdominoplasty. Dropping hemoglobin. GIB postoperative.    COMPARISON: CT chest, abdomen and pelvis 6/5/2022    CONTRAST/COMPLICATIONS:  IV Contrast: Omnipaque 350  60 cc administered   0 cc discarded  Oral Contrast: NONE  Complications: None reported at time of study completion    PROCEDURE:  CT of the Abdomen and Pelviswas performed.  Sagittal and coronal reformats were performed.    FINDINGS:  LOWER CHEST: Mild bibasilar linear atelectasis.    LIVER: Within normal limits.  BILE DUCTS: Normal caliber.  GALLBLADDER: New layering high density within the gallbladder likely due   to vicarious excretion of IV contrast from prior exam.  SPLEEN: Within normal limits.  PANCREAS: Within normal limits.  ADRENALS: Within normal limits.  KIDNEYS/URETERS: The kidneys enhance symmetrically. No hydronephrosis.    BLADDER: Partially obscured by streak artifact.  REPRODUCTIVE ORGANS: Partially obscured by streak artifact.    BOWEL: No bowel obstruction. No bowel wall thickening or inflammatory   changes. Appendix is normal. Limited assessment for active   gastrointestinal bleeding on this single portal venous phase study.  PERITONEUM: No ascites.  VESSELS: Within normal limits.  RETROPERITONEUM/LYMPH NODES: No lymphadenopathy. No evidence of   retroperitoneal hemorrhage.  ABDOMINAL WALL: Redemonstrated 2 surgical drainsin the anterior   abdominal wall with associated abdominal wall postsurgical changes   including diffuse subcutaneous fat stranding, scattered foci of   subcutaneous air and layering high density fluid in the right greater   than left lower abdominalwall, grossly unchanged from prior study. No   drainable collection.  BONES: Redemonstrated L5-S1 posterior lumbar fusion and disc spacer. No   acute osseous abnormality.    IMPRESSION:  No evidence of retroperitoneal hemorrhage. Limited assessment for   gastrointestinal bleeding.  Grossly unchanged anterior abdominal wall postsurgical changes with   subcutaneous edema, air and small layering hematomas, right greater than   left. No drainable collection.      Assessment :   28YO F with PMHx of Marysol-Danlos, stage 4 endometriosis, fibromyalgia, chronic back pain / chronic opioid use (follows w/ Dr. Azra Caldera as O/P, who presents to ED s/p extended abdominoplasty on 6/2/22 (per plastic surgery, Dr. Hiwot Pop) d/t chronic panniculitis and loose hanging skin causing lower back pain admitted with wound dehiscence/small open wound to mid lower abdominal incisional site. Had been to [facility] and [facility] over past week and noted to be anemic sp transfusions. Had 3 CT AP- + for hematomas, right greater than left. No drainable collection. Had fever at home per pt and severe pain. There is a concern for infection.      Plan :   Cont Vanc   Add Rocephin  Fu wound culture  Trend temps and cbc  Pt needs pain management     D/w Dr Juan      Continue with present regiment .  Approptiate use of antibiotics and adverse effects reviewed.      I have discussed the above plan of care with patient/boyfriendl. They expressed understanding of the treatment plan . Risks, benefits and alternatives discussed in detail. I have asked if they have any questions or concerns and appropriately addressed them to the best of my ability .      > 45 minutes spent in direct patient care reviewing  the notes, lab data/ imaging , discussion with multidisciplinary team. All questions were addressed and answered to the best of my capacity .    Thank you for allowing me to participate in the care of your patient .      Tong Marin MD  Infectious Disease  [phone]   HPI:  26YO F with PMHx of Marysol-Danlos, stage 4 endometriosis, fibromyalgia, chronic back pain / chronic opioid use (follows w/ Dr. Azra Caldera as O/P, who presents to ED s/p extended abdominoplasty on 6/2/22 (per plastic surgery, Dr. Hiwot Pop) d/t chronic panniculitis and loose hanging skin causing lower back pain admitted with wound dehiscence/small open wound to mid lower abdominal incisional site. Had been to [facility] and [facility] over pats week and noted to be anemic sp transfusions. Had 3 CT AP- + for hematomas, right greater than left. No drainable collection. Had fever at home per pt and severe pain.     Infectious Disease consult was called to evaluate pt and for antibiotic management.      Past Medical & Surgical Hx:  PAST MEDICAL & SURGICAL HISTORY:  GERD (gastroesophageal reflux disease)  Marysol-Danlos disease  Endometriosis  Fibromyalgia  History of tonsillectomy  History of abdominoplasty      Social History--  EtOH: denies   Tobacco: denies   Drug Use: denies     FAMILY HISTORY:  Noncontributory    Allergies  penicillins (Rash)  sulfa drugs (Rash)    Intolerances  NONE      Home Medications:  clindamycin 300 mg oral capsule: 1 cap(s) orally every 6 hours (14 Jun 2022 17:36)  cyclobenzaprine 10 mg oral tablet: 1 tab(s) orally 3 times a day, As needed, Muscle Spasm (14 Jun 2022 17:36)  Dilaudid 2 mg oral tablet: 1 tab(s) orally 3 times a day as needed for Breakthrough pain (14 Jun 2022 17:36)  ferrous sulfate:  (14 Jun 2022 17:36)  gabapentin 600 mg oral tablet: 1 tab(s) orally 4 times a day    NOTE: [facility] SCRIPT FILLED FOR 3 TIMES DAY DOSING (14 Jun 2022 17:36)  Percocet 10/325 oral tablet: 1 tab(s) orally every 6 hours, As Needed    NOTE: PATIENT STATES JUST TAKING &quot;OXYCODONE&quot; BUT LOCAL PHARMACY HAS ONLY EVER FILLED PERCOCET (14 Jun 2022 17:36)      Current Inpatient Medications :    ANTIBIOTICS:   vancomycin  IVPB 1000 milliGRAM(s) IV Intermittent every 12 hours      OTHER RELEVANT MEDICATIONS :  BACItracin   Ointment 1 Application(s) Topical once  cyclobenzaprine 10 milliGRAM(s) Oral three times a day PRN  gabapentin 600 milliGRAM(s) Oral four times a day  HYDROmorphone  Injectable 1 milliGRAM(s) IV Push every 6 hours PRN  influenza   Vaccine 0.5 milliLiter(s) IntraMuscular once  oxycodone    5 mG/acetaminophen 325 mG 1 Tablet(s) Oral every 3 hours PRN      ROS:  CONSTITUTIONAL:  Negative fever or chills  EYES:  Negative  blurry vision or double vision  CARDIOVASCULAR:  Negative for chest pain or palpitations  RESPIRATORY:  Negative for cough, wheezing, or SOB   GASTROINTESTINAL:  Negative for nausea, vomiting, diarrhea, constipation, + abdominal pain  GENITOURINARY:  Negative frequency, urgency , dysuria or hematuria   NEUROLOGIC:  No headache, confusion, dizziness, lightheadedness  All other systems were reviewed and are negative      I&O's Detail    14 Jun 2022 07:01  -  15 Crow 2022 07:00  --------------------------------------------------------  IN:  Total IN: 0 mL    OUT:    Drain (mL): 25 mL    Drain (mL): 50 mL  Total OUT: 75 mL    Total NET: -75 mL      Physical Exam:  Vital Signs Last 24 Hrs  T(C): 36.7 (15 Crow 2022 09:31), Max: 37.4 (15 Crow 2022 00:12)  T(F): 98 (15 Crow 2022 09:31), Max: 99.3 (15 Crow 2022 00:12)  HR: 91 (15 Crow 2022 09:31) (86 - 108)  BP: 115/82 (15 Crow 2022 09:31) (91/62 - 137/79)  RR: 19 (15 Crow 2022 09:31) (15 - 19)  SpO2: 99% (15 Crow 2022 09:31) (97% - 100%)  Height (cm): 157.5 (06-15 @ 00:04)  Weight (kg): 70.3 (06-15 @ 00:04)  BMI (kg/m2): 28.3 (06-15 @ 00:04)  BSA (m2): 1.72 (06-15 @ 00:04)    General: in pain  Neck: supple, trachea midline  Lungs: clear, no wheeze/rhonchi  Cardiovascular: regular rate and rhythm, S1 S2  Abdomen: soft, nontender, ND, bowel sounds normal  Lower abd surgical site c/d/i mid surgical site with small area of wound dehiscence mild serous drainage  LAURITA x1 left with serosan right with old blood  Neurological:  alert and oriented x3  Skin: no rash  Extremities: no cyanosis/clubbing/edema    Labs:       RECENT CULTURES:          RADIOLOGY & ADDITIONAL STUDIES:    ACC: [id number] EXAM:  CT ABDOMEN AND PELVIS                          PROCEDURE DATE:  06/07/2022          INTERPRETATION:  CLINICAL INFORMATION: 26 years  Female with abdominal   pain increased girth  s/p abdominoplasty    r/o bleeding.    COMPARISON: Contrast-enhanced CT 6/6/2022 and 6/5/2022    CONTRAST/COMPLICATIONS:  IV Contrast: NONE  Oral Contrast: NONE  Complications: None reported at time of study completion    PROCEDURE:  CT of the Abdomen and Pelvis was performed.  Sagittal and coronalreformats were performed.    LIMITATIONS: Evaluation of the solid organs, vascular structures and GI   tract is limited without oral and IV contrast.    FINDINGS:  LOWER CHEST: Within normal limits.    LIVER: Within normal limits.  BILE DUCTS: Normalcaliber.  GALLBLADDER: Vicarious excretion of contrast.  SPLEEN: Within normal limits.  PANCREAS: Within normal limits.  ADRENALS: Within normal limits.  KIDNEYS/URETERS: Within normal limits.    BLADDER: Within normal limits.  REPRODUCTIVE ORGANS: Obscured by artifact.    BOWEL: No bowel obstruction. Appendix is not visualized. No evidence of   inflammation in the pericecal region.. Mild colonic fecal burden.  PERITONEUM: No ascites.  VESSELS: Within normal limits.  RETROPERITONEUM/LYMPH NODES: No lymphadenopathy.  ABDOMINAL WALL: Postoperative changes. 2 surgical drains reidentified in   the lower abdominal wall. Diffuse subcutaneous fat stranding. Scattered   foci of subcutaneous air reidentified. Small collections of layering high   density fluid measuring up to 3 cm in the anterior abdomen unchanged.  BONES: L5-S1 posterior fusion and disc spacer.    IMPRESSION:  No evidence of intra-abdominal hemorrhage.    Reidentified postoperative changes in the abdominal wall including small   layering fluid collections, subcutaneous edema and subcutaneous air.      ACC: [id number] EXAM:  CT ABDOMEN AND PELVIS IC                          PROCEDURE DATE:  06/06/2022          INTERPRETATION:  CLINICAL INFORMATION: Abdominal pain status post   abdominoplasty. Dropping hemoglobin. GIB postoperative.    COMPARISON: CT chest, abdomen and pelvis 6/5/2022    CONTRAST/COMPLICATIONS:  IV Contrast: Omnipaque 350  60 cc administered   0 cc discarded  Oral Contrast: NONE  Complications: None reported at time of study completion    PROCEDURE:  CT of the Abdomen and Pelviswas performed.  Sagittal and coronal reformats were performed.    FINDINGS:  LOWER CHEST: Mild bibasilar linear atelectasis.    LIVER: Within normal limits.  BILE DUCTS: Normal caliber.  GALLBLADDER: New layering high density within the gallbladder likely due   to vicarious excretion of IV contrast from prior exam.  SPLEEN: Within normal limits.  PANCREAS: Within normal limits.  ADRENALS: Within normal limits.  KIDNEYS/URETERS: The kidneys enhance symmetrically. No hydronephrosis.    BLADDER: Partially obscured by streak artifact.  REPRODUCTIVE ORGANS: Partially obscured by streak artifact.    BOWEL: No bowel obstruction. No bowel wall thickening or inflammatory   changes. Appendix is normal. Limited assessment for active   gastrointestinal bleeding on this single portal venous phase study.  PERITONEUM: No ascites.  VESSELS: Within normal limits.  RETROPERITONEUM/LYMPH NODES: No lymphadenopathy. No evidence of   retroperitoneal hemorrhage.  ABDOMINAL WALL: Redemonstrated 2 surgical drainsin the anterior   abdominal wall with associated abdominal wall postsurgical changes   including diffuse subcutaneous fat stranding, scattered foci of   subcutaneous air and layering high density fluid in the right greater   than left lower abdominalwall, grossly unchanged from prior study. No   drainable collection.  BONES: Redemonstrated L5-S1 posterior lumbar fusion and disc spacer. No   acute osseous abnormality.    IMPRESSION:  No evidence of retroperitoneal hemorrhage. Limited assessment for   gastrointestinal bleeding.  Grossly unchanged anterior abdominal wall postsurgical changes with   subcutaneous edema, air and small layering hematomas, right greater than   left. No drainable collection.      Assessment :   26YO F with PMHx of Marysol-Danlos, stage 4 endometriosis, fibromyalgia, chronic back pain / chronic opioid use (follows w/ Dr. Azra Caldera as O/P, who presents to ED s/p extended abdominoplasty on 6/2/22 (per plastic surgery, Dr. Hiwot Pop) d/t chronic panniculitis and loose hanging skin causing lower back pain admitted with wound dehiscence/small open wound to mid lower abdominal incisional site. Had been to [facility] over past week and noted to be anemic sp transfusions. Had 3 CT AP- + for hematomas, right greater than left. No drainable collection. Had fever at home per pt and severe pain. There is a concern for infection.      Plan :   Cont Vancomycin  Add Rocephin  Fu wound culture  Trend temps and cbc  Pt needs pain management - Dr Yates called    D/w Dr Juan      Continue with present regiment .  Approptiate use of antibiotics and adverse effects reviewed.      I have discussed the above plan of care with patient/boyfriendl. They expressed understanding of the treatment plan . Risks, benefits and alternatives discussed in detail. I have asked if they have any questions or concerns and appropriately addressed them to the best of my ability .      > 45 minutes spent in direct patient care reviewing  the notes, lab data/ imaging , discussion with multidisciplinary team. All questions were addressed and answered to the best of my capacity .    Thank you for allowing me to participate in the care of your patient .      Tong Marin MD  Infectious Disease  [phone]

## 2022-06-15 NOTE — BH CONSULTATION LIAISON ASSESSMENT NOTE - CURRENT MEDICATION
MEDICATIONS  (STANDING):  cefTRIAXone   IVPB 2000 milliGRAM(s) IV Intermittent every 24 hours  gabapentin 600 milliGRAM(s) Oral four times a day  influenza   Vaccine 0.5 milliLiter(s) IntraMuscular once  vancomycin  IVPB 1000 milliGRAM(s) IV Intermittent every 12 hours    MEDICATIONS  (PRN):  cyclobenzaprine 10 milliGRAM(s) Oral three times a day PRN Muscle Spasm  diazepam    Tablet 2 milliGRAM(s) Oral two times a day PRN Anxiety  HYDROmorphone  Injectable 1 milliGRAM(s) IV Push every 6 hours PRN Severe Pain (7 - 10)  oxycodone    5 mG/acetaminophen 325 mG 1 Tablet(s) Oral every 3 hours PRN Moderate Pain (4 - 6)  QUEtiapine 25 milliGRAM(s) Oral every 6 hours PRN Anxiety

## 2022-06-15 NOTE — PATIENT PROFILE ADULT - FUNCTIONAL ASSESSMENT - BASIC MOBILITY 3.
Telephone Encounter by Sandra Johnson at 4/26/2021 10:22 AM     Author: Sandra Johnson Service: -- Author Type: Patient Access    Filed: 4/26/2021 10:34 AM Encounter Date: 4/23/2021 Status: Signed    : Sandra Johnson (Patient Access)       Prior Authorization Not Needed     Insurance details: The requested medication does not require a Prior Authorization, it is currently on the patient's formulary.    Pharmacy details: [phone] [id number] #[id number] - [place], [place] - [address] AT [facility]: I called to confirm and it does process through for a 90 days supply for $103.00. However, since the provider will need to provide a statement for the Tier Exception. I asked that they place it on hold until the patient calls for her refill.                3 = A little assistance

## 2022-06-15 NOTE — PATIENT PROFILE ADULT - FALL HARM RISK - HARM RISK INTERVENTIONS

## 2022-06-16 ENCOUNTER — EMERGENCY (EMERGENCY)
Facility: HOSPITAL | Age: 27
LOS: 1 days | Discharge: LEFT BEFORE DISCHARGE | End: 2022-06-16
Attending: EMERGENCY MEDICINE | Admitting: EMERGENCY MEDICINE
Payer: COMMERCIAL

## 2022-06-16 ENCOUNTER — TRANSCRIPTION ENCOUNTER (OUTPATIENT)
Age: 27
End: 2022-06-16

## 2022-06-16 VITALS
TEMPERATURE: 99 F | HEIGHT: 62 IN | WEIGHT: 151.02 LBS | SYSTOLIC BLOOD PRESSURE: 131 MMHG | OXYGEN SATURATION: 100 % | HEART RATE: 99 BPM | DIASTOLIC BLOOD PRESSURE: 85 MMHG | RESPIRATION RATE: 18 BRPM

## 2022-06-16 VITALS
HEART RATE: 90 BPM | SYSTOLIC BLOOD PRESSURE: 114 MMHG | OXYGEN SATURATION: 100 % | RESPIRATION RATE: 18 BRPM | DIASTOLIC BLOOD PRESSURE: 81 MMHG | TEMPERATURE: 98 F

## 2022-06-16 DIAGNOSIS — Z98.890 OTHER SPECIFIED POSTPROCEDURAL STATES: Chronic | ICD-10-CM

## 2022-06-16 DIAGNOSIS — Z98.89 OTHER SPECIFIED POSTPROCEDURAL STATES: Chronic | ICD-10-CM

## 2022-06-16 LAB
BASOPHILS # BLD AUTO: 0.06 K/UL — SIGNIFICANT CHANGE UP (ref 0–0.2)
BASOPHILS NFR BLD AUTO: 0.5 % — SIGNIFICANT CHANGE UP (ref 0–2)
EOSINOPHIL # BLD AUTO: 0.18 K/UL — SIGNIFICANT CHANGE UP (ref 0–0.5)
EOSINOPHIL NFR BLD AUTO: 1.6 % — SIGNIFICANT CHANGE UP (ref 0–6)
HCT VFR BLD CALC: 33.8 % — LOW (ref 34.5–45)
HGB BLD-MCNC: 11 G/DL — LOW (ref 11.5–15.5)
IMM GRANULOCYTES NFR BLD AUTO: 0.5 % — SIGNIFICANT CHANGE UP (ref 0–1.5)
LYMPHOCYTES # BLD AUTO: 1.95 K/UL — SIGNIFICANT CHANGE UP (ref 1–3.3)
LYMPHOCYTES # BLD AUTO: 16.9 % — SIGNIFICANT CHANGE UP (ref 13–44)
MCHC RBC-ENTMCNC: 30.3 PG — SIGNIFICANT CHANGE UP (ref 27–34)
MCHC RBC-ENTMCNC: 32.5 GM/DL — SIGNIFICANT CHANGE UP (ref 32–36)
MCV RBC AUTO: 93.1 FL — SIGNIFICANT CHANGE UP (ref 80–100)
MONOCYTES # BLD AUTO: 0.44 K/UL — SIGNIFICANT CHANGE UP (ref 0–0.9)
MONOCYTES NFR BLD AUTO: 3.8 % — SIGNIFICANT CHANGE UP (ref 2–14)
NEUTROPHILS # BLD AUTO: 8.82 K/UL — HIGH (ref 1.8–7.4)
NEUTROPHILS NFR BLD AUTO: 76.7 % — SIGNIFICANT CHANGE UP (ref 43–77)
NRBC # BLD: 0 /100 WBCS — SIGNIFICANT CHANGE UP (ref 0–0)
PLATELET # BLD AUTO: 564 K/UL — HIGH (ref 150–400)
RBC # BLD: 3.63 M/UL — LOW (ref 3.8–5.2)
RBC # FLD: 13.1 % — SIGNIFICANT CHANGE UP (ref 10.3–14.5)
WBC # BLD: 11.51 K/UL — HIGH (ref 3.8–10.5)
WBC # FLD AUTO: 11.51 K/UL — HIGH (ref 3.8–10.5)

## 2022-06-16 PROCEDURE — 99285 EMERGENCY DEPT VISIT HI MDM: CPT

## 2022-06-16 PROCEDURE — 73080 X-RAY EXAM OF ELBOW: CPT | Mod: 26,LT

## 2022-06-16 PROCEDURE — 99284 EMERGENCY DEPT VISIT MOD MDM: CPT

## 2022-06-16 PROCEDURE — 86900 BLOOD TYPING SEROLOGIC ABO: CPT

## 2022-06-16 PROCEDURE — 73080 X-RAY EXAM OF ELBOW: CPT

## 2022-06-16 PROCEDURE — 71045 X-RAY EXAM CHEST 1 VIEW: CPT

## 2022-06-16 PROCEDURE — 96375 TX/PRO/DX INJ NEW DRUG ADDON: CPT

## 2022-06-16 PROCEDURE — 87635 SARS-COV-2 COVID-19 AMP PRB: CPT

## 2022-06-16 PROCEDURE — 87070 CULTURE OTHR SPECIMN AEROBIC: CPT

## 2022-06-16 PROCEDURE — 86901 BLOOD TYPING SEROLOGIC RH(D): CPT

## 2022-06-16 PROCEDURE — 93971 EXTREMITY STUDY: CPT

## 2022-06-16 PROCEDURE — 87186 SC STD MICRODIL/AGAR DIL: CPT

## 2022-06-16 PROCEDURE — 73090 X-RAY EXAM OF FOREARM: CPT | Mod: 26,LT

## 2022-06-16 PROCEDURE — 73090 X-RAY EXAM OF FOREARM: CPT

## 2022-06-16 PROCEDURE — 99284 EMERGENCY DEPT VISIT MOD MDM: CPT | Mod: 25

## 2022-06-16 PROCEDURE — 87077 CULTURE AEROBIC IDENTIFY: CPT

## 2022-06-16 PROCEDURE — 81001 URINALYSIS AUTO W/SCOPE: CPT

## 2022-06-16 PROCEDURE — 84702 CHORIONIC GONADOTROPIN TEST: CPT

## 2022-06-16 PROCEDURE — 85730 THROMBOPLASTIN TIME PARTIAL: CPT

## 2022-06-16 PROCEDURE — 36415 COLL VENOUS BLD VENIPUNCTURE: CPT

## 2022-06-16 PROCEDURE — 87040 BLOOD CULTURE FOR BACTERIA: CPT

## 2022-06-16 PROCEDURE — 93971 EXTREMITY STUDY: CPT | Mod: 26,LT

## 2022-06-16 PROCEDURE — 99221 1ST HOSP IP/OBS SF/LOW 40: CPT

## 2022-06-16 PROCEDURE — 96365 THER/PROPH/DIAG IV INF INIT: CPT

## 2022-06-16 PROCEDURE — 85610 PROTHROMBIN TIME: CPT

## 2022-06-16 PROCEDURE — 83605 ASSAY OF LACTIC ACID: CPT

## 2022-06-16 PROCEDURE — 86850 RBC ANTIBODY SCREEN: CPT

## 2022-06-16 PROCEDURE — 80053 COMPREHEN METABOLIC PANEL: CPT

## 2022-06-16 PROCEDURE — 85025 COMPLETE CBC W/AUTO DIFF WBC: CPT

## 2022-06-16 RX ORDER — QUETIAPINE FUMARATE 200 MG/1
1 TABLET, FILM COATED ORAL
Qty: 60 | Refills: 0
Start: 2022-06-16 | End: 2022-06-30

## 2022-06-16 RX ORDER — HYDROMORPHONE HYDROCHLORIDE 2 MG/ML
1 INJECTION INTRAMUSCULAR; INTRAVENOUS; SUBCUTANEOUS EVERY 6 HOURS
Refills: 0 | Status: DISCONTINUED | OUTPATIENT
Start: 2022-06-16 | End: 2022-06-16

## 2022-06-16 RX ORDER — GABAPENTIN 400 MG/1
1 CAPSULE ORAL
Qty: 0 | Refills: 0 | DISCHARGE

## 2022-06-16 RX ORDER — CEFPODOXIME PROXETIL 100 MG
1 TABLET ORAL
Qty: 14 | Refills: 0
Start: 2022-06-16 | End: 2022-06-22

## 2022-06-16 RX ORDER — GABAPENTIN 400 MG/1
1 CAPSULE ORAL
Qty: 0 | Refills: 0 | DISCHARGE
Start: 2022-06-16

## 2022-06-16 RX ADMIN — GABAPENTIN 600 MILLIGRAM(S): 400 CAPSULE ORAL at 05:53

## 2022-06-16 RX ADMIN — GABAPENTIN 600 MILLIGRAM(S): 400 CAPSULE ORAL at 12:44

## 2022-06-16 RX ADMIN — HYDROMORPHONE HYDROCHLORIDE 1 MILLIGRAM(S): 2 INJECTION INTRAMUSCULAR; INTRAVENOUS; SUBCUTANEOUS at 02:17

## 2022-06-16 RX ADMIN — HYDROMORPHONE HYDROCHLORIDE 1 MILLIGRAM(S): 2 INJECTION INTRAMUSCULAR; INTRAVENOUS; SUBCUTANEOUS at 06:00

## 2022-06-16 RX ADMIN — HYDROMORPHONE HYDROCHLORIDE 1 MILLIGRAM(S): 2 INJECTION INTRAMUSCULAR; INTRAVENOUS; SUBCUTANEOUS at 05:53

## 2022-06-16 RX ADMIN — HYDROMORPHONE HYDROCHLORIDE 1 MILLIGRAM(S): 2 INJECTION INTRAMUSCULAR; INTRAVENOUS; SUBCUTANEOUS at 01:47

## 2022-06-16 RX ADMIN — HYDROMORPHONE HYDROCHLORIDE 1 MILLIGRAM(S): 2 INJECTION INTRAMUSCULAR; INTRAVENOUS; SUBCUTANEOUS at 09:38

## 2022-06-16 RX ADMIN — GABAPENTIN 600 MILLIGRAM(S): 400 CAPSULE ORAL at 00:24

## 2022-06-16 RX ADMIN — HYDROMORPHONE HYDROCHLORIDE 1 MILLIGRAM(S): 2 INJECTION INTRAMUSCULAR; INTRAVENOUS; SUBCUTANEOUS at 15:37

## 2022-06-16 RX ADMIN — Medication 250 MILLIGRAM(S): at 05:53

## 2022-06-16 NOTE — ED ADULT TRIAGE NOTE - SPO2 (%)
SHILO Wheatley is a 59 y.o. female. HPI/Chief C/O:  Chief Complaint   Patient presents with    Sinus Problem     c/o headache; sore throat; bilateral ear pain; eye pain; sinus pressure; slight cough-denies production; denies sob, fever or loss or taste or smell. Symptoms started 3 days ago. patient states she was tested for Covid at work on friday. She does not have those results yet.  Other     patient consents to telemedicine visit [facility]     Allergies   Allergen Reactions    Flagyl [Metronidazole] Rash    Omeprazole Diarrhea and Nausea And Vomiting     As well as dizziness    Vicodin [Hydrocodone-Acetaminophen] Nausea And Vomiting   this 59year old female presents with nasal congestion, slight cough, sore throat, nely. Ear pain, and sinus pain for 3 days. Pt denies fever, denies chest pain, and denies shortness of breath. Pt has covid test 3 days ago, no results yet. Pt has improving. C. Difficile colitis and follows with GI specialist.     TeleMedicine Video Visit    This visit was performed as a virtual video visit using a synchronous, two-way, audio-video telehealth technology platform. Patient identification was verified at the start of the visit, including the patient's telephone number and physical location. I discussed with the patient the nature of our telehealth visits, that:     1. Due to the nature of an audio- video modality, the only components of a physical exam that could be done are the elements supported by direct observation. 2. I would evaluate the patient and recommend diagnostics and treatments based on my assessment. 3. If it was felt that the patient should be evaluated in clinic or an emergency room setting, then they would be directed there. 4. Our sessions are not being recorded and that personal health information is protected. 5. Our team would provide follow up care in person if/when the patient needs it.        Patient does agree to Does not bruise/bleed easily. Psychiatric/Behavioral: Negative for agitation, behavioral problems, confusion, decreased concentration, dysphoric mood, hallucinations, self-injury, sleep disturbance and suicidal ideas. The patient is nervous/anxious. The patient is not hyperactive. Past Medical/Surgical Hx;  Reviewed with patient      Diagnosis Date    Diabetes mellitus ([facility] [address]     Diverticulitis     Frequent UTI     Hiatal hernia     Hypertension     Kidney stones     PONV (postoperative nausea and vomiting)     Thyroid disease      Past Surgical History:   Procedure Laterality Date    CHOLECYSTECTOMY, LAPAROSCOPIC N/A 6/20/2019    CHOLECYSTECTOMY LAPAROSCOPIC performed by Iqra Barriga MD at [address] [place] [address] ECHO COMPL W DOP COLOR FLOW  8/15/2013         ENDOSCOPY, COLON, DIAGNOSTIC      HYSTERECTOMY      RECTAL PROLAPSE REPAIR         Past Family Hx:  Reviewed with patient  History reviewed. No pertinent family history. Social Hx:  Reviewed with patient  Social History     Tobacco Use    Smoking status: Never Smoker    Smokeless tobacco: Never Used   Substance Use Topics    Alcohol use: No       OBJECTIVE  LMP  (LMP Unknown)     Problem List:  Saumya Easton does not have any pertinent problems on file. PHYS EX:  Pt has nasal congestion    ASSESSMENT/PLAN  Saumya Easton was seen today for sinus problem and other. Diagnoses and all orders for this visit:    Acute bacterial sinusitis  -     methylPREDNISolone (MEDROL DOSEPACK) 4 MG tablet; Take as directed  Not controlled. Pt instructed to take cough syrup. C. difficile colitis  Improving. GI specialist.     Pt instructed if any worse go ED ASAP.     Outpatient Encounter Medications as of 11/23/2020   Medication Sig Dispense Refill    methylPREDNISolone (MEDROL DOSEPACK) 4 MG tablet Take as directed 1 kit 0    allopurinol (ZYLOPRIM) 300 MG tablet Take 0.5 tablets by mouth daily 45 tablet 1    metFORMIN (GLUCOPHAGE) 500 MG tablet Take 1 tablet by mouth 2 times daily (with meals) 180 tablet 1    levothyroxine (SYNTHROID) 50 MCG tablet Take 1 tablet by mouth daily 90 tablet 1    LANSOPRAZOLE PO Take 50 mg by mouth daily       Probiotic Product (PROBIOTIC PO) Take by mouth      [DISCONTINUED] vancomycin (VANCOCIN) 250 MG capsule Take 250 mg by mouth 4 times daily       No facility-administered encounter medications on file as of 11/23/2020. No follow-ups on file.         Reviewed recent labs related to Yahir's current problems      Discussed importance of regular Health Maintenance follow up  Health Maintenance   Topic    Hepatitis C screen     Diabetic foot exam     HIV screen     DTaP/Tdap/Td vaccine (1 - Tdap)    Shingles Vaccine (1 of 2)    Diabetic retinal exam     Flu vaccine (1)    Diabetic microalbuminuria test     Lipid screen     TSH testing     A1C test (Diabetic or Prediabetic)     Breast cancer screen     Colon cancer screen colonoscopy     Hepatitis A vaccine     Hib vaccine     Meningococcal (ACWY) vaccine     Pneumococcal 0-64 years Vaccine 100

## 2022-06-16 NOTE — PROGRESS NOTE ADULT - SUBJECTIVE AND OBJECTIVE BOX
- 27F presents to the ED s/p extended abdominoplasty for chronic panniculitis and loose hanging skin on 6/2/22 with a hx. of Ehler Danlos, fibromyalgia, GERD, presented to the ED today for concern of wound dehiscence.  Patient reports foul smelling odor from her right drain output at home and went to ED.  Patient missed her appointment to see her yesterday because "it was her birthday and sister's graduation" .  Patient reports to the ED a temperature of 100.7F at home, although when speaking to the patient this morning, she did not report any of these symptoms or concerns.  Patient also told the ER staff that I was refusing to see her in the office this week.  When asked about it directly the patient denied saying this to the ER staff, and did not "want them to minimize her concerns".   Patient has not been showering as directed and still had saturated steri-strips along her incision since her surgery.   She also wanted me to communicate to the ER staff that she needs her pain medication and Valium.    Recent hospital admission 6/6 summary:  Patient reported melena at home, and therefore patient was admitted for H/H monitoring, GI consult, and received  CT ab/pelvis x3 from [place] and [place] ER's, for her abdominal pain complaints and stating that she was "internally bleeding".  These findings were all consistent with typical postop findings, unchanged, and within normal limits.  She did not have any further episodes of melena in the hospital or at home.  She was seen by psychiatry for her anxiety and complaints despite normal CT findings.  She was recommended to start an SSRI, but she did not want treatment at the time.   GI was recommended on an outpatient basis.  Patient has been in contact with me and nursing staff since discharge from hospital with no issues or concerns until today 6/14/22.     VSS, afebrile in ER    Exam:  Gen: AAO, anxious   Abd:  1.0 x .5 cm area of wound separation along midline incision site with minimal fibrinous exudate. Pink granulation tissue present, no purulent output or drainage notes on exam. No erythema, no cellulitis. Rest of incision in closed, clean and dry.  R LAURITA drain with fibrinous exudate in bulb and dark serosanguineous output.  L LAURITA drain with serous drainage.  Extrem: minimal ecchymosis to out hips, soft, nontender      A/P: 27F s/p extended abdominoplasty POD12,with multiple medical comorbidities, presenting with leukocytosis likely to wound dehiscence     - admission for IV abx due to patient's reported 100.7F at home, and leukocytosis.  Trend WBC and if improved, please DC on PO abx.  - ID consultation for recommendation for PO medications on discharge  - Wound care as follows: Bacitracin to wound site, 4x4 gauze.  Wound measures 1x 0.5cm midline incision.  Does not seem to have cellulitis or surrounding erythema.  Advised patient that she needs to shower QD and keep incisions clean and dry.  Patient with history of delayed wound healing due to history of Marysol Danlos.  Will monitor wound as it evolves.  Wound appeared too small to culture   - Patient agrees and understand plan. Comminuted in detail with patient and her boyfriend at bedside, Please call with any questions or concerns, thank you   - drain care: (bacitracin QD and gauze)  - If discharged patient will see me in the office on Thursday 6/16 for possible drain removal     [phone]
     JEFERSON SAM is a 27yFemale , patient examined and chart reviewed.    INTERVAL HPI/ OVERNIGHT EVENTS:   Afebrile. Still with pain issues.    PAST MEDICAL & SURGICAL HISTORY:  GERD (gastroesophageal reflux disease)  Marysol-Danlos disease  Endometriosis  Fibromyalgia  History of tonsillectomy  History of abdominoplasty      For details regarding the patient's social history, family history, and other miscellaneous elements, please refer the initial infectious diseases consultation and/or the admitting history and physical examination for this admission.    ROS:  CONSTITUTIONAL:  Negative fever or chills  EYES:  Negative  blurry vision or double vision  CARDIOVASCULAR:  Negative for chest pain or palpitations  RESPIRATORY:  Negative for cough, wheezing, or SOB   GASTROINTESTINAL:  Negative for nausea, vomiting, diarrhea, constipation, or abdominal pain  GENITOURINARY:  Negative frequency, urgency or dysuria  NEUROLOGIC:  No headache, confusion, dizziness, lightheadedness  All other systems were reviewed and are negative     ALLERGIES:  penicillins (Rash)  sulfa drugs (Rash)      Current inpatient medications :    ANTIBIOTICS/RELEVANT:  cefTRIAXone   IVPB 2000 milliGRAM(s) IV Intermittent every 24 hours  vancomycin  IVPB 1000 milliGRAM(s) IV Intermittent every 12 hours      cyclobenzaprine 10 milliGRAM(s) Oral three times a day PRN  diazepam    Tablet 2 milliGRAM(s) Oral two times a day PRN  gabapentin 600 milliGRAM(s) Oral four times a day  HYDROmorphone  Injectable 1 milliGRAM(s) IV Push every 6 hours PRN  oxycodone    5 mG/acetaminophen 325 mG 1 Tablet(s) Oral every 3 hours PRN  QUEtiapine 25 milliGRAM(s) Oral every 6 hours PRN      Objective:    06-15 @ : @ 07:00  --------------------------------------------------------  IN: 0 mL / OUT: 70 mL / NET: -70 mL     @ : @ 14:55  --------------------------------------------------------  IN: 0 mL / OUT: 50 mL / NET: -50 mL      T(C): 36.9 (22 @ 13:50), Max: 36.9 (22 @ 13:50)  HR: 90 (22 @ 13:50) (83 - 98)  BP: 114/81 (22 @ 13:50) (97/67 - 114/81)  RR: 18 (22 @ 13:50) (16 - 18)  SpO2: 100% (22 @ 13:50) (98% - 100%)      Physical Exam:  General:  no acute distress  Neck: supple, trachea midline  Lungs: clear, no wheeze/rhonchi  Cardiovascular: regular rate and rhythm, S1 S2  Abdomen: soft, nontender,  bowel sounds normal  surgical site looks clean no drainage no purulence no erythema  LAURITA x 2 ( left old blood right serosang )  Neurological: alert and oriented x3  Skin: no rash  Extremities: no edema        LABS:                          11.0   11.51 )-----------( 564      ( 2022 13:23 )             33.8       06-15    136  |  102  |  12  ----------------------------<  99  3.7   |  28  |  0.58    Ca    9.1      15 Crow 2022 08:29    TPro  7.2  /  Alb  3.2<L>  /  TBili  0.4  /  DBili  x   /  AST  10  /  ALT  12  /  AlkPhos  51  06-15      PT/INR - ( 2022 15:21 )   PT: 14.3 sec;   INR: 1.21 ratio         PTT - ( 2022 15:21 )  PTT:41.5 sec  Urinalysis Basic - ( 2022 18:34 )    Color: Yellow / Appearance: Clear / S.020 / pH: x  Gluc: x / Ketone: Negative  / Bili: Negative / Urobili: Negative mg/dL   Blood: x / Protein: Negative mg/dL / Nitrite: Negative   Leuk Esterase: Negative / RBC: 0-2 /HPF / WBC x   Sq Epi: x / Non Sq Epi: x / Bacteria: x      MICROBIOLOGY:    Culture - Blood (collected 2022 15:21)  Source: .Blood Blood  Preliminary Report (15 Crow 2022 23:01):    No growth to date.    Culture - Blood (collected 2022 15:21)  Source: .Blood Blood  Preliminary Report (15 Crow 2022 23:01):    No growth to date.      RADIOLOGY & ADDITIONAL STUDIES:    Assessment :  26YO F with PMHx of Marysol-Danlos, stage 4 endometriosis, fibromyalgia, chronic back pain / chronic opioid use (follows w/ Dr. Azra Caldera as O/P, who presents to ED s/p extended abdominoplasty on 22 (per plastic surgery, Dr. Hiwot Pop) d/t chronic panniculitis and loose hanging skin causing lower back pain admitted with wound dehiscence/small open wound to mid lower abdominal incisional site. Had been to [facility] over past week and noted to be anemic sp transfusions. Had 3 CT AP- + for hematomas, right greater than left. No drainable collection. Had fever at home per pt and severe pain. There is a concern for infection.  Clinically doing well from ID standpoint  Surgical site looks clean no erythema no purulence  Pain issues most pressing issue for pt    Plan :   On Vancomycin  Rocephin  Change to po Doxy Vantin x 7 days   Fu wound culture  Fu with plastics outpt  Stable from ID standpoint for dc    D/w Dr Juan        Continue with present regiment.  Appropriate use of antibiotics and adverse effects reviewed.      I have discussed the above plan of care with patient/boyfriend in detail. They expressed understanding of the  treatment plan . Risks, benefits and alternatives discussed in detail. I have asked if they have any questions or concerns and appropriately addressed them to the best of my ability .    > 35 minutes were spent in direct patient care reviewing notes, medications ,labs data/ imaging , discussion with multidisciplinary team.    Thank you for allowing me to participate in care of your patient .    Tong Marin MD  Infectious Disease  [phone]
Patient seen and examined at bedside today with boyfriend at bedside as well.  Patient was seen by ID, WBC trending down, afebrile, tolerating diet, ambulating, LAURITA drains serosanguineous.  Patient is having pain management concerns and states that nursing staff is not addressing her pain concerns that she typically will take at home for her chronic pain issues.  Patient was reassured by myself that pain management will come as per PMD note.      Exam:  Gen: AAOx3, calm   Abd: No abdominal cellulitis or tenderness on exam, small midline wound dehishence present measuring ~1.0x 0.5cm in length. +granulation tissue, no purulent drainage or any drainage noted on exam from wound.  L LAURITA drain with dark serosanguineous output.  Rest of incision is CDI and closed.       A/P: s/p extended TT on 06/02/22 for panniculitis and chronic back pain     -- cont with ID recommendations with abx from culture   -- wound care with Bacitracin and gauze BID-TID.  Instructed patient that she must shower QD, and keep incision clean and dry.  Boyfriend at bedside also agrees and understands  -- Pain management recommended. Patient with chronic pain medication use history and polysubstance abuse history   -- no active plastic surgery intervention at this time. Will continue to follow

## 2022-06-16 NOTE — CONSULT NOTE ADULT - SUBJECTIVE AND OBJECTIVE BOX
HPI:   S27 y/o F with PMHx of Marysol-Danlos, stage 4 endometriosis, fibromyalgia, chronic back pain / chronic opioid use (follows w/ Dr. Azra Caldera as O/P, who presents to ED s/p extended abdominoplasty on 6/2/22 (per plastic surgery, Dr. Hiwot Pop) d/t chronic panniculitis and loose hanging skin causing lower back pain presents with c/o open wound to mid lower abdominal incisional site today. States that she has had exudative drainage from her right LAURITA drain as well. States that she has had fevers at home (tmax: 100.7F) and chills. Pt has had severe abdominal pain, found to be anemic (she was seen at [facility] ED on 6/5, D/Ephraim after PRBC transfusion and imaging revealing non-drainable abdominal wall hematoma, w/ instructions to stop Lovenox for post-op PPx which she self-administered (location: thighs) for 3 days, and re-presents from Dr. Pop's office to [facility]'s ED on 6/7). She had multiple CT scans that were preformed didnt reveal any source of bleeding.     Recent admiession tor bleed no evidance of bleed on CT abdomen CBC ramained stable and was discharged.    (15 Crow 2022 00:04)      PAST MEDICAL & SURGICAL HISTORY:  GERD (gastroesophageal reflux disease)      Marysol-Danlos disease      Endometriosis      Fibromyalgia      History of tonsillectomy      History of abdominoplasty      REVIEW OF SYSTEMS  CONSTITUTIONAL: +weakness, no fevers or chills  HEENT: denies blurred visions, sore throat  SKIN: denies new lesions, rash  CARDIOVASCULAR: no palpitations  RESPIRATORY: denies shortness of breath, sputum production  GASTROINTESTINAL: +abdominal pain, denies nausea  all other ROS is negative unless indicated above        MEDICATIONS  (STANDING):  cefTRIAXone   IVPB 2000 milliGRAM(s) IV Intermittent every 24 hours  gabapentin 600 milliGRAM(s) Oral four times a day  influenza   Vaccine 0.5 milliLiter(s) IntraMuscular once  vancomycin  IVPB 1000 milliGRAM(s) IV Intermittent every 12 hours    MEDICATIONS  (PRN):  cyclobenzaprine 10 milliGRAM(s) Oral three times a day PRN Muscle Spasm  diazepam    Tablet 2 milliGRAM(s) Oral two times a day PRN Anxiety  HYDROmorphone  Injectable 1 milliGRAM(s) IV Push every 4 hours PRN Severe Pain (7 - 10)  oxycodone    5 mG/acetaminophen 325 mG 1 Tablet(s) Oral every 3 hours PRN Moderate Pain (4 - 6)  QUEtiapine 25 milliGRAM(s) Oral every 6 hours PRN Anxiety      Allergies    penicillins (Rash)  sulfa drugs (Rash)    Intolerances        SOCIAL HISTORY:      FAMILY HISTORY:      Vital Signs Last 24 Hrs  T(C): 36.7 (16 Jun 2022 05:49), Max: 37.1 (15 Crow 2022 14:00)  T(F): 98 (16 Jun 2022 05:49), Max: 98.8 (15 Crow 2022 14:00)  HR: 83 (16 Jun 2022 05:49) (83 - 98)  BP: 97/67 (16 Jun 2022 05:49) (97/67 - 118/71)  BP(mean): --  RR: 17 (16 Jun 2022 05:49) (16 - 19)  SpO2: 98% (16 Jun 2022 05:49) (98% - 100%)    NAD / g A&Ox3/ Alert/ Obese  within defined normal limits  Total Care Sport/ Versa Care P500 bed                            9.6    11.34 )-----------( 482      ( 15 Crow 2022 08:30 )             29.7     06-15    136  |  102  |  12  ----------------------------<  99  3.7   |  28  |  0.58    Ca    9.1      15 Crow 2022 08:29    TPro  7.2  /  Alb  3.2<L>  /  TBili  0.4  /  DBili  x   /  AST  10  /  ALT  12  /  AlkPhos  51  06-15      PHYSICAL EXAM:    General: resting comfortably in bed; NAD  ENT: no nasal discharge; hearing intact  Gastrointestinal: soft tender, non-distended; Extremities: no gross deformities, able to move all four extremities,   Dermatologic: skin warm, ecchymotic, surgical line with small dehiscence mid line, mild erythema, scant serosanguinous drainage,   No odor,  warmth,  induration, fluctuance LAURITA in place  Neurologic: awake, alert,  Can  follow commands  Musculoskeletal/Vascular: ROM intact, no deformities/ contractures

## 2022-06-16 NOTE — DISCHARGE NOTE PROVIDER - HOSPITAL COURSE
HPI:   S27 y/o F with PMHx of Marysol-Danlos, stage 4 endometriosis, fibromyalgia, chronic back pain / chronic opioid use (follows w/ Dr. Azra Caldera as O/P, who presents to ED s/p extended abdominoplasty on 6/2/22 (per plastic surgery, Dr. Hiwot Pop) d/t chronic panniculitis and loose hanging skin causing lower back pain presents with c/o open wound to mid lower abdominal incisional site today. States that she has had exudative drainage from her right LAURITA drain as well. States that she has had fevers at home (tmax: 100.7F) and chills. Pt has had severe abdominal pain, found to be anemic (she was seen at [facility] ED on 6/5, D/Ephraim after PRBC transfusion and imaging revealing non-drainable abdominal wall hematoma, w/ instructions to stop Lovenox for post-op PPx which she self-administered (location: thighs) for 3 days, and re-presents from Dr. Pop's office to [facility]'s ED on 6/7). She had multiple CT scans that were preformed didnt reveal any source of bleeding.     Recent admission or bleed no evidance of bleed on CT abdomen CBC remained stable    stable and was discharged.    (15 Crow 2022 00:04)    Started patient on Vancomycin iv Dilaudid ID consulted   Changed to Doxycycline and Vantin x 7 days on discharge

## 2022-06-16 NOTE — ED ADULT NURSE NOTE - OBJECTIVE STATEMENT
pt presented to ED with c/o foreign body sensation to left antecubital area, pt was discharged from this hospital today at 5 pm, pt reports 'In my head the cannula might have been left in my arm , I was discharged from here today', left antecubital area tender to palpation, needle marks noted.

## 2022-06-16 NOTE — DISCHARGE NOTE NURSING/CASE MANAGEMENT/SOCIAL WORK - PATIENT PORTAL LINK FT
You can access the FollowMyHealth Patient Portal offered by [facility] by registering at the following website: [email or web address]. By joining [email or web address]’s FollowMyHealth portal, you will also be able to view your health information using other applications (apps) compatible with our system.

## 2022-06-16 NOTE — ED PROVIDER NOTE - PROGRESS NOTE DETAILS
spoke with pt about poss us results and poss FB vs superficial thrombophlebitis, pt wants to sign out ama, does not want to be admitted, states she wants to go to another facility for another opinion, understands and accepts risk of signing out ama, including poss dvt

## 2022-06-16 NOTE — ED ADULT NURSE REASSESSMENT NOTE - NS ED NURSE REASSESS COMMENT FT1
Patient continues to state that her needs are not being met. She insists that she has been here for several days and has not seen a Physician. Infectious Disease and Hospitalist at the bedside reminding her that they both saw her yesterday, to which she replied " I meant pain mgmt doctor". Patient is demanding of staff when she does not get her way, i.e. IV Dilaudid q4 hrs instead of q6hrs. She threatens to leave AMA despite being educated on the importance of antibiotic therapy to treat post operative complication. Patient became agitated while waiting for Physician to arrive. Multiple times throughout shift, patient would call for RN and ask about things like sepsis abd blood clots. RN educated patient on both topics and assured her that we take every measure posibble to prevent both conditions. During education, patient would cry, change topics, and then ask unrelated questions.
Patient received in no apparent distress. Patient expressed concern over her hemodynamic stability as she recently received a blood transfusion and believes she needs another one today. RN asked patient why she suspected the need for a transfusion and patient replied "because of my infection". RN educated patient on the clinical indications for blood transfusions and assured her that she does not currently have any symptoms or lab work that would be suggestive of the need for transfusion. Patient ran the gamut of emotions during this interaction. At one point she started crying and cursing and during the same sentence immediately changed to laughing and joking. Patient requires substantial education on her plan of care and frequent redirection to the topic being discussed as her thoughts can be disorganized and jump from topic to topic before concluding the initial topic.
pt received at change of shift, AAOx4 , no distress noted, VSS, abdominal dressing in place with LAURITA drains x2, POC discussed, pt admitted to [facility], report given to MADYSON Dunlap.

## 2022-06-16 NOTE — DISCHARGE NOTE PROVIDER - CARE PROVIDER_API CALL
Hiwot Pop (DO)  Plastic Surgery Surgery  [address] [place] [address]  Phone: [phone]  Fax: [phone]  Follow Up Time:

## 2022-06-16 NOTE — ED PROVIDER NOTE - PATIENT PORTAL LINK FT
You can access the FollowMyHealth Patient Portal offered by [facility] by registering at the following website: [email or web address]. By joining [employer]s FollowMyHealth portal, you will also be able to view your health information using other applications (apps) compatible with our system.

## 2022-06-16 NOTE — DISCHARGE NOTE PROVIDER - NSDCMRMEDTOKEN_GEN_ALL_CORE_FT
cefpodoxime 200 mg oral tablet: 1 tab(s) orally every 12 hours   cyclobenzaprine 10 mg oral tablet: 1 tab(s) orally 3 times a day, As needed, Muscle Spasm  Dilaudid 2 mg oral tablet: 1 tab(s) orally 3 times a day as needed for Breakthrough pain  doxycycline hyclate 100 mg oral capsule: 1 cap(s) orally 2 times a day   ferrous sulfate:   gabapentin 600 mg oral tablet: 1 tab(s) orally 4 times a day  Percocet 10/325 oral tablet: 1 tab(s) orally every 6 hours, As Needed    NOTE: PATIENT STATES JUST TAKING &quot;OXYCODONE&quot; BUT LOCAL PHARMACY HAS ONLY EVER FILLED PERCOCET  QUEtiapine 25 mg oral tablet: 1 tab(s) orally every 6 hours, As needed, Anxiety  
limitations in strength

## 2022-06-16 NOTE — DISCHARGE NOTE NURSING/CASE MANAGEMENT/SOCIAL WORK - NSDCPEFALRISK_GEN_ALL_CORE
For information on Fall & Injury Prevention, visit: [email or web address] OR  [email or web address] OR  https://www.cdc.gov/steadi/patient.html

## 2022-06-16 NOTE — CONSULT NOTE ADULT - ASSESSMENT
Patient presents s/p abdominoplasty with small dehiscence mid-surgical line. Patient's wound is under the care of plastic surgery.  At risk for altered tissue perfusion /YES  Impaired perfusion of peripheral tissue /YES  Continue  Nutrition (as tolerated)  Continue  Offloading   Continue Pericare  Care as per medicine will follow w/ you  Findings and recommendations discussed with MADYSON Colon  Thank you for this consult  Meghana Silva NP, [facility] [phone]

## 2022-06-16 NOTE — DISCHARGE NOTE PROVIDER - NSDCCPCAREPLAN_GEN_ALL_CORE_FT
PRINCIPAL DISCHARGE DIAGNOSIS  Diagnosis: Surgical wound infection  Assessment and Plan of Treatment: follow up with Plastics  Complete abx as prescribed      SECONDARY DISCHARGE DIAGNOSES  Diagnosis: Leukocytosis  Assessment and Plan of Treatment:

## 2022-06-16 NOTE — ED PROVIDER NOTE - NOTES
spoke with dr bethea about us, most likely imaging indicates thrombophlebitis, cannot visualize any cannula, recommends admission to medicine and poss ct vs mri to confirm if there is a FB

## 2022-06-16 NOTE — ED PROVIDER NOTE - OBJECTIVE STATEMENT
26yo female who presents with left arm swelling, was just discharged from [place] for abd wall complications and pt believes the cannula is still in her arm, c/o pain and swelling, no tingling or weakness no other complaints

## 2022-06-17 VITALS — SYSTOLIC BLOOD PRESSURE: 130 MMHG | DIASTOLIC BLOOD PRESSURE: 61 MMHG

## 2022-06-17 LAB
-  AMIKACIN: SIGNIFICANT CHANGE UP
-  AMIKACIN: SIGNIFICANT CHANGE UP
-  AMOXICILLIN/CLAVULANIC ACID: SIGNIFICANT CHANGE UP
-  AMPICILLIN/SULBACTAM: SIGNIFICANT CHANGE UP
-  AMPICILLIN: SIGNIFICANT CHANGE UP
-  AZTREONAM: SIGNIFICANT CHANGE UP
-  AZTREONAM: SIGNIFICANT CHANGE UP
-  CEFAZOLIN: SIGNIFICANT CHANGE UP
-  CEFEPIME: SIGNIFICANT CHANGE UP
-  CEFEPIME: SIGNIFICANT CHANGE UP
-  CEFOXITIN: SIGNIFICANT CHANGE UP
-  CEFTAZIDIME: SIGNIFICANT CHANGE UP
-  CEFTRIAXONE: SIGNIFICANT CHANGE UP
-  CIPROFLOXACIN: SIGNIFICANT CHANGE UP
-  CIPROFLOXACIN: SIGNIFICANT CHANGE UP
-  ERTAPENEM: SIGNIFICANT CHANGE UP
-  GENTAMICIN: SIGNIFICANT CHANGE UP
-  GENTAMICIN: SIGNIFICANT CHANGE UP
-  IMIPENEM: SIGNIFICANT CHANGE UP
-  LEVOFLOXACIN: SIGNIFICANT CHANGE UP
-  LEVOFLOXACIN: SIGNIFICANT CHANGE UP
-  MEROPENEM: SIGNIFICANT CHANGE UP
-  MEROPENEM: SIGNIFICANT CHANGE UP
-  PIPERACILLIN/TAZOBACTAM: SIGNIFICANT CHANGE UP
-  PIPERACILLIN/TAZOBACTAM: SIGNIFICANT CHANGE UP
-  TOBRAMYCIN: SIGNIFICANT CHANGE UP
-  TOBRAMYCIN: SIGNIFICANT CHANGE UP
-  TRIMETHOPRIM/SULFAMETHOXAZOLE: SIGNIFICANT CHANGE UP
METHOD TYPE: SIGNIFICANT CHANGE UP
METHOD TYPE: SIGNIFICANT CHANGE UP

## 2022-06-17 NOTE — ED ADULT NURSE REASSESSMENT NOTE - NS ED NURSE REASSESS COMMENT FT1
pt anxious crying ,boyfriend came to nurses station asking for tourniquet , explained pt we are awaiting sonogram result, MD made aware.
MD speaking to patient, pt's boyfriend recording MD conversations in his phone, security notified and recorded conversations were deleted. suddenly pt got out of the stretcher and signed AMA , pt anxious , ambulatory, no distress noted, left ED via wheel chair.   CD for X ray and sonogram given to pt.
pt wants to leave AMA, evaluated by MD at bedside, RN went to give discharge paper works pt reports feeling dizzy, vomited once, states she can't make full sentences , RN/MD noted pt speaking in full sentences. MD at bedside.

## 2022-06-18 LAB
-  AMIKACIN: SIGNIFICANT CHANGE UP
-  AMOXICILLIN/CLAVULANIC ACID: SIGNIFICANT CHANGE UP
-  AMPICILLIN/SULBACTAM: SIGNIFICANT CHANGE UP
-  AMPICILLIN: SIGNIFICANT CHANGE UP
-  AZTREONAM: SIGNIFICANT CHANGE UP
-  CEFAZOLIN: SIGNIFICANT CHANGE UP
-  CEFEPIME: SIGNIFICANT CHANGE UP
-  CEFOXITIN: SIGNIFICANT CHANGE UP
-  CEFTRIAXONE: SIGNIFICANT CHANGE UP
-  CIPROFLOXACIN: SIGNIFICANT CHANGE UP
-  ERTAPENEM: SIGNIFICANT CHANGE UP
-  GENTAMICIN: SIGNIFICANT CHANGE UP
-  IMIPENEM: SIGNIFICANT CHANGE UP
-  LEVOFLOXACIN: SIGNIFICANT CHANGE UP
-  MEROPENEM: SIGNIFICANT CHANGE UP
-  PIPERACILLIN/TAZOBACTAM: SIGNIFICANT CHANGE UP
-  TOBRAMYCIN: SIGNIFICANT CHANGE UP
-  TRIMETHOPRIM/SULFAMETHOXAZOLE: SIGNIFICANT CHANGE UP
CULTURE RESULTS: SIGNIFICANT CHANGE UP
METHOD TYPE: SIGNIFICANT CHANGE UP
ORGANISM # SPEC MICROSCOPIC CNT: SIGNIFICANT CHANGE UP
SPECIMEN SOURCE: SIGNIFICANT CHANGE UP

## 2022-06-19 LAB
CULTURE RESULTS: SIGNIFICANT CHANGE UP
CULTURE RESULTS: SIGNIFICANT CHANGE UP
SPECIMEN SOURCE: SIGNIFICANT CHANGE UP
SPECIMEN SOURCE: SIGNIFICANT CHANGE UP

## 2022-08-07 ENCOUNTER — NON-APPOINTMENT (OUTPATIENT)
Age: 27
End: 2022-08-07

## 2022-08-24 NOTE — ED ADULT TRIAGE NOTE - AS HEIGHT TYPE
"Physical Therapy Treatment    Patient Name:  Nenita Mcneal   MRN:  [id number]    Recommendations:     Discharge Recommendations:  home health PT, home health OT   Discharge Equipment Recommendations: none   Barriers to discharge: Decreased caregiver support    Assessment:     Nenita Mcneal is a 80 y.o. female admitted with a medical diagnosis of Incarcerated incisional hernia.  She presents with the following impairments/functional limitations:  weakness, impaired endurance, impaired self care skills, impaired functional mobility, gait instability, impaired balance, decreased coordination, decreased upper extremity function, decreased lower extremity function, decreased safety awareness, pain, decreased ROM, impaired coordination, impaired skin. Pt able to perform 2 ambulation training trials ~90 ft and ~60 ft with straight cane requiring close CGA. Demonstrates a slow and cautious angel. Would benefit from continued PT services to increase pt's independent functional mobility to level prior to admission.    Rehab Prognosis: Good; patient would benefit from acute skilled PT services to address these deficits and reach maximum level of function.    Recent Surgery: Procedure(s) (LRB):  REPAIR, HERNIA, INCISIONAL, INCARCERATED, RECURRENT (N/A)  LYSIS, ADHESIONS  ENTEROTOMY SMALL BOWEL 5 Days Post-Op    Plan:     During this hospitalization, patient to be seen 3 x/week to address the identified rehab impairments via gait training, therapeutic activities, therapeutic exercises and progress toward the following goals:    · Plan of Care Expires:  09/23/22    Subjective     Chief Complaint: None Expressed  Patient/Family Comments/goals: "Sure I will work with you".  Pain/Comfort:  · Pain Rating 1: 0/10  · Pain Rating Post-Intervention 1: 0/10      Objective:     Communicated with nurse prior to session.  Patient found sitting edge of bed with bed alarm, peripheral IV upon PT entry to room.     General Precautions: " Standard, fall   Orthopedic Precautions:N/A   Braces: N/A  Respiratory Status: Room air     Functional Mobility:  · Transfers:     · Sit to Stand:  stand by assistance and contact guard assistance with no AD  · Gait:  ~90 ft and ~60 ft with straight cane requiring close CGA.      AM-PAC 6 CLICK MOBILITY  Turning over in bed (including adjusting bedclothes, sheets and blankets)?: 3  Sitting down on and standing up from a chair with arms (e.g., wheelchair, bedside commode, etc.): 3  Moving from lying on back to sitting on the side of the bed?: 3  Moving to and from a bed to a chair (including a wheelchair)?: 3  Need to walk in hospital room?: 3  Climbing 3-5 steps with a railing?: 3  Basic Mobility Total Score: 18       Therapeutic Activities and Exercises:   Pt seated at EOB upon arrival. Performed 1 x 15 LAQ's BLE's. Post LAQ's pt stated her left hip was hurting thus declining further exercises. Able to perform 2 ambulation training trials ~90 ft and ~60 ft with straight cane requiring close CGA. Statically stood on outside [facility] for ~11/2-2 minutes with Sup. Pt left in B/S chair with waste basket placed in front for pt to rest BLE's on as she preferred this to reclining the B/S chair.    Patient left up in chair with all lines intact, call button in reach, chair alarm on,  nurse notified and  family friends present..    GOALS:   Multidisciplinary Problems     Physical Therapy Goals        Problem: Physical Therapy    Goal Priority Disciplines Outcome Goal Variances Interventions   Physical Therapy Goal     PT, PT/OT Ongoing, Progressing     Description: Goals to be met by: 22     Patient will increase functional independence with mobility by performin. Supine <> sit with supervision  2. Sit to stand transfer with Supervision  3. Bed to chair transfer with Supervision using Single-point Cane   4. Gait  x 150 feet with Supervision using Single-point Cane .   5. Lower extremity exercise program x 10 reps  per handout, with supervision                     Time Tracking:     PT Received On: 08/24/22  PT Start Time: 1118     PT Stop Time: 1154  PT Total Time (min): 36 min     Billable Minutes: Gait Training  20 and Therapeutic Activity  16    Treatment Type: Treatment  PT/PTA: PTA     PTA Visit Number: 1     08/24/2022   stated

## 2022-10-20 NOTE — PATIENT PROFILE ADULT - NSPROCHRONICPAIN_GEN_A_NUR
yes Simple Simulation Afterword Text Will Be Included With Simple Simulations (Indications............): .

## 2022-11-09 NOTE — ED ADULT NURSE NOTE - NSIMPLEMENTINTERV_GEN_ALL_ED
Statement Selected Implemented All Universal Safety Interventions:  [place] to call system. Call bell, personal items and telephone within reach. Instruct patient to call for assistance. Room bathroom lighting operational. Non-slip footwear when patient is off stretcher. Physically safe environment: no spills, clutter or unnecessary equipment. Stretcher in lowest position, wheels locked, appropriate side rails in place.

## 2023-02-15 NOTE — CONSULT NOTE ADULT - CONSULT REQUESTED DATE/TIME
Chief Complaint   Patient presents with    Knee Pain     C/O right knee and left knee ongoing past 1 month         1. \"Have you been to the ER, urgent care clinic since your last visit? Hospitalized since your last visit? \" Yes Urgent Care for sinus infection     2. \"Have you seen or consulted any other health care providers outside of the [address] [place] [address] since your last visit? \" No     3. For patients aged 39-70: Has the patient had a colonoscopy / FIT/ Cologuard? Yes - no Care Gap present      If the patient is female:    4. For patients aged 41-77: Has the patient had a mammogram within the past 2 years? Yes - no Care Gap present      5. For patients aged 21-65: Has the patient had a pap smear?  NA - based on age or sex 08-Sep-2020 16:46

## 2023-09-19 NOTE — ED ADULT TRIAGE NOTE - LOCATION:
Attempted to contact pt was unsuccessful  Pt is seeking pre-op form for eye surgery. Awaiting callback from pt.  Pt is not  on pt portal    Right arm;

## 2023-09-21 NOTE — BH CONSULTATION LIAISON ASSESSMENT NOTE - NSBHMSEAFFQUAL_PSY_A_CORE
Picato Pregnancy And Lactation Text: This medication is Pregnancy Category C. It is unknown if this medication is excreted in breast milk. Irritable/Anxious

## 2023-09-29 ENCOUNTER — EMERGENCY (EMERGENCY)
Facility: HOSPITAL | Age: 28
LOS: 1 days | Discharge: HOME | End: 2023-09-29
Attending: EMERGENCY MEDICINE
Payer: COMMERCIAL

## 2023-09-29 VITALS
DIASTOLIC BLOOD PRESSURE: 85 MMHG | RESPIRATION RATE: 16 BRPM | WEIGHT: 154.98 LBS | SYSTOLIC BLOOD PRESSURE: 135 MMHG | OXYGEN SATURATION: 100 % | HEART RATE: 80 BPM | TEMPERATURE: 98 F | HEIGHT: 62 IN

## 2023-09-29 VITALS
OXYGEN SATURATION: 100 % | DIASTOLIC BLOOD PRESSURE: 95 MMHG | HEART RATE: 66 BPM | TEMPERATURE: 99 F | RESPIRATION RATE: 16 BRPM | SYSTOLIC BLOOD PRESSURE: 126 MMHG

## 2023-09-29 DIAGNOSIS — Z98.890 OTHER SPECIFIED POSTPROCEDURAL STATES: Chronic | ICD-10-CM

## 2023-09-29 DIAGNOSIS — Z98.89 OTHER SPECIFIED POSTPROCEDURAL STATES: Chronic | ICD-10-CM

## 2023-09-29 LAB
ALBUMIN SERPL ELPH-MCNC: 4 G/DL — SIGNIFICANT CHANGE UP (ref 3.3–5)
ALP SERPL-CCNC: 63 U/L — SIGNIFICANT CHANGE UP (ref 40–120)
ALT FLD-CCNC: 19 U/L — SIGNIFICANT CHANGE UP (ref 10–45)
ANION GAP SERPL CALC-SCNC: 12 MMOL/L — SIGNIFICANT CHANGE UP (ref 5–17)
APPEARANCE UR: CLEAR — SIGNIFICANT CHANGE UP
AST SERPL-CCNC: 30 U/L — SIGNIFICANT CHANGE UP (ref 10–40)
BACTERIA # UR AUTO: NEGATIVE — SIGNIFICANT CHANGE UP
BASE EXCESS BLDV CALC-SCNC: 3.4 MMOL/L — HIGH (ref -2–3)
BASOPHILS # BLD AUTO: 0.03 K/UL — SIGNIFICANT CHANGE UP (ref 0–0.2)
BASOPHILS NFR BLD AUTO: 0.7 % — SIGNIFICANT CHANGE UP (ref 0–2)
BILIRUB SERPL-MCNC: 0.2 MG/DL — SIGNIFICANT CHANGE UP (ref 0.2–1.2)
BILIRUB UR-MCNC: NEGATIVE — SIGNIFICANT CHANGE UP
BUN SERPL-MCNC: 14 MG/DL — SIGNIFICANT CHANGE UP (ref 7–23)
CA-I SERPL-SCNC: 1.25 MMOL/L — SIGNIFICANT CHANGE UP (ref 1.15–1.33)
CALCIUM SERPL-MCNC: 9.1 MG/DL — SIGNIFICANT CHANGE UP (ref 8.4–10.5)
CHLORIDE BLDV-SCNC: 103 MMOL/L — SIGNIFICANT CHANGE UP (ref 96–108)
CHLORIDE SERPL-SCNC: 102 MMOL/L — SIGNIFICANT CHANGE UP (ref 96–108)
CO2 BLDV-SCNC: 31 MMOL/L — HIGH (ref 22–26)
CO2 SERPL-SCNC: 22 MMOL/L — SIGNIFICANT CHANGE UP (ref 22–31)
COLOR SPEC: SIGNIFICANT CHANGE UP
CREAT SERPL-MCNC: 0.52 MG/DL — SIGNIFICANT CHANGE UP (ref 0.5–1.3)
DIFF PNL FLD: ABNORMAL
EGFR: 131 ML/MIN/1.73M2 — SIGNIFICANT CHANGE UP
EOSINOPHIL # BLD AUTO: 0.2 K/UL — SIGNIFICANT CHANGE UP (ref 0–0.5)
EOSINOPHIL NFR BLD AUTO: 4.7 % — SIGNIFICANT CHANGE UP (ref 0–6)
EPI CELLS # UR: 10 /HPF — HIGH
GAS PNL BLDV: 134 MMOL/L — LOW (ref 136–145)
GAS PNL BLDV: SIGNIFICANT CHANGE UP
GAS PNL BLDV: SIGNIFICANT CHANGE UP
GLUCOSE BLDV-MCNC: 91 MG/DL — SIGNIFICANT CHANGE UP (ref 70–99)
GLUCOSE SERPL-MCNC: 81 MG/DL — SIGNIFICANT CHANGE UP (ref 70–99)
GLUCOSE UR QL: NEGATIVE — SIGNIFICANT CHANGE UP
HCG SERPL-ACNC: <2 MIU/ML — SIGNIFICANT CHANGE UP
HCO3 BLDV-SCNC: 30 MMOL/L — HIGH (ref 22–29)
HCT VFR BLD CALC: 35 % — SIGNIFICANT CHANGE UP (ref 34.5–45)
HCT VFR BLDA CALC: 33 % — LOW (ref 34.5–46.5)
HGB BLD CALC-MCNC: 10.9 G/DL — LOW (ref 11.7–16.1)
HGB BLD-MCNC: 11.4 G/DL — LOW (ref 11.5–15.5)
HYALINE CASTS # UR AUTO: 0 /LPF — SIGNIFICANT CHANGE UP (ref 0–2)
IMM GRANULOCYTES NFR BLD AUTO: 0.2 % — SIGNIFICANT CHANGE UP (ref 0–0.9)
KETONES UR-MCNC: NEGATIVE — SIGNIFICANT CHANGE UP
LACTATE BLDV-MCNC: 1 MMOL/L — SIGNIFICANT CHANGE UP (ref 0.5–2)
LEUKOCYTE ESTERASE UR-ACNC: NEGATIVE — SIGNIFICANT CHANGE UP
LIDOCAIN IGE QN: 38 U/L — SIGNIFICANT CHANGE UP (ref 7–60)
LYMPHOCYTES # BLD AUTO: 1.51 K/UL — SIGNIFICANT CHANGE UP (ref 1–3.3)
LYMPHOCYTES # BLD AUTO: 35.4 % — SIGNIFICANT CHANGE UP (ref 13–44)
MCHC RBC-ENTMCNC: 28.6 PG — SIGNIFICANT CHANGE UP (ref 27–34)
MCHC RBC-ENTMCNC: 32.6 GM/DL — SIGNIFICANT CHANGE UP (ref 32–36)
MCV RBC AUTO: 87.7 FL — SIGNIFICANT CHANGE UP (ref 80–100)
MONOCYTES # BLD AUTO: 0.37 K/UL — SIGNIFICANT CHANGE UP (ref 0–0.9)
MONOCYTES NFR BLD AUTO: 8.7 % — SIGNIFICANT CHANGE UP (ref 2–14)
NEUTROPHILS # BLD AUTO: 2.15 K/UL — SIGNIFICANT CHANGE UP (ref 1.8–7.4)
NEUTROPHILS NFR BLD AUTO: 50.3 % — SIGNIFICANT CHANGE UP (ref 43–77)
NITRITE UR-MCNC: NEGATIVE — SIGNIFICANT CHANGE UP
NRBC # BLD: 0 /100 WBCS — SIGNIFICANT CHANGE UP (ref 0–0)
PCO2 BLDV: 51 MMHG — HIGH (ref 39–42)
PH BLDV: 7.37 — SIGNIFICANT CHANGE UP (ref 7.32–7.43)
PH UR: 6.5 — SIGNIFICANT CHANGE UP (ref 5–8)
PLATELET # BLD AUTO: 273 K/UL — SIGNIFICANT CHANGE UP (ref 150–400)
PO2 BLDV: 49 MMHG — HIGH (ref 25–45)
POTASSIUM BLDV-SCNC: 4.1 MMOL/L — SIGNIFICANT CHANGE UP (ref 3.5–5.1)
POTASSIUM SERPL-MCNC: 4.4 MMOL/L — SIGNIFICANT CHANGE UP (ref 3.5–5.3)
POTASSIUM SERPL-SCNC: 4.4 MMOL/L — SIGNIFICANT CHANGE UP (ref 3.5–5.3)
PROT SERPL-MCNC: 6.7 G/DL — SIGNIFICANT CHANGE UP (ref 6–8.3)
PROT UR-MCNC: NEGATIVE — SIGNIFICANT CHANGE UP
RBC # BLD: 3.99 M/UL — SIGNIFICANT CHANGE UP (ref 3.8–5.2)
RBC # FLD: 13.2 % — SIGNIFICANT CHANGE UP (ref 10.3–14.5)
RBC CASTS # UR COMP ASSIST: 6 /HPF — HIGH (ref 0–4)
SAO2 % BLDV: 79.4 % — SIGNIFICANT CHANGE UP (ref 67–88)
SODIUM SERPL-SCNC: 136 MMOL/L — SIGNIFICANT CHANGE UP (ref 135–145)
SP GR SPEC: 1.04 — HIGH (ref 1.01–1.02)
TROPONIN T, HIGH SENSITIVITY RESULT: <6 NG/L — SIGNIFICANT CHANGE UP (ref 0–51)
UROBILINOGEN FLD QL: NEGATIVE — SIGNIFICANT CHANGE UP
WBC # BLD: 4.27 K/UL — SIGNIFICANT CHANGE UP (ref 3.8–10.5)
WBC # FLD AUTO: 4.27 K/UL — SIGNIFICANT CHANGE UP (ref 3.8–10.5)
WBC UR QL: 5 /HPF — SIGNIFICANT CHANGE UP (ref 0–5)

## 2023-09-29 PROCEDURE — 84132 ASSAY OF SERUM POTASSIUM: CPT

## 2023-09-29 PROCEDURE — 72132 CT LUMBAR SPINE W/DYE: CPT | Mod: 26,MA

## 2023-09-29 PROCEDURE — 72129 CT CHEST SPINE W/DYE: CPT | Mod: 26,MA

## 2023-09-29 PROCEDURE — 82947 ASSAY GLUCOSE BLOOD QUANT: CPT

## 2023-09-29 PROCEDURE — 71260 CT THORAX DX C+: CPT | Mod: MA

## 2023-09-29 PROCEDURE — 82803 BLOOD GASES ANY COMBINATION: CPT

## 2023-09-29 PROCEDURE — 85025 COMPLETE CBC W/AUTO DIFF WBC: CPT

## 2023-09-29 PROCEDURE — 86789 WEST NILE VIRUS ANTIBODY: CPT

## 2023-09-29 PROCEDURE — 82330 ASSAY OF CALCIUM: CPT

## 2023-09-29 PROCEDURE — 86788 WEST NILE VIRUS AB IGM: CPT

## 2023-09-29 PROCEDURE — 85014 HEMATOCRIT: CPT

## 2023-09-29 PROCEDURE — 81001 URINALYSIS AUTO W/SCOPE: CPT

## 2023-09-29 PROCEDURE — 71045 X-RAY EXAM CHEST 1 VIEW: CPT

## 2023-09-29 PROCEDURE — 83605 ASSAY OF LACTIC ACID: CPT

## 2023-09-29 PROCEDURE — 74177 CT ABD & PELVIS W/CONTRAST: CPT | Mod: MA

## 2023-09-29 PROCEDURE — 93005 ELECTROCARDIOGRAM TRACING: CPT

## 2023-09-29 PROCEDURE — 71260 CT THORAX DX C+: CPT | Mod: 26,MA

## 2023-09-29 PROCEDURE — 87798 DETECT AGENT NOS DNA AMP: CPT

## 2023-09-29 PROCEDURE — 70450 CT HEAD/BRAIN W/O DYE: CPT | Mod: MA

## 2023-09-29 PROCEDURE — 74177 CT ABD & PELVIS W/CONTRAST: CPT | Mod: 26,MA

## 2023-09-29 PROCEDURE — 84484 ASSAY OF TROPONIN QUANT: CPT

## 2023-09-29 PROCEDURE — 84702 CHORIONIC GONADOTROPIN TEST: CPT

## 2023-09-29 PROCEDURE — 71045 X-RAY EXAM CHEST 1 VIEW: CPT | Mod: 26

## 2023-09-29 PROCEDURE — 85018 HEMOGLOBIN: CPT

## 2023-09-29 PROCEDURE — 84295 ASSAY OF SERUM SODIUM: CPT

## 2023-09-29 PROCEDURE — 70450 CT HEAD/BRAIN W/O DYE: CPT | Mod: 26,MA

## 2023-09-29 PROCEDURE — 80053 COMPREHEN METABOLIC PANEL: CPT

## 2023-09-29 PROCEDURE — 99285 EMERGENCY DEPT VISIT HI MDM: CPT

## 2023-09-29 PROCEDURE — 82435 ASSAY OF BLOOD CHLORIDE: CPT

## 2023-09-29 PROCEDURE — 83690 ASSAY OF LIPASE: CPT

## 2023-09-29 PROCEDURE — 99285 EMERGENCY DEPT VISIT HI MDM: CPT | Mod: 25

## 2023-09-29 RX ORDER — SODIUM CHLORIDE 9 MG/ML
1000 INJECTION INTRAMUSCULAR; INTRAVENOUS; SUBCUTANEOUS ONCE
Refills: 0 | Status: COMPLETED | OUTPATIENT
Start: 2023-09-29 | End: 2023-09-29

## 2023-09-29 RX ADMIN — SODIUM CHLORIDE 1000 MILLILITER(S): 9 INJECTION INTRAMUSCULAR; INTRAVENOUS; SUBCUTANEOUS at 08:34

## 2023-09-29 NOTE — ED ADULT NURSE NOTE - NSICDXPASTSURGICALHX_GEN_ALL_CORE_FT
PAST SURGICAL HISTORY:  History of abdominoplasty     History of tonsillectomy      Initiate Treatment: Doxy 100mg twice daily X 1 month Render In Strict Bullet Format?: No Detail Level: Zone Initiate Treatment: clindamycin phosphate 1 % topical swab Daily\\nQuantity: 60.0 Swab  Days Supply: 30\\nSig: Apply to affected area of face qday\\n\\ntretinoin 0.05 % topical cream \\nQuantity: 45.0 g  Days Supply: 30\\nSig: Apply a pea sized amount to face every other night, once tolerated apply a pea sized amount to face every night

## 2023-09-29 NOTE — ED PROVIDER NOTE - OBJECTIVE STATEMENT
(1) Drift
Female history of Erler's Danlos, endometriosis, fibromyalgia, gastroesophageal reflux, recent  complicated by abscess and sepsis presenting with 4 to 5 days of lower abdominal pain generalized joint pain chills low-grade fever, exacerbation of her chronic lower back pain.  Patient also noting changes to her peripheral vision–notes looks "wavy" but is able to see.   Patient also mentioning intermittent yellowing of the eyes and tongue during this time and is concerned liver problems.  Patient is followed by pain management for her fibromyalgia and endometriosis takes Dilaudid standing.

## 2023-09-29 NOTE — ED ADULT TRIAGE NOTE - CHIEF COMPLAINT QUOTE
palpitations for 6 hours  mid back pain to lower back pain for one week  generalized body spasms since last night  abdominal pain for 4 days  s/p  2 months ago

## 2023-09-29 NOTE — ED PROVIDER NOTE - PATIENT PORTAL LINK FT
You can access the FollowMyHealth Patient Portal offered by  by registering at the following website: [email or web address]. By joining [employer]’s FollowMyHealth portal, you will also be able to view your health information using other applications (apps) compatible with our system.

## 2023-09-29 NOTE — ED PROVIDER NOTE - PHYSICAL EXAMINATION
PHYSICAL EXAM:  CONSTITUTIONAL: Well appearing, awake, alert, oriented to person, place, time/situation and in no apparent distress.  HEAD: Atraumatic  EYES: anicteric   ENMT: Airway patent, Nasal mucosa clear. Mouth with normal mucosa. Uvula is midline.   CARDIAC: Normal rate, regular rhythm. +S1/S2. No murmurs, rubs or gallops.  RESPIRATORY: Breathing unlabored. Breath sounds clear and equal bilaterally.  ABDOMEN:  diffuse mild tenderness without rebound or guarding.   LE: Bilateral inguinal lymphadenopathy   NEUROLOGICAL: Alert and oriented, no focal deficits, no motor or sensory deficits. CN2-12 intact. Sensation intact x4 extremities.

## 2023-09-29 NOTE — ED ADULT NURSE NOTE - OBJECTIVE STATEMENT
27 yo presents to the ED from home. A&Ox4, ambulatory, history of Erler's Danlos, endometriosis, fibromyalgia, gastroesophageal reflux, recent  complicated by abscess and sepsis c/o 4-5 days of lower abdominal pain generalized joint pain chills low-grade fever, exacerbation of her chronic lower back pain. Patient also noting changes to her peripheral vision–notes looks "wavy" but is able to see. Patient also states intermittent yellowing of the eyes and tongue during this time and is concerned liver problems. Patient is followed by pain management for her fibromyalgia and endometriosis takes Dilaudid standing. 20G inserted R forearm. Patient undressed and placed into gown, call bell in hand and side rails up for safety. warm blanket provided, vital signs stable, pt in no acute distress. EKG done in triage. pt placed on CM upon arrival to 36R.

## 2023-09-29 NOTE — ED PROVIDER NOTE - NSFOLLOWUPINSTRUCTIONS_ED_ALL_ED_FT
you are seen in the emergency department with concerns related to abdominal infection liver function changes in vision back pain and pelvic pain.  You had imaging of your head chest abdomen pelvis thoracic and lumbar spine which showed no acute abnormalities.  You had blood work which evaluated the level of bilirubin your blood which was normal, your white blood  cell count was also normal which indicates the absence of an infection.  Your electrolytes were within normal limits as well.  You should pursue the MRI of your spine as previously discussed    Please return to the emergency department if your symptoms persist or worsen    Follow-up with your primary care physician.

## 2023-09-29 NOTE — ED ADULT NURSE NOTE - NS ED NURSE LEVEL OF CONSCIOUSNESS ORIENTATION
Mr  Mickey Kim called, he is scheduled to have procedures on 3 or 4 toes  He would like to know how many  Also, he wants to know if he can drive after the procedure as he is the only  in his family  Will there be any pins put in his toes? Will he be non weight bearing? Does he need to let his Cardiologist know about the procedure  Lastly, this procedure is scheduled for 10/5/22 and there is only 15 minutes set aside for it  Oriented - self; Oriented - place; Oriented - time

## 2023-09-29 NOTE — ED ADULT NURSE NOTE - NSFALLUNIVINTERV_ED_ALL_ED
Bed/Stretcher in lowest position, wheels locked, appropriate side rails in place/Call bell, personal items and telephone in reach/Instruct patient to call for assistance before getting out of bed/chair/stretcher/Non-slip footwear applied when patient is off stretcher/[place] to call system/Physically safe environment - no spills, clutter or unnecessary equipment/Purposeful proactive rounding/Room/bathroom lighting operational, light cord in reach

## 2023-09-29 NOTE — ED PROVIDER NOTE - ATTENDING CONTRIBUTION TO CARE
Private Physician  30y f pmh Marysol-Danlos, stage 4 endometriosis, fibromyalgia, GerdPSH abdominoplasty c/b infection/dehisence. Now comes to ed c/o Private Physician Felipe Grey/pain management [place], Radha Oneal [employer] [facility]  30y f pmh Marysol-Danlos, stage 4 endometriosis, fibromyalgia, GerdPSH abdominoplasty c/b infection/dehisence. Now comes to ed c/o chronic pains back, and "joint pains to all joints' Pt was planning to have MRI today. Pt states pain worsened and came to ed. No fevers chills. nausea w/o vomiting. Not pregnant. Pt was last admitted to [facility] approx 2 w ago for infection. Had perc drainage. Pt sp c-sec two mo ago. C/B anemia. Preeclampsia post partum. PE WDWN female awake alert NCAT neck supple chest clear anterior & posterior Private Physician Felipe Grey/pain management [place], Radha Oneal [employer] [facility]  30y f pmh Marysol-Danlos, stage 4 endometriosis, fibromyalgia, GerdPSH abdominoplasty c/b infection/dehisence. Now comes to ed c/o chronic pains back, and "joint pains to all joints' Pt was planning to have MRI today. Pt states pain worsened and came to ed. No fevers chills. nausea w/o vomiting. Not pregnant. Pt was last admitted to [facility] approx 2 w ago for infection. Had perc drainage. Pt sp c-sec two mo ago. C/B anemia. Preeclampsia post partum. PE WDWN female awake alert NCAT neck supple chest clear anterior & posterior cv no rubs, gallops or murmurs abd mild gen ttp no rebound guarding masses. neuro gcs 15 speech fluent power 5/5 all ext msk min ttp upper lumbar spine. Neuro gcs 15 speech fluent power 5/5 all ext sensation intact awake alert  Cristian Hatfield MD, Facep

## 2023-09-29 NOTE — ED PROVIDER NOTE - CLINICAL SUMMARY MEDICAL DECISION MAKING FREE TEXT BOX
Pt w complictaed med hx pw multiple chronic complaints back pain, joint pains followed by pain management as well as abd pain w recent admit for intraperitoneal abscess. Pt also concerned for WNV "there has been a lot in my neighborhood and I had a rash recently w tactile fever (both resolved) will send WNV labs, Check ct, trop/ekg, ivf and reassess  Cristian Hatfield MD, Facep

## 2023-10-02 LAB
WNV IGG TITR FLD: NEGATIVE — SIGNIFICANT CHANGE UP
WNV IGM SPEC QL: NEGATIVE — SIGNIFICANT CHANGE UP

## 2023-10-03 LAB
WNV RNA SPEC QL NAA+PROBE: SIGNIFICANT CHANGE UP
WNV RNA SPEC QL NAA+PROBE: SIGNIFICANT CHANGE UP

## 2023-10-15 NOTE — PATIENT PROFILE ADULT - NSPROMUTANXFEARFT_GEN_A_NUR
Please follow-up with your obstetrician as scheduled.  Return to the emergency department if you develop a fever chills, nausea or vomiting.  If you are bleeding through 1 pad in an hour.  
NA

## 2023-10-21 ENCOUNTER — EMERGENCY (EMERGENCY)
Facility: HOSPITAL | Age: 28
LOS: 1 days | Discharge: HOME | End: 2023-10-21
Attending: EMERGENCY MEDICINE | Admitting: EMERGENCY MEDICINE
Payer: COMMERCIAL

## 2023-10-21 VITALS
DIASTOLIC BLOOD PRESSURE: 71 MMHG | OXYGEN SATURATION: 100 % | RESPIRATION RATE: 15 BRPM | TEMPERATURE: 100 F | SYSTOLIC BLOOD PRESSURE: 119 MMHG | HEART RATE: 82 BPM | HEIGHT: 62 IN

## 2023-10-21 VITALS
HEART RATE: 78 BPM | OXYGEN SATURATION: 100 % | SYSTOLIC BLOOD PRESSURE: 123 MMHG | TEMPERATURE: 98 F | DIASTOLIC BLOOD PRESSURE: 82 MMHG | RESPIRATION RATE: 16 BRPM

## 2023-10-21 DIAGNOSIS — Z98.890 OTHER SPECIFIED POSTPROCEDURAL STATES: Chronic | ICD-10-CM

## 2023-10-21 DIAGNOSIS — Z98.89 OTHER SPECIFIED POSTPROCEDURAL STATES: Chronic | ICD-10-CM

## 2023-10-21 LAB
ALBUMIN SERPL ELPH-MCNC: 4.4 G/DL — SIGNIFICANT CHANGE UP (ref 3.3–5)
ALBUMIN SERPL ELPH-MCNC: 4.4 G/DL — SIGNIFICANT CHANGE UP (ref 3.3–5)
ALP SERPL-CCNC: 65 U/L — SIGNIFICANT CHANGE UP (ref 40–120)
ALP SERPL-CCNC: 65 U/L — SIGNIFICANT CHANGE UP (ref 40–120)
ALT FLD-CCNC: 12 U/L — SIGNIFICANT CHANGE UP (ref 4–33)
ALT FLD-CCNC: 12 U/L — SIGNIFICANT CHANGE UP (ref 4–33)
ANION GAP SERPL CALC-SCNC: 13 MMOL/L — SIGNIFICANT CHANGE UP (ref 7–14)
ANION GAP SERPL CALC-SCNC: 13 MMOL/L — SIGNIFICANT CHANGE UP (ref 7–14)
APPEARANCE UR: CLEAR — SIGNIFICANT CHANGE UP
APPEARANCE UR: CLEAR — SIGNIFICANT CHANGE UP
APTT BLD: 41 SEC — HIGH (ref 24.5–35.6)
APTT BLD: 41 SEC — HIGH (ref 24.5–35.6)
AST SERPL-CCNC: 17 U/L — SIGNIFICANT CHANGE UP (ref 4–32)
AST SERPL-CCNC: 17 U/L — SIGNIFICANT CHANGE UP (ref 4–32)
B PERT DNA SPEC QL NAA+PROBE: SIGNIFICANT CHANGE UP
B PERT DNA SPEC QL NAA+PROBE: SIGNIFICANT CHANGE UP
B PERT+PARAPERT DNA PNL SPEC NAA+PROBE: SIGNIFICANT CHANGE UP
B PERT+PARAPERT DNA PNL SPEC NAA+PROBE: SIGNIFICANT CHANGE UP
BASOPHILS # BLD AUTO: 0.03 K/UL — SIGNIFICANT CHANGE UP (ref 0–0.2)
BASOPHILS # BLD AUTO: 0.03 K/UL — SIGNIFICANT CHANGE UP (ref 0–0.2)
BASOPHILS NFR BLD AUTO: 0.6 % — SIGNIFICANT CHANGE UP (ref 0–2)
BASOPHILS NFR BLD AUTO: 0.6 % — SIGNIFICANT CHANGE UP (ref 0–2)
BILIRUB SERPL-MCNC: 0.2 MG/DL — SIGNIFICANT CHANGE UP (ref 0.2–1.2)
BILIRUB SERPL-MCNC: 0.2 MG/DL — SIGNIFICANT CHANGE UP (ref 0.2–1.2)
BILIRUB UR-MCNC: NEGATIVE — SIGNIFICANT CHANGE UP
BILIRUB UR-MCNC: NEGATIVE — SIGNIFICANT CHANGE UP
BORDETELLA PARAPERTUSSIS (RAPRVP): SIGNIFICANT CHANGE UP
BORDETELLA PARAPERTUSSIS (RAPRVP): SIGNIFICANT CHANGE UP
BUN SERPL-MCNC: 17 MG/DL — SIGNIFICANT CHANGE UP (ref 7–23)
BUN SERPL-MCNC: 17 MG/DL — SIGNIFICANT CHANGE UP (ref 7–23)
C PNEUM DNA SPEC QL NAA+PROBE: SIGNIFICANT CHANGE UP
C PNEUM DNA SPEC QL NAA+PROBE: SIGNIFICANT CHANGE UP
C TRACH RRNA SPEC QL NAA+PROBE: SIGNIFICANT CHANGE UP
C TRACH RRNA SPEC QL NAA+PROBE: SIGNIFICANT CHANGE UP
CALCIUM SERPL-MCNC: 8.9 MG/DL — SIGNIFICANT CHANGE UP (ref 8.4–10.5)
CALCIUM SERPL-MCNC: 8.9 MG/DL — SIGNIFICANT CHANGE UP (ref 8.4–10.5)
CHLORIDE SERPL-SCNC: 102 MMOL/L — SIGNIFICANT CHANGE UP (ref 98–107)
CHLORIDE SERPL-SCNC: 102 MMOL/L — SIGNIFICANT CHANGE UP (ref 98–107)
CK SERPL-CCNC: 57 U/L — SIGNIFICANT CHANGE UP (ref 25–170)
CK SERPL-CCNC: 57 U/L — SIGNIFICANT CHANGE UP (ref 25–170)
CO2 SERPL-SCNC: 21 MMOL/L — LOW (ref 22–31)
CO2 SERPL-SCNC: 21 MMOL/L — LOW (ref 22–31)
COLOR SPEC: YELLOW — SIGNIFICANT CHANGE UP
COLOR SPEC: YELLOW — SIGNIFICANT CHANGE UP
CREAT SERPL-MCNC: 0.55 MG/DL — SIGNIFICANT CHANGE UP (ref 0.5–1.3)
CREAT SERPL-MCNC: 0.55 MG/DL — SIGNIFICANT CHANGE UP (ref 0.5–1.3)
DIFF PNL FLD: NEGATIVE — SIGNIFICANT CHANGE UP
DIFF PNL FLD: NEGATIVE — SIGNIFICANT CHANGE UP
EGFR: 129 ML/MIN/1.73M2 — SIGNIFICANT CHANGE UP
EGFR: 129 ML/MIN/1.73M2 — SIGNIFICANT CHANGE UP
EOSINOPHIL # BLD AUTO: 0.11 K/UL — SIGNIFICANT CHANGE UP (ref 0–0.5)
EOSINOPHIL # BLD AUTO: 0.11 K/UL — SIGNIFICANT CHANGE UP (ref 0–0.5)
EOSINOPHIL NFR BLD AUTO: 2.2 % — SIGNIFICANT CHANGE UP (ref 0–6)
EOSINOPHIL NFR BLD AUTO: 2.2 % — SIGNIFICANT CHANGE UP (ref 0–6)
FLUAV SUBTYP SPEC NAA+PROBE: SIGNIFICANT CHANGE UP
FLUAV SUBTYP SPEC NAA+PROBE: SIGNIFICANT CHANGE UP
FLUBV RNA SPEC QL NAA+PROBE: SIGNIFICANT CHANGE UP
FLUBV RNA SPEC QL NAA+PROBE: SIGNIFICANT CHANGE UP
GLUCOSE SERPL-MCNC: 88 MG/DL — SIGNIFICANT CHANGE UP (ref 70–99)
GLUCOSE SERPL-MCNC: 88 MG/DL — SIGNIFICANT CHANGE UP (ref 70–99)
GLUCOSE UR QL: NEGATIVE MG/DL — SIGNIFICANT CHANGE UP
GLUCOSE UR QL: NEGATIVE MG/DL — SIGNIFICANT CHANGE UP
HADV DNA SPEC QL NAA+PROBE: SIGNIFICANT CHANGE UP
HADV DNA SPEC QL NAA+PROBE: SIGNIFICANT CHANGE UP
HCG SERPL-ACNC: <1 MIU/ML — SIGNIFICANT CHANGE UP
HCG SERPL-ACNC: <1 MIU/ML — SIGNIFICANT CHANGE UP
HCOV 229E RNA SPEC QL NAA+PROBE: SIGNIFICANT CHANGE UP
HCOV 229E RNA SPEC QL NAA+PROBE: SIGNIFICANT CHANGE UP
HCOV HKU1 RNA SPEC QL NAA+PROBE: SIGNIFICANT CHANGE UP
HCOV HKU1 RNA SPEC QL NAA+PROBE: SIGNIFICANT CHANGE UP
HCOV NL63 RNA SPEC QL NAA+PROBE: SIGNIFICANT CHANGE UP
HCOV NL63 RNA SPEC QL NAA+PROBE: SIGNIFICANT CHANGE UP
HCOV OC43 RNA SPEC QL NAA+PROBE: SIGNIFICANT CHANGE UP
HCOV OC43 RNA SPEC QL NAA+PROBE: SIGNIFICANT CHANGE UP
HCT VFR BLD CALC: 35.9 % — SIGNIFICANT CHANGE UP (ref 34.5–45)
HCT VFR BLD CALC: 35.9 % — SIGNIFICANT CHANGE UP (ref 34.5–45)
HETEROPH AB TITR SER AGGL: POSITIVE
HETEROPH AB TITR SER AGGL: POSITIVE
HGB BLD-MCNC: 12.3 G/DL — SIGNIFICANT CHANGE UP (ref 11.5–15.5)
HGB BLD-MCNC: 12.3 G/DL — SIGNIFICANT CHANGE UP (ref 11.5–15.5)
HMPV RNA SPEC QL NAA+PROBE: SIGNIFICANT CHANGE UP
HMPV RNA SPEC QL NAA+PROBE: SIGNIFICANT CHANGE UP
HPIV1 RNA SPEC QL NAA+PROBE: SIGNIFICANT CHANGE UP
HPIV1 RNA SPEC QL NAA+PROBE: SIGNIFICANT CHANGE UP
HPIV2 RNA SPEC QL NAA+PROBE: SIGNIFICANT CHANGE UP
HPIV2 RNA SPEC QL NAA+PROBE: SIGNIFICANT CHANGE UP
HPIV3 RNA SPEC QL NAA+PROBE: SIGNIFICANT CHANGE UP
HPIV3 RNA SPEC QL NAA+PROBE: SIGNIFICANT CHANGE UP
HPIV4 RNA SPEC QL NAA+PROBE: SIGNIFICANT CHANGE UP
HPIV4 RNA SPEC QL NAA+PROBE: SIGNIFICANT CHANGE UP
IANC: 2.59 K/UL — SIGNIFICANT CHANGE UP (ref 1.8–7.4)
IANC: 2.59 K/UL — SIGNIFICANT CHANGE UP (ref 1.8–7.4)
IMM GRANULOCYTES NFR BLD AUTO: 0.4 % — SIGNIFICANT CHANGE UP (ref 0–0.9)
IMM GRANULOCYTES NFR BLD AUTO: 0.4 % — SIGNIFICANT CHANGE UP (ref 0–0.9)
INR BLD: 1.09 RATIO — SIGNIFICANT CHANGE UP (ref 0.85–1.18)
INR BLD: 1.09 RATIO — SIGNIFICANT CHANGE UP (ref 0.85–1.18)
KETONES UR-MCNC: NEGATIVE MG/DL — SIGNIFICANT CHANGE UP
KETONES UR-MCNC: NEGATIVE MG/DL — SIGNIFICANT CHANGE UP
LEUKOCYTE ESTERASE UR-ACNC: NEGATIVE — SIGNIFICANT CHANGE UP
LEUKOCYTE ESTERASE UR-ACNC: NEGATIVE — SIGNIFICANT CHANGE UP
LYMPHOCYTES # BLD AUTO: 1.91 K/UL — SIGNIFICANT CHANGE UP (ref 1–3.3)
LYMPHOCYTES # BLD AUTO: 1.91 K/UL — SIGNIFICANT CHANGE UP (ref 1–3.3)
LYMPHOCYTES # BLD AUTO: 38 % — SIGNIFICANT CHANGE UP (ref 13–44)
LYMPHOCYTES # BLD AUTO: 38 % — SIGNIFICANT CHANGE UP (ref 13–44)
M PNEUMO DNA SPEC QL NAA+PROBE: SIGNIFICANT CHANGE UP
M PNEUMO DNA SPEC QL NAA+PROBE: SIGNIFICANT CHANGE UP
MAGNESIUM SERPL-MCNC: 2 MG/DL — SIGNIFICANT CHANGE UP (ref 1.6–2.6)
MAGNESIUM SERPL-MCNC: 2 MG/DL — SIGNIFICANT CHANGE UP (ref 1.6–2.6)
MCHC RBC-ENTMCNC: 28.3 PG — SIGNIFICANT CHANGE UP (ref 27–34)
MCHC RBC-ENTMCNC: 28.3 PG — SIGNIFICANT CHANGE UP (ref 27–34)
MCHC RBC-ENTMCNC: 34.3 GM/DL — SIGNIFICANT CHANGE UP (ref 32–36)
MCHC RBC-ENTMCNC: 34.3 GM/DL — SIGNIFICANT CHANGE UP (ref 32–36)
MCV RBC AUTO: 82.7 FL — SIGNIFICANT CHANGE UP (ref 80–100)
MCV RBC AUTO: 82.7 FL — SIGNIFICANT CHANGE UP (ref 80–100)
MONOCYTES # BLD AUTO: 0.37 K/UL — SIGNIFICANT CHANGE UP (ref 0–0.9)
MONOCYTES # BLD AUTO: 0.37 K/UL — SIGNIFICANT CHANGE UP (ref 0–0.9)
MONOCYTES NFR BLD AUTO: 7.4 % — SIGNIFICANT CHANGE UP (ref 2–14)
MONOCYTES NFR BLD AUTO: 7.4 % — SIGNIFICANT CHANGE UP (ref 2–14)
N GONORRHOEA RRNA SPEC QL NAA+PROBE: SIGNIFICANT CHANGE UP
N GONORRHOEA RRNA SPEC QL NAA+PROBE: SIGNIFICANT CHANGE UP
NEUTROPHILS # BLD AUTO: 2.59 K/UL — SIGNIFICANT CHANGE UP (ref 1.8–7.4)
NEUTROPHILS # BLD AUTO: 2.59 K/UL — SIGNIFICANT CHANGE UP (ref 1.8–7.4)
NEUTROPHILS NFR BLD AUTO: 51.4 % — SIGNIFICANT CHANGE UP (ref 43–77)
NEUTROPHILS NFR BLD AUTO: 51.4 % — SIGNIFICANT CHANGE UP (ref 43–77)
NITRITE UR-MCNC: NEGATIVE — SIGNIFICANT CHANGE UP
NITRITE UR-MCNC: NEGATIVE — SIGNIFICANT CHANGE UP
NRBC # BLD: 0 /100 WBCS — SIGNIFICANT CHANGE UP (ref 0–0)
NRBC # BLD: 0 /100 WBCS — SIGNIFICANT CHANGE UP (ref 0–0)
NRBC # FLD: 0 K/UL — SIGNIFICANT CHANGE UP (ref 0–0)
NRBC # FLD: 0 K/UL — SIGNIFICANT CHANGE UP (ref 0–0)
PH UR: 6 — SIGNIFICANT CHANGE UP (ref 5–8)
PH UR: 6 — SIGNIFICANT CHANGE UP (ref 5–8)
PHOSPHATE SERPL-MCNC: 4.1 MG/DL — SIGNIFICANT CHANGE UP (ref 2.5–4.5)
PHOSPHATE SERPL-MCNC: 4.1 MG/DL — SIGNIFICANT CHANGE UP (ref 2.5–4.5)
PLATELET # BLD AUTO: 325 K/UL — SIGNIFICANT CHANGE UP (ref 150–400)
PLATELET # BLD AUTO: 325 K/UL — SIGNIFICANT CHANGE UP (ref 150–400)
POTASSIUM SERPL-MCNC: 4.2 MMOL/L — SIGNIFICANT CHANGE UP (ref 3.5–5.3)
POTASSIUM SERPL-MCNC: 4.2 MMOL/L — SIGNIFICANT CHANGE UP (ref 3.5–5.3)
POTASSIUM SERPL-SCNC: 4.2 MMOL/L — SIGNIFICANT CHANGE UP (ref 3.5–5.3)
POTASSIUM SERPL-SCNC: 4.2 MMOL/L — SIGNIFICANT CHANGE UP (ref 3.5–5.3)
PROT SERPL-MCNC: 7.4 G/DL — SIGNIFICANT CHANGE UP (ref 6–8.3)
PROT SERPL-MCNC: 7.4 G/DL — SIGNIFICANT CHANGE UP (ref 6–8.3)
PROT UR-MCNC: SIGNIFICANT CHANGE UP MG/DL
PROT UR-MCNC: SIGNIFICANT CHANGE UP MG/DL
PROTHROM AB SERPL-ACNC: 12.3 SEC — SIGNIFICANT CHANGE UP (ref 9.5–13)
PROTHROM AB SERPL-ACNC: 12.3 SEC — SIGNIFICANT CHANGE UP (ref 9.5–13)
RAPID RVP RESULT: SIGNIFICANT CHANGE UP
RAPID RVP RESULT: SIGNIFICANT CHANGE UP
RBC # BLD: 4.34 M/UL — SIGNIFICANT CHANGE UP (ref 3.8–5.2)
RBC # BLD: 4.34 M/UL — SIGNIFICANT CHANGE UP (ref 3.8–5.2)
RBC # FLD: 13.2 % — SIGNIFICANT CHANGE UP (ref 10.3–14.5)
RBC # FLD: 13.2 % — SIGNIFICANT CHANGE UP (ref 10.3–14.5)
RSV RNA SPEC QL NAA+PROBE: SIGNIFICANT CHANGE UP
RSV RNA SPEC QL NAA+PROBE: SIGNIFICANT CHANGE UP
RV+EV RNA SPEC QL NAA+PROBE: SIGNIFICANT CHANGE UP
RV+EV RNA SPEC QL NAA+PROBE: SIGNIFICANT CHANGE UP
SARS-COV-2 RNA SPEC QL NAA+PROBE: SIGNIFICANT CHANGE UP
SARS-COV-2 RNA SPEC QL NAA+PROBE: SIGNIFICANT CHANGE UP
SODIUM SERPL-SCNC: 136 MMOL/L — SIGNIFICANT CHANGE UP (ref 135–145)
SODIUM SERPL-SCNC: 136 MMOL/L — SIGNIFICANT CHANGE UP (ref 135–145)
SP GR SPEC: 1.03 — HIGH (ref 1–1.03)
SP GR SPEC: 1.03 — HIGH (ref 1–1.03)
SPECIMEN SOURCE: SIGNIFICANT CHANGE UP
SPECIMEN SOURCE: SIGNIFICANT CHANGE UP
T PALLIDUM AB TITR SER: NEGATIVE — SIGNIFICANT CHANGE UP
T PALLIDUM AB TITR SER: NEGATIVE — SIGNIFICANT CHANGE UP
TSH SERPL-MCNC: 2.79 UIU/ML — SIGNIFICANT CHANGE UP (ref 0.27–4.2)
TSH SERPL-MCNC: 2.79 UIU/ML — SIGNIFICANT CHANGE UP (ref 0.27–4.2)
UROBILINOGEN FLD QL: 1 MG/DL — SIGNIFICANT CHANGE UP (ref 0.2–1)
UROBILINOGEN FLD QL: 1 MG/DL — SIGNIFICANT CHANGE UP (ref 0.2–1)
WBC # BLD: 5.03 K/UL — SIGNIFICANT CHANGE UP (ref 3.8–10.5)
WBC # BLD: 5.03 K/UL — SIGNIFICANT CHANGE UP (ref 3.8–10.5)
WBC # FLD AUTO: 5.03 K/UL — SIGNIFICANT CHANGE UP (ref 3.8–10.5)
WBC # FLD AUTO: 5.03 K/UL — SIGNIFICANT CHANGE UP (ref 3.8–10.5)

## 2023-10-21 PROCEDURE — 93010 ELECTROCARDIOGRAM REPORT: CPT

## 2023-10-21 PROCEDURE — 99284 EMERGENCY DEPT VISIT MOD MDM: CPT

## 2023-10-21 RX ORDER — HYDROMORPHONE HYDROCHLORIDE 2 MG/ML
1 INJECTION INTRAMUSCULAR; INTRAVENOUS; SUBCUTANEOUS ONCE
Refills: 0 | Status: DISCONTINUED | OUTPATIENT
Start: 2023-10-21 | End: 2023-10-21

## 2023-10-21 RX ORDER — LIDOCAINE 4 G/100G
1 CREAM TOPICAL ONCE
Refills: 0 | Status: COMPLETED | OUTPATIENT
Start: 2023-10-21 | End: 2023-10-21

## 2023-10-21 RX ORDER — HYDROMORPHONE HYDROCHLORIDE 2 MG/ML
8 INJECTION INTRAMUSCULAR; INTRAVENOUS; SUBCUTANEOUS ONCE
Refills: 0 | Status: DISCONTINUED | OUTPATIENT
Start: 2023-10-21 | End: 2023-10-21

## 2023-10-21 RX ADMIN — HYDROMORPHONE HYDROCHLORIDE 1 MILLIGRAM(S): 2 INJECTION INTRAMUSCULAR; INTRAVENOUS; SUBCUTANEOUS at 12:03

## 2023-10-21 RX ADMIN — HYDROMORPHONE HYDROCHLORIDE 1 MILLIGRAM(S): 2 INJECTION INTRAMUSCULAR; INTRAVENOUS; SUBCUTANEOUS at 10:55

## 2023-10-21 RX ADMIN — HYDROMORPHONE HYDROCHLORIDE 1 MILLIGRAM(S): 2 INJECTION INTRAMUSCULAR; INTRAVENOUS; SUBCUTANEOUS at 12:56

## 2023-10-21 RX ADMIN — LIDOCAINE 1 PATCH: 4 CREAM TOPICAL at 08:38

## 2023-10-21 RX ADMIN — HYDROMORPHONE HYDROCHLORIDE 1 MILLIGRAM(S): 2 INJECTION INTRAMUSCULAR; INTRAVENOUS; SUBCUTANEOUS at 08:31

## 2023-10-21 RX ADMIN — HYDROMORPHONE HYDROCHLORIDE 8 MILLIGRAM(S): 2 INJECTION INTRAMUSCULAR; INTRAVENOUS; SUBCUTANEOUS at 13:38

## 2023-10-21 RX ADMIN — HYDROMORPHONE HYDROCHLORIDE 1 MILLIGRAM(S): 2 INJECTION INTRAMUSCULAR; INTRAVENOUS; SUBCUTANEOUS at 10:20

## 2023-10-21 NOTE — ED ADULT NURSE NOTE - OBJECTIVE STATEMENT
27yo female received in room 7. pt A&OX4, ambulatory hx of Marysol-Danlos syndrome, stage 4 endometriosis, c/o pain to bilateral upper and lower extremities x 1 month, also c/o burning to chest and upper back x 7 days. States they recently found a 9mm nodule in her lung. Sent in by pain management MD to rule out for guillain barre and tegan brown. Last took 4mg of dilaudid around 4am. NSR on monitor. Side rails up, bed at lowest position, call bell within reach, patient oriented to the unit, safety maintained. Awaiting for MD stratton

## 2023-10-21 NOTE — ED ADULT TRIAGE NOTE - CHIEF COMPLAINT QUOTE
sent in By PMD. for R/O guillain barre. and. Pt endorsing Pain to all extremities x1 month. burning in chest and back x7 days. No complaints of headache, nausea, dizziness, vomiting  SOB,  chills verbalized. phx Marysol-Danlos syndrome stage 4 endometriosis.

## 2023-10-21 NOTE — ED ADULT NURSE REASSESSMENT NOTE - NS ED NURSE REASSESS COMMENT FT1
report received from overnight RN. NAD. pt denies SOB, chest pain, dizziness, weakness, urinary symptoms, HA, n/v/d, fevers, chills. respirations are even and un labored. skin intact. 20g placed to RAC. labs drawn and sent. pt on continuos monitor, NSR. EKG obtained. safety precautions maintained. call bell at bedside.

## 2023-10-21 NOTE — ED PROVIDER NOTE - PROGRESS NOTE DETAILS
MD Radha (PGY-2) neuro consulted.  To see patient. MD Radha (PGY-2) patient's labs reviewed.  Patient labs overall unremarkable.  Patient was noted to be positive for mono.  Patient was also evaluated by neurology, does not believe that patient's symptoms are consistent with GBS.  Offered patient admission for pain control and rheumatology evaluation.  Patient would prefer to go home.  Patient requesting additional pain meds.  Patient has stated that her typical pain regimen includes Dilaudid 16 extended release, Dilaudid 8 for breakthrough pain.  Will administer Dilaudid 8 and discharge patient.

## 2023-10-21 NOTE — ED PROVIDER NOTE - ATTENDING CONTRIBUTION TO CARE
Attending note:   After face to face evaluation of this patient, I concur with above noted hx, pe, and care plan for this patient.  Godoy: 28-year-old female with history of Marysol-Danlos and endometriosis and fibromyalgia.  Patient also has chronic back pain is followed by pain management.  Patient presents the ED with multiple complaints for the last 1 month.  Patient has noted diffuse joint swelling and pain along with weakness and low-grade fevers.  Patient also states she has been producing increased amount of saliva and face also appears puffy as per  at bedside.  Patient was seen by her pain management and sent to the ED for possible GBS.  Patient has noted numbness and tingling in multiple areas but not in a progressive pattern.  Patient states she is having weakness of the hands and is not able to open formula for her 3-month-old child but has no difficulty with ambulation.  Patient is afebrile rectally and vitals are stable.  Patient has no gross motor deficits with strength being 4+ out of 5 in all extremities.  Sensation is normal and gait is normal.  Lungs are clear and heart is regular rate and rhythm.  Oropharynx is normal.  Patient does have extremely unusual symptoms but GBS can be present uncommonly.  We will check labs, give pain medication and fluids.  Neuro consult also requested.  Patient also requesting full viral panel which has been sent.  Pain medication does help patient's pain but is requiring multiple doses.  Patient offered admission for further pain control but is eager to get home to child.

## 2023-10-21 NOTE — ED PROVIDER NOTE - PATIENT PORTAL LINK FT
You can access the FollowMyHealth Patient Portal offered by [facility] by registering at the following website: [email or web address]. By joining [email or web address] FollowMyHealth portal, you will also be able to view your health information using other applications (apps) compatible with our system.

## 2023-10-21 NOTE — ED PROVIDER NOTE - CLINICAL SUMMARY MEDICAL DECISION MAKING FREE TEXT BOX
HPI:  28-year-old female with past medical history of Marysol-Danlos, stage IV endometriosis, fibromyalgia, chronic back pain (following with pain management), presenting with 1 month of diffuse pain, weakness, low-grade fevers.  Patient saw her pain management doctor recently and sent her in due to concerns for GBS.  Pain and weakness was not directional, not ascending.  Denies any episodes of nausea vomiting diarrhea, but patient reports having a drain with abscess from her .  Patient reports her weakness is particularly worse in her hands, difficulty opening bottles.  Patient reports diffuse pain, that is worse than her baseline, specifically her shoulders, elbows, finger joints, with some swelling.    Denies saddle anesthesia, urinary or bowel incontinence.  Patient last had a spinal block 3 months ago, when she had a .  Patient has been ambulatory since then.    ROS:  Negative except as noted in HPI    Physical exam:  Const: not in acute distress  Eyes: no conjunctival injection  HEENT: Head NCAT, Moist MM.  Neck: Trachea midline.   CVS: +S1/S2, Peripheral pulses 2+ and equal in all extremities.  RESP: Unlabored respiratory effort. Clear to auscultation bilaterally.  GI: Nontender/Nondistended, No CVA tenderness b/l.   MSK: Normocephalic/Atraumatic, No Lower Extremities edema b/l.   Skin: Intact.   Neuro: CNs II-XII grossly intact. Motor & Sensation grossly intact.  Knee reflexes intact.  Psych: Awake, Alert, & Cooperative    MDM:  28-year-old female with past medical history of Marysol-Danlos, stage IV endometriosis, fibromyalgia, chronic back pain (following with pain management), presenting with 1 month of diffuse pain, weakness, low-grade fevers.  Hemodynamically stable.  Temperature 99.5, will obtain core temperature measurement.  Physical exam notable for cranial nerves grossly intact, motor grossly intact, sensation grossly intact, knee reflexes intact.    The differential diagnosis includes but is not limited to rheumatological issues, thyroid issue, reactive arthritis secondary to chlamydia/GC, infectious causes including UTI, mono, vs viral illness, anemia.  Low suspicion for GBS at this time, will consider consulting neurology for further evaluation.    Will obtain labs, coags, mag and phos, RVP, STD testing including chlamydia/GS, syphilis, mono, UA/UC.  Dispo pending work-up.

## 2023-10-21 NOTE — CONSULT NOTE ADULT - SUBJECTIVE AND OBJECTIVE BOX
Neurology - Consult Note    -  Spectra: [phone] ([facility]), [phone] ([facility])  -    HPI: Patient JEFERSON SAM is a 28y (1995) woman with a PMHx significant for GERD, EHS "Hypermobile type", posterior fusion and laminectomy at L5-S1 with an interbody fusion graft in place,  stage IV endometriosis, fibromyalgia, chronic dorsalgia, follows with pain management p/w worsening pain, weakness, and fatigue x 1 month. Woke up 1 month ago with pain worse in hands and shoudlers, mostly in joints but also diffusely involving muscles.   ED Course: 37.5C, HR 70s/80s, normotensive, satting well on RA. No leukocytosis. Infectious mononucleosis screen positive.    Review of Systems:    CONSTITUTIONAL: +chills, no weight loss  EYES AND ENT: No visual changes or no throat pain   NECK: neck not in more pain than other partds of body  RESPIRATORY: No hemoptysis or shortness of breath  CARDIOVASCULAR: No chest pain or palpitations  GASTROINTESTINAL: No melena or hematochezia  GENITOURINARY: No dysuria or hematuria  NEUROLOGICAL: +As stated in HPI above  SKIN: No itching, burning, rashes, or lesions   All other review of systems is negative unless indicated above.    Allergies:  sulfa drugs (Rash)  penicillins (Rash)      PMHx/PSHx/Family Hx: As above, otherwise see below   GERD (gastroesophageal reflux disease)    Marysol-Danlos disease    Endometriosis    Fibromyalgia        Social Hx:      Medications:  MEDICATIONS  (STANDING):    MEDICATIONS  (PRN):      Vitals:  T(C): 36.8 (10-21-23 @ 13:45), Max: 37.5 (10-21-23 @ 06:41)  HR: 78 (10-21-23 @ 13:45) (73 - 83)  BP: 123/82 (10-21-23 @ 13:45) (105/73 - 123/82)  RR: 16 (10-21-23 @ 13:45) (15 - 16)  SpO2: 100% (10-21-23 @ 13:45) (100% - 100%)    Physical Examination:   General - NAD  Cardiovascular - Peripheral pulses palpable, no edema  Eyes - Clear sclera  Neck: + lymph nodes  Neurologic Exam:  Mental status - Awake, Alert, Oriented to person, place, and time. Speech fluent, repetition and naming intact. Follows simple and complex commands.    Cranial nerves - PERRL, VFF, EOMI, face sensation (V1-V3) intact b/l, facial strength intact without asymmetry b/l, hearing intact b/l, palate with symmetric elevation, trapezius 5/5 strength b/l, tongue midline on protrusion with full lateral movement    Motor - Normal bulk and tone throughout. No pronator drift.  Strength testing            Deltoid      Biceps      Triceps         R            5                 5               5                     5                           L             5                 5               5                     5                                         Hip Flexion      Knee Extension    Dorsiflexion    Plantar Flexion  R              5                           5                       5                           5                     L              5                           5                        5                           5                            Sensation - Light touch intact throughout    DTR's -             Biceps      Triceps     Brachioradialis      Patellar    Ankle    Toes/plantar response  R             2+             2+                  2+                       2+            2+                 Down  L              2+             2+                 2+                        2+           2+                 Down    Coordination - Finger to Nose intact b/l. No tremors appreciated    Gait and station - Deferred     Labs:                        12.3   5.03  )-----------( 325      ( 21 Oct 2023 08:18 )             35.9     10-21    136  |  102  |  17  ----------------------------<  88  4.2   |  21<L>  |  0.55    Ca    8.9      21 Oct 2023 08:18  Phos  4.1     10-21  Mg     2.00     10-21    TPro  7.4  /  Alb  4.4  /  TBili  0.2  /  DBili  x   /  AST  17  /  ALT  12  /  AlkPhos  65  10-21    CAPILLARY BLOOD GLUCOSE        LIVER FUNCTIONS - ( 21 Oct 2023 08:18 )  Alb: 4.4 g/dL / Pro: 7.4 g/dL / ALK PHOS: 65 U/L / ALT: 12 U/L / AST: 17 U/L / GGT: x             PT/INR - ( 21 Oct 2023 08:18 )   PT: 12.3 sec;   INR: 1.09 ratio         PTT - ( 21 Oct 2023 08:18 )  PTT:41.0 sec  CSF:                  Radiology:  outpatient CT scans reviewwed. no acute imaging       Neurology - Consult Note    -  Spectra: [phone] ([facility]), [phone] ([facility])  -    HPI: Patient JEFERSON SAM is a 28y (1995) woman with a PMHx significant for GERD, EHS "Hypermobile type", posterior fusion and laminectomy at L5-S1 with an interbody fusion graft in place,  stage IV endometriosis, fibromyalgia, chronic dorsalgia, follows with pain management p/w worsening pain, weakness, and fatigue x 1 month. Woke up 1 month ago with pain worse in hands and shoulders mostly in joints but also diffusely involving muscles. Notes increased salivation but no difficulty swallowing. She was sent in by outside provider for concern of GBS. Denies bulbar symptoms. Denies focal weakness. Denies diarrheal illness. Denies diplopia.   ED Course: 37.5C, HR 70s/80s, normotensive, satting well on RA. No leukocytosis. Infectious mononucleosis screen positive.      Review of Systems:    CONSTITUTIONAL: +chills, no weight loss  EYES AND ENT: No visual changes or no throat pain   NECK: neck not in more pain than other partds of body  RESPIRATORY: No hemoptysis or shortness of breath  CARDIOVASCULAR: No chest pain or palpitations  GASTROINTESTINAL: No melena or hematochezia  GENITOURINARY: No dysuria or hematuria  NEUROLOGICAL: +As stated in HPI above  SKIN: No itching, burning, rashes, or lesions   All other review of systems is negative unless indicated above.    Allergies:  sulfa drugs (Rash)  penicillins (Rash)      PMHx/PSHx/Family Hx: As above, otherwise see below   GERD (gastroesophageal reflux disease)    Marysol-Danlos disease    Endometriosis    Fibromyalgia        Social Hx:      Medications:  MEDICATIONS  (STANDING):    MEDICATIONS  (PRN):      Vitals:  T(C): 36.8 (10-21-23 @ 13:45), Max: 37.5 (10-21-23 @ 06:41)  HR: 78 (10-21-23 @ 13:45) (73 - 83)  BP: 123/82 (10-21-23 @ 13:45) (105/73 - 123/82)  RR: 16 (10-21-23 @ 13:45) (15 - 16)  SpO2: 100% (10-21-23 @ 13:45) (100% - 100%)    Physical Examination:   General - NAD  Cardiovascular - Peripheral pulses palpable, no edema  Eyes - Clear sclera  Neck: + lymph nodes  Neurologic Exam:  Mental status - Awake, Alert, Oriented to person, place, and time. Speech fluent, repetition and naming intact. Follows simple and complex commands.    Cranial nerves - PERRL, VFF, EOMI, face sensation (V1-V3) intact b/l, facial strength intact without asymmetry b/l, hearing intact b/l, palate with symmetric elevation, trapezius 5/5 strength b/l, tongue midline on protrusion with full lateral movement    Motor - Normal bulk and tone throughout. No pronator drift.  Strength testing            Deltoid      Biceps      Triceps         R            5                 5               5                     5                           L             5                 5               5                     5                                         Hip Flexion      Knee Extension    Dorsiflexion    Plantar Flexion  R              5                           5                       5                           5                     L              5                           5                        5                           5                            Sensation - Light touch intact throughout    DTR's -             Biceps      Triceps     Brachioradialis      Patellar    Ankle    Toes/plantar response  R             2+             2+                  2+                       2+            2+                 Down  L              2+             2+                 2+                        2+           2+                 Down    Coordination - Finger to Nose intact b/l. No tremors appreciated    Gait and station - Deferred     Labs:                        12.3   5.03  )-----------( 325      ( 21 Oct 2023 08:18 )             35.9     10-21    136  |  102  |  17  ----------------------------<  88  4.2   |  21<L>  |  0.55    Ca    8.9      21 Oct 2023 08:18  Phos  4.1     10-21  Mg     2.00     10-21    TPro  7.4  /  Alb  4.4  /  TBili  0.2  /  DBili  x   /  AST  17  /  ALT  12  /  AlkPhos  65  10-21    CAPILLARY BLOOD GLUCOSE        LIVER FUNCTIONS - ( 21 Oct 2023 08:18 )  Alb: 4.4 g/dL / Pro: 7.4 g/dL / ALK PHOS: 65 U/L / ALT: 12 U/L / AST: 17 U/L / GGT: x             PT/INR - ( 21 Oct 2023 08:18 )   PT: 12.3 sec;   INR: 1.09 ratio         PTT - ( 21 Oct 2023 08:18 )  PTT:41.0 sec  CSF:                  Radiology:  outpatient CT scans reviewwed. no acute imaging

## 2023-10-21 NOTE — ED PROVIDER NOTE - NSFOLLOWUPINSTRUCTIONS_ED_ALL_ED_FT
You were seen in the Emergency Department for generalized pain and weakness.  You received lab work and were seen by neurology in the ED.  Your lab work was only notable for being positive for mono.  Neurology does not believe that your symptoms are consistent with GBS which was a concern that brought you into the ED.  You preferred to rheumatology for further evaluation.    1) Advance activity as tolerated.   2) Continue all previously prescribed medications as directed.    3) Follow up with rheumatology in 1 to 2 weeks- take copies of your results.    4) Return to the Emergency Department for worsening or persistent symptoms, and/or ANY NEW OR CONCERNING SYMPTOMS.

## 2023-10-21 NOTE — CONSULT NOTE ADULT - ASSESSMENT
HPI: Patient JEFERSON SAM is a 28y (1995) woman with a PMHx significant for GERD, EHS "Hypermobile type", posterior fusion and laminectomy at L5-S1 with an interbody fusion graft in place,  stage IV endometriosis, fibromyalgia, chronic dorsalgia, follows with pain management p/w worsening pain, weakness, and fatigue x 1 month. Woke up 1 month ago with pain worse in hands and shoulders mostly in joints but also diffusely involving muscles. Found to have mononucleosis.     Impression: Diffuse pain, weakness, fatigue is likely subacute on chronic iso mononucleosis and chronic pain/fibromyalgia/connective tissue disorder. We are not concerned for a Guillain [place] Syndrome at this time    Recommendations  []Pain management  []Rheumatology consultation  []Defer LP at this time    Patient seen and discussed with neurology attending. Recommendations final upon attestation.  HPI: Patient JEFERSON SAM is a 28y (1995) woman with a PMHx significant for GERD, EHS "Hypermobile type", posterior fusion and laminectomy at L5-S1 with an interbody fusion graft in place,  stage IV endometriosis, fibromyalgia, chronic dorsalgia, follows with pain management p/w worsening pain, weakness, and fatigue x 1 month. Woke up 1 month ago with pain worse in hands and shoulders mostly in joints but also diffusely involving muscles. Found to have mononucleosis.  She was sent in by outside provider for concern of GBS. Denies bulbar symptoms. Denies focal weakness. Denies diarrheal illness. Denies diplopia. Exam with intact reflexes and full strength.     Impression: Diffuse pain, weakness, fatigue is likely subacute on chronic iso mononucleosis and chronic pain/fibromyalgia/connective tissue disorder. We are not concerned for a Guillain [place] Syndrome at this time    Recommendations  []Pain management  []Rheumatology consultation  []Defer LP at this time  Recommendations communicated to ED at time of exam. Patient seen and discussed with neurology attending. Recommendations final upon attestation.

## 2023-10-22 ENCOUNTER — EMERGENCY (EMERGENCY)
Facility: HOSPITAL | Age: 28
LOS: 1 days | Discharge: HOME | End: 2023-10-22
Attending: EMERGENCY MEDICINE | Admitting: EMERGENCY MEDICINE
Payer: COMMERCIAL

## 2023-10-22 VITALS
TEMPERATURE: 98 F | DIASTOLIC BLOOD PRESSURE: 85 MMHG | RESPIRATION RATE: 17 BRPM | OXYGEN SATURATION: 100 % | HEART RATE: 85 BPM | SYSTOLIC BLOOD PRESSURE: 116 MMHG

## 2023-10-22 VITALS
DIASTOLIC BLOOD PRESSURE: 74 MMHG | RESPIRATION RATE: 18 BRPM | OXYGEN SATURATION: 100 % | HEART RATE: 88 BPM | TEMPERATURE: 99 F | HEIGHT: 62 IN | SYSTOLIC BLOOD PRESSURE: 120 MMHG

## 2023-10-22 DIAGNOSIS — Z98.890 OTHER SPECIFIED POSTPROCEDURAL STATES: Chronic | ICD-10-CM

## 2023-10-22 DIAGNOSIS — Z98.89 OTHER SPECIFIED POSTPROCEDURAL STATES: Chronic | ICD-10-CM

## 2023-10-22 LAB
ALBUMIN SERPL ELPH-MCNC: 4.6 G/DL — SIGNIFICANT CHANGE UP (ref 3.3–5)
ALBUMIN SERPL ELPH-MCNC: 4.6 G/DL — SIGNIFICANT CHANGE UP (ref 3.3–5)
ALP SERPL-CCNC: 68 U/L — SIGNIFICANT CHANGE UP (ref 40–120)
ALP SERPL-CCNC: 68 U/L — SIGNIFICANT CHANGE UP (ref 40–120)
ALT FLD-CCNC: 13 U/L — SIGNIFICANT CHANGE UP (ref 4–33)
ALT FLD-CCNC: 13 U/L — SIGNIFICANT CHANGE UP (ref 4–33)
ANION GAP SERPL CALC-SCNC: 11 MMOL/L — SIGNIFICANT CHANGE UP (ref 7–14)
ANION GAP SERPL CALC-SCNC: 11 MMOL/L — SIGNIFICANT CHANGE UP (ref 7–14)
APPEARANCE UR: CLEAR — SIGNIFICANT CHANGE UP
APPEARANCE UR: CLEAR — SIGNIFICANT CHANGE UP
AST SERPL-CCNC: 15 U/L — SIGNIFICANT CHANGE UP (ref 4–32)
AST SERPL-CCNC: 15 U/L — SIGNIFICANT CHANGE UP (ref 4–32)
BASOPHILS # BLD AUTO: 0.04 K/UL — SIGNIFICANT CHANGE UP (ref 0–0.2)
BASOPHILS # BLD AUTO: 0.04 K/UL — SIGNIFICANT CHANGE UP (ref 0–0.2)
BASOPHILS NFR BLD AUTO: 0.6 % — SIGNIFICANT CHANGE UP (ref 0–2)
BASOPHILS NFR BLD AUTO: 0.6 % — SIGNIFICANT CHANGE UP (ref 0–2)
BILIRUB SERPL-MCNC: <0.2 MG/DL — SIGNIFICANT CHANGE UP (ref 0.2–1.2)
BILIRUB SERPL-MCNC: <0.2 MG/DL — SIGNIFICANT CHANGE UP (ref 0.2–1.2)
BILIRUB UR-MCNC: NEGATIVE — SIGNIFICANT CHANGE UP
BILIRUB UR-MCNC: NEGATIVE — SIGNIFICANT CHANGE UP
BUN SERPL-MCNC: 19 MG/DL — SIGNIFICANT CHANGE UP (ref 7–23)
BUN SERPL-MCNC: 19 MG/DL — SIGNIFICANT CHANGE UP (ref 7–23)
CALCIUM SERPL-MCNC: 9.7 MG/DL — SIGNIFICANT CHANGE UP (ref 8.4–10.5)
CALCIUM SERPL-MCNC: 9.7 MG/DL — SIGNIFICANT CHANGE UP (ref 8.4–10.5)
CHLORIDE SERPL-SCNC: 101 MMOL/L — SIGNIFICANT CHANGE UP (ref 98–107)
CHLORIDE SERPL-SCNC: 101 MMOL/L — SIGNIFICANT CHANGE UP (ref 98–107)
CO2 SERPL-SCNC: 24 MMOL/L — SIGNIFICANT CHANGE UP (ref 22–31)
CO2 SERPL-SCNC: 24 MMOL/L — SIGNIFICANT CHANGE UP (ref 22–31)
COLOR SPEC: YELLOW — SIGNIFICANT CHANGE UP
COLOR SPEC: YELLOW — SIGNIFICANT CHANGE UP
CREAT SERPL-MCNC: 0.63 MG/DL — SIGNIFICANT CHANGE UP (ref 0.5–1.3)
CREAT SERPL-MCNC: 0.63 MG/DL — SIGNIFICANT CHANGE UP (ref 0.5–1.3)
DIFF PNL FLD: NEGATIVE — SIGNIFICANT CHANGE UP
DIFF PNL FLD: NEGATIVE — SIGNIFICANT CHANGE UP
EGFR: 125 ML/MIN/1.73M2 — SIGNIFICANT CHANGE UP
EGFR: 125 ML/MIN/1.73M2 — SIGNIFICANT CHANGE UP
EOSINOPHIL # BLD AUTO: 0.1 K/UL — SIGNIFICANT CHANGE UP (ref 0–0.5)
EOSINOPHIL # BLD AUTO: 0.1 K/UL — SIGNIFICANT CHANGE UP (ref 0–0.5)
EOSINOPHIL NFR BLD AUTO: 1.5 % — SIGNIFICANT CHANGE UP (ref 0–6)
EOSINOPHIL NFR BLD AUTO: 1.5 % — SIGNIFICANT CHANGE UP (ref 0–6)
GLUCOSE SERPL-MCNC: 88 MG/DL — SIGNIFICANT CHANGE UP (ref 70–99)
GLUCOSE SERPL-MCNC: 88 MG/DL — SIGNIFICANT CHANGE UP (ref 70–99)
GLUCOSE UR QL: NEGATIVE MG/DL — SIGNIFICANT CHANGE UP
GLUCOSE UR QL: NEGATIVE MG/DL — SIGNIFICANT CHANGE UP
HCG SERPL-ACNC: <1 MIU/ML — SIGNIFICANT CHANGE UP
HCG SERPL-ACNC: <1 MIU/ML — SIGNIFICANT CHANGE UP
HCG UR QL: NEGATIVE — SIGNIFICANT CHANGE UP
HCG UR QL: NEGATIVE — SIGNIFICANT CHANGE UP
HCT VFR BLD CALC: 35.6 % — SIGNIFICANT CHANGE UP (ref 34.5–45)
HCT VFR BLD CALC: 35.6 % — SIGNIFICANT CHANGE UP (ref 34.5–45)
HGB BLD-MCNC: 12.1 G/DL — SIGNIFICANT CHANGE UP (ref 11.5–15.5)
HGB BLD-MCNC: 12.1 G/DL — SIGNIFICANT CHANGE UP (ref 11.5–15.5)
HIV 1+2 AB+HIV1 P24 AG SERPL QL IA: SIGNIFICANT CHANGE UP
HIV 1+2 AB+HIV1 P24 AG SERPL QL IA: SIGNIFICANT CHANGE UP
IANC: 3.71 K/UL — SIGNIFICANT CHANGE UP (ref 1.8–7.4)
IANC: 3.71 K/UL — SIGNIFICANT CHANGE UP (ref 1.8–7.4)
IMM GRANULOCYTES NFR BLD AUTO: 0.3 % — SIGNIFICANT CHANGE UP (ref 0–0.9)
IMM GRANULOCYTES NFR BLD AUTO: 0.3 % — SIGNIFICANT CHANGE UP (ref 0–0.9)
KETONES UR-MCNC: NEGATIVE MG/DL — SIGNIFICANT CHANGE UP
KETONES UR-MCNC: NEGATIVE MG/DL — SIGNIFICANT CHANGE UP
LEUKOCYTE ESTERASE UR-ACNC: NEGATIVE — SIGNIFICANT CHANGE UP
LEUKOCYTE ESTERASE UR-ACNC: NEGATIVE — SIGNIFICANT CHANGE UP
LIDOCAIN IGE QN: 50 U/L — SIGNIFICANT CHANGE UP (ref 7–60)
LIDOCAIN IGE QN: 50 U/L — SIGNIFICANT CHANGE UP (ref 7–60)
LYMPHOCYTES # BLD AUTO: 2.33 K/UL — SIGNIFICANT CHANGE UP (ref 1–3.3)
LYMPHOCYTES # BLD AUTO: 2.33 K/UL — SIGNIFICANT CHANGE UP (ref 1–3.3)
LYMPHOCYTES # BLD AUTO: 35.5 % — SIGNIFICANT CHANGE UP (ref 13–44)
LYMPHOCYTES # BLD AUTO: 35.5 % — SIGNIFICANT CHANGE UP (ref 13–44)
MCHC RBC-ENTMCNC: 27.8 PG — SIGNIFICANT CHANGE UP (ref 27–34)
MCHC RBC-ENTMCNC: 27.8 PG — SIGNIFICANT CHANGE UP (ref 27–34)
MCHC RBC-ENTMCNC: 34 GM/DL — SIGNIFICANT CHANGE UP (ref 32–36)
MCHC RBC-ENTMCNC: 34 GM/DL — SIGNIFICANT CHANGE UP (ref 32–36)
MCV RBC AUTO: 81.8 FL — SIGNIFICANT CHANGE UP (ref 80–100)
MCV RBC AUTO: 81.8 FL — SIGNIFICANT CHANGE UP (ref 80–100)
MONOCYTES # BLD AUTO: 0.36 K/UL — SIGNIFICANT CHANGE UP (ref 0–0.9)
MONOCYTES # BLD AUTO: 0.36 K/UL — SIGNIFICANT CHANGE UP (ref 0–0.9)
MONOCYTES NFR BLD AUTO: 5.5 % — SIGNIFICANT CHANGE UP (ref 2–14)
MONOCYTES NFR BLD AUTO: 5.5 % — SIGNIFICANT CHANGE UP (ref 2–14)
NEUTROPHILS # BLD AUTO: 3.71 K/UL — SIGNIFICANT CHANGE UP (ref 1.8–7.4)
NEUTROPHILS # BLD AUTO: 3.71 K/UL — SIGNIFICANT CHANGE UP (ref 1.8–7.4)
NEUTROPHILS NFR BLD AUTO: 56.6 % — SIGNIFICANT CHANGE UP (ref 43–77)
NEUTROPHILS NFR BLD AUTO: 56.6 % — SIGNIFICANT CHANGE UP (ref 43–77)
NITRITE UR-MCNC: NEGATIVE — SIGNIFICANT CHANGE UP
NITRITE UR-MCNC: NEGATIVE — SIGNIFICANT CHANGE UP
NRBC # BLD: 0 /100 WBCS — SIGNIFICANT CHANGE UP (ref 0–0)
NRBC # BLD: 0 /100 WBCS — SIGNIFICANT CHANGE UP (ref 0–0)
NRBC # FLD: 0 K/UL — SIGNIFICANT CHANGE UP (ref 0–0)
NRBC # FLD: 0 K/UL — SIGNIFICANT CHANGE UP (ref 0–0)
PH UR: 6.5 — SIGNIFICANT CHANGE UP (ref 5–8)
PH UR: 6.5 — SIGNIFICANT CHANGE UP (ref 5–8)
PLATELET # BLD AUTO: 385 K/UL — SIGNIFICANT CHANGE UP (ref 150–400)
PLATELET # BLD AUTO: 385 K/UL — SIGNIFICANT CHANGE UP (ref 150–400)
POTASSIUM SERPL-MCNC: 4.3 MMOL/L — SIGNIFICANT CHANGE UP (ref 3.5–5.3)
POTASSIUM SERPL-MCNC: 4.3 MMOL/L — SIGNIFICANT CHANGE UP (ref 3.5–5.3)
POTASSIUM SERPL-SCNC: 4.3 MMOL/L — SIGNIFICANT CHANGE UP (ref 3.5–5.3)
POTASSIUM SERPL-SCNC: 4.3 MMOL/L — SIGNIFICANT CHANGE UP (ref 3.5–5.3)
PROT SERPL-MCNC: 7.4 G/DL — SIGNIFICANT CHANGE UP (ref 6–8.3)
PROT SERPL-MCNC: 7.4 G/DL — SIGNIFICANT CHANGE UP (ref 6–8.3)
PROT UR-MCNC: NEGATIVE MG/DL — SIGNIFICANT CHANGE UP
PROT UR-MCNC: NEGATIVE MG/DL — SIGNIFICANT CHANGE UP
RBC # BLD: 4.35 M/UL — SIGNIFICANT CHANGE UP (ref 3.8–5.2)
RBC # BLD: 4.35 M/UL — SIGNIFICANT CHANGE UP (ref 3.8–5.2)
RBC # FLD: 13 % — SIGNIFICANT CHANGE UP (ref 10.3–14.5)
RBC # FLD: 13 % — SIGNIFICANT CHANGE UP (ref 10.3–14.5)
SODIUM SERPL-SCNC: 136 MMOL/L — SIGNIFICANT CHANGE UP (ref 135–145)
SODIUM SERPL-SCNC: 136 MMOL/L — SIGNIFICANT CHANGE UP (ref 135–145)
SP GR SPEC: 1.02 — SIGNIFICANT CHANGE UP (ref 1–1.03)
SP GR SPEC: 1.02 — SIGNIFICANT CHANGE UP (ref 1–1.03)
UROBILINOGEN FLD QL: 0.2 MG/DL — SIGNIFICANT CHANGE UP (ref 0.2–1)
UROBILINOGEN FLD QL: 0.2 MG/DL — SIGNIFICANT CHANGE UP (ref 0.2–1)
WBC # BLD: 6.56 K/UL — SIGNIFICANT CHANGE UP (ref 3.8–10.5)
WBC # BLD: 6.56 K/UL — SIGNIFICANT CHANGE UP (ref 3.8–10.5)
WBC # FLD AUTO: 6.56 K/UL — SIGNIFICANT CHANGE UP (ref 3.8–10.5)
WBC # FLD AUTO: 6.56 K/UL — SIGNIFICANT CHANGE UP (ref 3.8–10.5)

## 2023-10-22 PROCEDURE — 99285 EMERGENCY DEPT VISIT HI MDM: CPT

## 2023-10-22 RX ORDER — HYDROMORPHONE HYDROCHLORIDE 2 MG/ML
1 INJECTION INTRAMUSCULAR; INTRAVENOUS; SUBCUTANEOUS ONCE
Refills: 0 | Status: DISCONTINUED | OUTPATIENT
Start: 2023-10-22 | End: 2023-10-22

## 2023-10-22 RX ADMIN — HYDROMORPHONE HYDROCHLORIDE 1 MILLIGRAM(S): 2 INJECTION INTRAMUSCULAR; INTRAVENOUS; SUBCUTANEOUS at 21:09

## 2023-10-22 RX ADMIN — HYDROMORPHONE HYDROCHLORIDE 1 MILLIGRAM(S): 2 INJECTION INTRAMUSCULAR; INTRAVENOUS; SUBCUTANEOUS at 18:08

## 2023-10-22 RX ADMIN — HYDROMORPHONE HYDROCHLORIDE 1 MILLIGRAM(S): 2 INJECTION INTRAMUSCULAR; INTRAVENOUS; SUBCUTANEOUS at 22:00

## 2023-10-22 NOTE — ED PROVIDER NOTE - ATTENDING CONTRIBUTION TO CARE
Brief HPI:  28-year-old female past medical history of endometriosis, Marysol-Danlos syndrome,  section complicated by intra-abdominal abscess approximately 2 months ago, lumbar laminectomy followed by pain management for chronic back pain presents for diffuse body pains, fever, rash, vaginal discharge for approximately 1 month.  Patient was seen in the ED 1 day ago with work-up revealing mononucleosis.  Patient is concerned because she is suspicious that her partner may have infected her with gonorrhea and that she has disseminated gonorrhea.  Patient had gonorrhea chlamydia urine testing done which was negative, however she is requesting cervical, and anal swabbing.  Patient feels that her symptoms are "more than a monoinfection".  Patient describes petechial rash on her forearms that is been present for about a month.  Describes fevers as "low-grade".  Vaginal discharge is white and nonbloody.  Patient denies recent travel, sick contacts, insect bites.  Denies illicit drug use, alcohol use, tobacco use.    Vitals:   Reviewed    Exam:    GEN:  Non-toxic appearing, non-distressed, speaking full sentences, non-diaphoretic, AAOx3  HEENT:  NCAT, neck supple, EOMI, PERRLA, sclera anicteric, no conjunctival pallor or injection, no stridor, normal voice, no tonsillar exudate, uvula midline  CV:  regular rhythm and rate, s1/s2 audible, no murmurs, rubs or gallops, peripheral pulses 2+ and symmetric  PULM:  non-labored respirations, lungs clear to auscultation bilaterally, no wheezes, crackles or rales  ABD:  non distended, non-tender, no rebound, no guarding, negative Muse's sign, bowel sounds normal, no cvat  MSK:  no gross deformity, non-tender extremities and joints, range of motion grossly normal appearing, no extremity edema, extremities warm and well perfused   NEURO:  AAOx3, CN II-XII intact, motor 5/5 in upper and lower extremities bilaterally, sensation grossly intact in extremities and trunk, finger to nose testing wnl, no nystagmus, negative Romberg, no pronator drift, no gait deficit  SKIN:  warm, dry, no rash or vesicles   :      A/P:  28-year-old female past medical history of endometriosis, Marysol-Danlos syndrome,  section complicated by intra-abdominal abscess approximately 2 months ago, lumbar laminectomy followed by pain management for chronic back pain presents for diffuse body pains, fever, rash, vaginal discharge for approximately 1 month.  Vital signs stable, afebrile.  Exam nontoxic-appearing, resident GYN exam not consistent with PID.  Suspect symptoms related to mononucleosis, however given chronicity there is possibility for rheumatological process.  Will send labs and provide analgesia.  Disposition Pending.

## 2023-10-22 NOTE — ED PROVIDER NOTE - PROGRESS NOTE DETAILS
BARKER:  Labs nonactionable.  Patient with diffuse myalgias, requiring IV Dilaudid.  Patient reports poor response, offered admission for pain control and possible rheumatology and infectious disease consult.  After initially agreeing to admission, patient is requesting discharge with plan for outpatient follow-up.  Patient appears stable for discharge.

## 2023-10-22 NOTE — ED ADULT NURSE NOTE - NSFALLUNIVINTERV_ED_ALL_ED
Bed/Stretcher in lowest position, wheels locked, appropriate side rails in place/Call bell, personal items and telephone in reach/Instruct patient to call for assistance before getting out of bed/chair/stretcher/Non-slip footwear applied when patient is off stretcher/[place] to call system/Physically safe environment - no spills, clutter or unnecessary equipment/Purposeful proactive rounding/Room/bathroom lighting operational, light cord in reach

## 2023-10-22 NOTE — ED ADULT NURSE NOTE - OBJECTIVE STATEMENT
patient alert and oriented x4 ambulatory, c/o fever, rash, body aches, and vaginal discharge. patient states she has a past medical history of Marysol Danlos and has been having extreme body aches and pain. patient states she is suspicious that her fiance may be getting her sick (recently diagnosed with mono with no other contacts other than fiance) and would like a full STI/HIV work-up. patient is well appearing at this time, no acute distress noted. 20G IV placed in L arm, labs sent. patient pending results at this time.

## 2023-10-22 NOTE — ED PROVIDER NOTE - OBJECTIVE STATEMENT
27yo F pmhx recent intraabdominal abscess (2 months ago) after a , ehrlers danlos, endometriosis, spinal pain on chronic opiates, postpartum preeclampsia 3 months comes in with joint pain throughout, issues ambulating, fever (tmax 100.5), sore throat. Their pain/symptom is moderate, started 1 month ago, constant, non-mediating with rest, associated with congestion, abdominal pain, dysuria. Started randomly.  Recently seen by the emergency department team and found to have mono.  Patient comes back with same symptoms requested a swab of the rectum and anus for the gonorrhea testing as she did not believe the urine test results.  Denies trauma, falls, headache, AMS, dizziness, syncope, shortness of breath, cough, rhinorrhea, nausea, vomiting, diarrhea, constipation, melena, hematochezia, hematuria, urinary frequency, flank pain, p.o. issues, issues urinating, issues stooling.    PCP: none  Pharm[facility]: [phone]

## 2023-10-22 NOTE — ED PROVIDER NOTE - CLINICAL SUMMARY MEDICAL DECISION MAKING FREE TEXT BOX
Impression: 27yo F pmhx recent intraabdominal abscess (2 months ago) after a , ehrlers danlos, endometriosis, spinal pain on chronic opiates, postpartum preeclampsia 3 months comes in with joint pain throughout, issues ambulating, fever (tmax 100.5), sore throat. Their symptoms and exam findings in the setting of their pmhx are concerning for ongoing mono, viral illness.      Ordered labs, imaging, medications for diagnosis, management, and treatment.

## 2023-10-22 NOTE — ED ADULT TRIAGE NOTE - CHIEF COMPLAINT QUOTE
pt was seen in ED yesterday c/o fever, rash, body aches, vaginal discharge.  pt left ama yesterday but today her symptoms are getting worse.  pt was recently treated for sepsis.  Hx: Elhers danlos syndrome, endometriosis, fibromyalgia

## 2023-10-22 NOTE — ED PROVIDER NOTE - NSPTACCESSSVCSAPPTDETAILS_ED_ALL_ED_FT
low grade fevers    ALSO PLEASE CONTACT PATIENT FOR RHEUMATOLOGY APPOINTMENT FOR PERSISTENT JOINT PAINS.

## 2023-10-22 NOTE — ED ADULT NURSE NOTE - HEART RATE (BEATS/MIN)
Problem: Patient Education: Go to Patient Education Activity  Goal: Patient/Family Education  Outcome: Progressing Towards Goal     Problem: Vaginal Delivery: Day of Deliver-Laboring  Goal: Off Pathway (Use only if patient is Off Pathway)  Outcome: Progressing Towards Goal  Goal: Activity/Safety  Outcome: Progressing Towards Goal  Goal: Consults, if ordered  Outcome: Progressing Towards Goal  Goal: Diagnostic Test/Procedures  Outcome: Progressing Towards Goal  Goal: Nutrition/Diet  Outcome: Progressing Towards Goal  Goal: Discharge Planning  Outcome: Progressing Towards Goal  Goal: Medications  Outcome: Progressing Towards Goal  Goal: Respiratory  Outcome: Progressing Towards Goal  Goal: Treatments/Interventions/Procedures  Outcome: Progressing Towards Goal  Goal: *Vital signs within defined limits  Outcome: Progressing Towards Goal  Goal: *Labs within defined limits  Outcome: Progressing Towards Goal  Goal: *Hemodynamically stable  Outcome: Progressing Towards Goal  Goal: *Optimal pain control at patient's stated goal  Outcome: Progressing Towards Goal     Problem: Pain  Goal: *Control of Pain  Outcome: Progressing Towards Goal     Problem: Patient Education: Go to Patient Education Activity  Goal: Patient/Family Education  Outcome: Progressing Towards Goal 85

## 2023-10-22 NOTE — ED PROVIDER NOTE - NSFOLLOWUPINSTRUCTIONS_ED_ALL_ED_FT
[facility] General Internal Medicine  General Internal Medicine  [address] [place] [address]  Phone: [phone]  Fax:     [facility]  Internal Medicine  [address] [place] [address]  Phone: [phone]  Fax: [phone] Syndrome    WHAT YOU NEED TO KNOW:    Viral syndrome is a term used for symptoms of an infection caused by a virus. Viruses are spread easily from person to person through the air and on shared items.    DISCHARGE INSTRUCTIONS:    Call your local emergency number (911 in the ) or have someone else call if:    You have a seizure.    You cannot be woken.    You have chest pain or trouble breathing.  Seek care immediately if:    You have a stiff neck, a bad headache, and sensitivity to light.    You feel weak, dizzy, or confused.    You stop urinating or urinate a lot less than usual.    You cough up blood or thick yellow or green mucus.    You have severe abdominal pain or your abdomen is larger than usual.  Call your doctor if:    Your symptoms do not get better with treatment or get worse after 3 days.    You have a rash or ear pain.    You have burning when you urinate.    You have questions or concerns about your condition or care.  Medicines: You may need any of the following:    Acetaminophen decreases pain and fever. It is available without a doctor's order. Ask how much to take and how often to take it. Follow directions. Read the labels of all other medicines you are using to see if they also contain acetaminophen, or ask your doctor or pharmacist. Acetaminophen can cause liver damage if not taken correctly. Do not use more than 4 grams (4,000 milligrams) total of acetaminophen in one day.    NSAIDs, such as ibuprofen, help decrease swelling, pain, and fever. NSAIDs can cause stomach bleeding or kidney problems in certain people. If you take blood thinner medicine, always ask your healthcare provider if NSAIDs are safe for you. Always read the medicine label and follow directions.    Cold medicine helps decrease swelling, control a cough, and relieve chest or nasal congestion.    Saline nasal spray helps decrease nasal congestion.    Take your medicine as directed. Contact your healthcare provider if you think your medicine is not helping or if you have side effects. Tell him of her if you are allergic to any medicine. Keep a list of the medicines, vitamins, and herbs you take. Include the amounts, and when and why you take them. Bring the list or the pill bottles to follow-up visits. Carry your medicine list with you in case of an emergency.  Manage your symptoms:    Drink liquids as directed to prevent dehydration. Ask how much liquid to drink each day and which liquids are best for you. Ask if you should drink an oral rehydration solution (ORS). An ORS has the right amounts of water, salts, and sugar you need to replace body fluids. This may help prevent dehydration caused by vomiting or diarrhea. Do not drink liquids with caffeine. Liquids with caffeine can make dehydration worse.    Get plenty of rest to help your body heal. Take naps throughout the day. Ask your healthcare provider when you can return to work and your normal activities.    Use a cool mist humidifier to help you breathe easier. Ask your healthcare provider how to use a cool mist humidifier.    Eat honey or use cough drops for a sore throat. Cough drops are available without a doctor's order. Follow directions for taking cough drops.    Do not smoke or be close to anyone who is smoking. Nicotine and other chemicals in cigarettes and cigars can cause lung damage. Smoking can also delay healing. Ask your healthcare provider for information if you currently smoke and need help to quit. E-cigarettes or smokeless tobacco still contain nicotine. Talk to your healthcare provider before you use these products.  Prevent the spread of germs:      Wash your hands often. Wash your hands several times each day. Wash after you use the bathroom, change a child's diaper, and before you prepare or eat food. Use soap and water every time. Rub your soapy hands together, lacing your fingers. Wash the front and back of your hands, and in between your fingers. Use the fingers of one hand to scrub under the fingernails of the other hand. Wash for at least 20 seconds. Rinse with warm, running water for several seconds. Then dry your hands with a clean towel or paper towel. Use hand  that contains alcohol if soap and water are not available. Do not touch your eyes, nose, or mouth without washing your hands first.  Handwashing      Cover a sneeze or cough. Use a tissue that covers your mouth and nose. Throw the tissue away in a trash can right away. Use the bend of your arm if a tissue is not available. Wash your hands well with soap and water or use a hand .    Stay away from others while you are sick. Avoid crowds as much as possible.    Ask about vaccines you may need. Talk to your healthcare provider about your vaccine history. He or she will tell you which vaccines you need, and when to get them.  Get the influenza (flu) vaccine as soon as recommended each year. The flu vaccine is available starting in September or October. Flu viruses change, so it is important to get a flu vaccine every year.    Get the pneumonia vaccine if recommended. This vaccine is usually recommended every 5 years. Your provider will tell you when to get this vaccine, if needed.  Follow up with your doctor as directed: Write down your questions so you remember to ask them during your visits.

## 2023-10-22 NOTE — ED PROVIDER NOTE - PATIENT PORTAL LINK FT
You can access the FollowMyHealth Patient Portal offered by [facility] by registering at the following website: [email or web address]. By joining [facility]’s FollowMyHealth portal, you will also be able to view your health information using other applications (apps) compatible with our system.

## 2023-10-22 NOTE — ED ADULT NURSE REASSESSMENT NOTE - NS ED NURSE REASSESS COMMENT FT1
Report received by day MADYSON Stephens. A&Ox4 and ambualtory. Appears sitting up in stretcher HOB elevated. C/o joint pain primarily located to bilateral hands, knees, shoulder. MD Bradford made aware. Pain medicated administered as ordered, see MAR. Speaking in full and complete sentences. No acute distress noted. Denies CP, SOB, nausea, vomiting, headache, lightheadedness, dizziness, fever or chills. Bed in lowest position, call bell in hand, wheels locked, fall precautions in effect, safety maintained. Awaiting further orders.

## 2023-10-22 NOTE — ED POST DISCHARGE NOTE - RESULT SUMMARY
Patient called for results. Discussed with patient GC/ Chlamydia, syphilis all negative. Patient aware of positive Mono. UCX still pending.

## 2023-10-23 LAB
B BURGDOR C6 AB SER-ACNC: NEGATIVE — SIGNIFICANT CHANGE UP
B BURGDOR C6 AB SER-ACNC: NEGATIVE — SIGNIFICANT CHANGE UP
B BURGDOR IGG+IGM SER-ACNC: 0.19 INDEX — SIGNIFICANT CHANGE UP (ref 0.01–0.89)
B BURGDOR IGG+IGM SER-ACNC: 0.19 INDEX — SIGNIFICANT CHANGE UP (ref 0.01–0.89)
C TRACH RRNA SPEC QL NAA+PROBE: SIGNIFICANT CHANGE UP
C TRACH+GC RRNA ANAL QL PROBE: SIGNIFICANT CHANGE UP
C TRACH+GC RRNA ANAL QL PROBE: SIGNIFICANT CHANGE UP
CHLAMYDIA/N. GONORRHEA, ANAL/RECTAL, TMA - SOURCE ANAL: SIGNIFICANT CHANGE UP
CHLAMYDIA/N. GONORRHEA, ANAL/RECTAL, TMA - SOURCE ANAL: SIGNIFICANT CHANGE UP
CULTURE RESULTS: SIGNIFICANT CHANGE UP
CULTURE RESULTS: SIGNIFICANT CHANGE UP
N GONORRHOEA RRNA SPEC QL NAA+PROBE: SIGNIFICANT CHANGE UP
SPECIMEN SOURCE: SIGNIFICANT CHANGE UP

## 2023-10-28 LAB
CULTURE RESULTS: SIGNIFICANT CHANGE UP
SPECIMEN SOURCE: SIGNIFICANT CHANGE UP

## 2023-10-29 LAB
MYCOPLASMA HOMINIS CULTURE RESULT: NEGATIVE — SIGNIFICANT CHANGE UP
MYCOPLASMA HOMINIS CULTURE RESULT: NEGATIVE — SIGNIFICANT CHANGE UP
UREAPLASMA CULTURE: NEGATIVE — SIGNIFICANT CHANGE UP
UREAPLASMA CULTURE: NEGATIVE — SIGNIFICANT CHANGE UP

## 2023-11-02 ENCOUNTER — APPOINTMENT (OUTPATIENT)
Dept: INFECTIOUS DISEASE | Facility: CLINIC | Age: 28
End: 2023-11-02

## 2023-11-10 NOTE — ED ADULT NURSE NOTE - CAS TRG GEN SKIN COLOR
PRE-OPERATIVE NOTE   Chief Complaint had concerns including Pre-Op Exam (11/30/23; RIGHT FOOT EXCISION SOFT TISSUE MASS) and Pain (Pain the on the right arm that will shoot down the hand, once the pain hits the hand it will go numb; on going for 3 days ).  Surgery Date: 11/30/2023  Planned Surgery: RIGHT FOOT EXCISION SOFT TISSUE MASS  Type of Anesthesia: Monitor Anesthesia Care  Pre-op Diagnosis: Ganglion cyst [M67.40]; Pain in right foot [M79.671]  Requesting Surgeon: Dani Talamantes DPM    Subjective   BRIEF HISTORY LEADING to SURGERY: Luis Owusu is a 36 year old female here for pre-operative anesthesia consult for surgery as requested by the surgeon.     Right chest wall pain that radiates down right arm and feels like numbness and tingling. Pain started in last 2-3 days. Pain is intermittent and just occurs suddenly. No known triggers or injury to arm or chest. She is right handed. Never had similar symptoms before. Having some trouble picking up objects. Has not tried any medication for symptoms. She does work at [employer] and needs a letter stating she can work without restrictions.     Review of Systems   Constitutional: Negative for appetite change, fatigue and fever.   Respiratory: Negative for shortness of breath.    Cardiovascular: Positive for chest pain (right chest wall radiating down arm). Negative for palpitations and leg swelling.   Gastrointestinal: Negative for abdominal pain, constipation and diarrhea.   Genitourinary: Negative for dysuria, frequency, hematuria, menstrual problem, vaginal bleeding and vaginal discharge.   Musculoskeletal: Positive for back pain (chronic). Negative for arthralgias.   Neurological: Positive for light-headedness (intermittent for 1-2 minutes, feels she gets overheated). Negative for dizziness and headaches.   Psychiatric/Behavioral: Negative for dysphoric mood and sleep disturbance. The patient is nervous/anxious (chronic).        Objective PHYSICAL  EXAM  Vitals:    11/10/23 1236   BP: 124/68   Pulse: 78   SpO2: 97%   Weight: 101.4 kg (223 lb 7 oz)   Height: 5' 5\" (1.651 m)     Physical Exam  Vitals reviewed.   Constitutional:       General: She is not in acute distress.     Appearance: She is well-developed. She is not ill-appearing.   HENT:      Right Ear: Tympanic membrane, ear canal and external ear normal.      Left Ear: Tympanic membrane, ear canal and external ear normal.      Mouth/Throat:      Lips: Pink.      Mouth: Mucous membranes are moist.      Dentition: Normal dentition.      Tongue: No lesions.      Pharynx: No oropharyngeal exudate or posterior oropharyngeal erythema.      Tonsils: No tonsillar exudate. 1+ on the right. 1+ on the left.      Neck: Full passive range of motion without pain, normal range of motion and neck supple.   Eyes:      Pupils: Pupils are equal, round, and reactive to light.   Neck:      Thyroid: No thyromegaly or thyroid tenderness.   Cardiovascular:      Rate and Rhythm: Normal rate and regular rhythm.      Heart sounds: Normal heart sounds. No murmur heard.  Pulmonary:      Effort: Pulmonary effort is normal. No respiratory distress.      Breath sounds: Normal breath sounds.   Chest:       Abdominal:      General: Bowel sounds are normal. There is no distension.      Palpations: Abdomen is soft.      Tenderness: There is no abdominal tenderness.   Musculoskeletal:      Right shoulder: Normal.      Right lower leg: No tenderness. No edema.      Left lower leg: No tenderness. No edema.   Lymphadenopathy:      Cervical: No cervical adenopathy.      Right cervical: No superficial cervical adenopathy.     Left cervical: No superficial cervical adenopathy.   Skin:     General: Skin is warm and dry.   Neurological:      Mental Status: She is alert and oriented to person, place, and time.   Psychiatric:         Mood and Affect: Mood normal.         Behavior: Behavior normal. Behavior is cooperative.         Thought Content:  Thought content normal.         Judgment: Judgment normal.           RESULTS REVIEW     Most Recent  Na+  142 (1/7/2022)  K+  3.5 (1/7/2022)   Glucose  88 (1/7/2022)  GFR  >90 (1/7/2022)        Most Recent  WBC  9.7 (1/7/2022)  HGB  13.3 (1/7/2022)     Platelet  307 (1/7/2022)              Encounter Date: 11/10/23   Electrocardiogram 12-Lead    Narrative    Normal sinus rhythm  Normal ECG    Impression    Agree with interpretation. ARABELLA Miguel              SURGICAL RISK AND SCREENINGS     Family History Risks  Adverse Reaction to Anesthesia: No  Bleeding or Blood Disorder: No  Malignant Hyperthermia: No    Personal Surgical History  Anesthetic Complications: No  Bleeding Problems: No  Problems with Surgery: No         Malnutrition Screening Tool Current weight 223 pounds  Have you recently lost weight without trying? No  Have you been eating poorly because of a decreased appetite? No  Score = 0, Not at Risk       Functional Capacity  Are you able to do light housework, dusting, wash dishes, climb a flight of stairs or walk up a hill without chest pain or problems breathing (>4 METS)? Yes    Revised Cardiac Risk Index  Intermediate or Higher Risk Surgery No   History of Ischemic Heart Disease or Cardiac Chest Pain No   Congestive Heart Failure No;     History of Stroke or TIA No   Last Creatinine >2 No; 0.64 g/dL (01/07/2022)   Prescribed Insulin No   Estimated Risk of a Major Cardiac Complication 0 risk factors = 3.9%     Acute Kidney Injury Prevention:  Patient has no known OLIVIA risk factors.    Advance care planning documents on file - no    ASSESSMENT AND PLAN       1. Pre-op evaluation  -     CBC with Automated Differential; Future  -     Comprehensive Metabolic Panel; Future  2. Right-sided chest wall pain  -     Electrocardiogram 12-Lead    Preop Optimization  - OPTIMIZED for SURGERY: YES patient is medically optimized for surgery pending test results.  - Workup reviewed and/or ordered: See  Orders  - Pre-Op management of pacemaker, dialysis or steroids: not needed.  - Post-Op DVT prophylaxis: per surgical team  - Smoking Status: Former Smoker.     Pertinent Conditions       Medications to Hold   Medications and Supplements:  - Morning of surgery hold any Multivitamins AND for 2 weeks hold any Vitamin E or Herbals   - Morning of surgery hold (cyclobenzaprine (FLEXERIL) 10 MG tablet [[id number]])    Anticoagulation:none on med list    Antidiabetic:none on med list    OLIVIA Risk (Diuretics, ACE-I/ARB, NSAIDs):   - 7 days before surgery hold (naproxen (NAPROSYN) 500 MG tablet [[id number]])      Continue all other medications unless an alternative plan was advised with the surgeon and/or specialist.    Suspect chest wall pain is musculoskeletal as pain is reproducible on exam.  EKG showed normal sinus rhythm.  Discussed trying naproxen as needed for the pain.  She will notify me if she is still having pain by the end of next week. Can also use ice or heat to the area.  Can also use topical analgesic.  If she has worsening chest wall pain, chest pain, palpitations, shortness of breath, dizziness, she should be re-evaluated in the ED.    Will notify of lab results and proceed based on results.    Follow Up  Return in about 3 months (around 2/10/2024) for Annual with pap.    ARABELLA Miguel  Copy sent to: Dani Talamantes DPM          Normal for race

## 2024-03-18 NOTE — DISCHARGE NOTE PROVIDER - NSDCCONDITION_GEN_ALL_CORE
CHW - Follow Up    Lina Espinoza, Community Health Worker completed a follow up visit with patient via telephone today.  Pt/Caregiver reported: Ms. Casimiro Mar reported that her daughter  last week.   Community Health Worker provided: CHW told Ms. Casimiro Mar that she has her deepest sympathy and let CHW know if she needs anything.   Follow-up Outreach - Due: 3/25/2024     
Stable

## 2024-04-13 ENCOUNTER — NON-APPOINTMENT (OUTPATIENT)
Age: 29
End: 2024-04-13

## 2024-05-31 NOTE — ED PROVIDER NOTE - PHYSICAL EXAMINATION
spontaneous/unlabored 26y F with PMHx of Stage-4 endometriosis and fibromyalgia, abdominoplasty on 6/2/22, and had blood transfusion yesterday at [facility], presents to the ED with black tarry stools starting today. Patient notes new bruising on bilateral thighs. Also endorses SOB, dizziness, ringing in the ears, feels that she cannot form coherent sentences.  Last took dose of Lovenox yesterday. Patient is taking oxycodone, Dilaudid and gabapentin for pain. Is on Clindamycin and Cyclobenzaprine. Has been taking iron.

## 2024-07-19 NOTE — ED ADULT NURSE NOTE - CAS EDN DISCHARGE ASSESSMENT
Pt resting quietly. No adverse effects noted. Resp even, unlabored. Family at bedside. Pt awaiting transport to OR.     
Alert and oriented to person, place and time

## 2024-08-20 NOTE — PRE-OP CHECKLIST - HAND OFF
"Caller: Johnna Contreras \"Sarah\"    Relationship: Self    Best call back number:[phone]     What is the medical concern/diagnosis: FALLS    What specialty or service is being requested: HOME HEALTH NURSING AND THERAPY    What is the provider, practice or medical service name: [facility]    What is the office phone number: [phone]          "
yes

## 2024-11-18 NOTE — ED ADULT NURSE NOTE - BREATH SOUNDS, MLM
Primary care office Note      Name: Kalyan Armstrong, Age: 74 y.o., Gender: male, MRN: [id number]   Pharmacy:   [facility][id number] - [address] AT [facility] [address]  [address] [facility] [address]  Phone: [phone] Fax: [phone]    [id number] - [place], [place] - [address] [facility]  [address] [facility] [address]  Phone: [phone] Fax: [phone]     [facility]  [address], [address]  [facility] [address]  Phone: [phone] Fax: [phone]     PCP: Rosa Lees        Subjective:   Chief Complaint   Patient presents with    Medicare Annual Wellness Visit Initial     Declined flu vaccine        Kalyan Armstrong is a 74 y.o. male who presented to the clinic today for follow up     HPI:   Kalyan Armstrong is a 74 y.o. male  Pt has had significant weight loss (~20 lbs) since his last visit on 10/9/24. Pt was in the hospital and since discharge has not been eating well. He states that things don't taste good and decreased appetite.   Would like to try something to increase his appetite.    He also had a fall and injured his elbow. He has an area that has been draining serosanguinous fluid and a small piece of tissue hanging off his elbow.    Nephrology would like patient to have an EGD for his weight loss and decreased appetite.       The patient denies headaches, lightheadedness, changes in speech/vision, photophobia, dysphagia, diaphoresis, Chest pain, dyspnea, Abdominal pain, recent falls or trauma, and changes in bowel/urinary habits.     ROS:   12 points review of system is negative excepted as stated above in the HPI.    Medical History      PMH:    has a past medical history of CATHIE (acute kidney injury) (CMS-HCC) (05/03/2023), Cataract, COPD (chronic obstructive pulmonary disease) (Multi), Cough, unspecified (06/20/2014), Emphysema of lung (Multi), Encounter for immunization (01/19/2015), Encounter for screening for malignant  neoplasm of prostate, Other conditions influencing health status (2013), Personal history of colonic polyps, Personal history of other specified conditions (2013), and Personal history of other specified conditions (2014).   Allergies:   No Known Allergies   Surgical Hx:   Past Surgical History:   Procedure Laterality Date    APPENDECTOMY  2013    Appendectomy    COLONOSCOPY  2013    Complete Colonoscopy    EYE SURGERY        Social HX:   Social History     Tobacco Use    Smoking status: Former     Current packs/day: 0.00     Types: Cigarettes     Start date:      Quit date:      Years since quittin.8     Passive exposure: Past    Smokeless tobacco: Never   Substance Use Topics    Alcohol use: Yes     Comment: 1 cup of scotch a week    Drug use: Never     Comment: Former drug user, has not used any in 50 years        MEDS:   Current Outpatient Medications   Medication Instructions    acetaminophen (TYLENOL) 325 mg, oral, Every 4 hours PRN    albuterol 2.5 mg /3 mL (0.083 %) nebulizer solution nebulization, 4 times daily PRN, 1 vial    albuterol 90 mcg/actuation inhaler 1 puff, inhalation, Every 4 hours PRN, Every 4-6 hrs prn    amLODIPine (NORVASC) 10 mg, oral, Daily    azithromycin (Zithromax) 250 mg tablet One PO every day    budesonide (PULMICORT) 0.5 mg, nebulization, 2 times daily RT    cholecalciferol (VITAMIN D-3) 50,000 Units, oral, Once Weekly    fluticasone-umeclidin-vilanter (Trelegy Ellipta) 100-62.5-25 mcg blister with device 1 puff, inhalation, Daily RT    mirtazapine (REMERON) 7.5 mg, oral, Nightly    predniSONE (Deltasone) 10 mg tablet Take 4 tablets by mouth once daily for 1 day, then take 3 tablets by mouth once daily for 2 days, then take 2 tablets by mouth once daily for 2 days, then take 1 tablet by mouth once daily for 2 days, then take 0.5 tablets by mouth once daily going forward    rosuvastatin (CRESTOR) 40 mg, oral, Daily    triamcinolone (Kenalog)  0.1 % cream Topical, 2 times daily, Apply to affected area 1-2 times daily as needed.        Objective Data     Objective:   Visit Vitals  /75 (BP Location: Right arm, Patient Position: Sitting, BP Cuff Size: Small adult)   Pulse (!) 118        Physical Examination:   GE; sitting comfortably in a chair, with NC O2 on, winded appearing, hard of hearing.  HEENT; AT/NC, no conjunctival pallor, anicteric sclera  Neck; Supple neck, no LAD/JVD  Chest; CTAbl, no adventitious sounds  CVS; s1 and s2 only no MGR, RRR  Ext; no cyanosis/clubbing/edema, pulses intact, right elbow with tissue hanging off of it  Neuro; Aox3  Psych; normal mood     Last Labs:   CBC:   WBC   Date Value Ref Range Status   10/24/2024 6.8 4.4 - 11.3 x10*3/uL Final   09/25/2024 7.2 4.4 - 11.3 x10*3/uL Final   09/24/2024 10.7 4.4 - 11.3 x10*3/uL Final     Hemoglobin   Date Value Ref Range Status   10/24/2024 10.3 (L) 13.5 - 17.5 g/dL Final   09/25/2024 9.9 (L) 13.5 - 17.5 g/dL Final   09/24/2024 11.7 (L) 13.5 - 17.5 g/dL Final     MCV   Date Value Ref Range Status   10/24/2024 87 80 - 100 fL Final   09/25/2024 84 80 - 100 fL Final   09/24/2024 83 80 - 100 fL Final     Platelets   Date Value Ref Range Status   10/24/2024 319 150 - 450 x10*3/uL Final   09/25/2024 241 150 - 450 x10*3/uL Final   09/24/2024 284 150 - 450 x10*3/uL Final      CMP:   Sodium   Date Value Ref Range Status   11/18/2024 136 136 - 145 mmol/L Final   10/24/2024 136 136 - 145 mmol/L Final   10/02/2024 135 (L) 136 - 145 mmol/L Final     Potassium   Date Value Ref Range Status   11/18/2024 4.8 3.5 - 5.3 mmol/L Final   10/24/2024 4.1 3.5 - 5.3 mmol/L Final   10/02/2024 4.1 3.5 - 5.3 mmol/L Final     Chloride   Date Value Ref Range Status   11/18/2024 94 (L) 98 - 107 mmol/L Final   10/24/2024 94 (L) 98 - 107 mmol/L Final   10/02/2024 95 (L) 98 - 107 mmol/L Final     Urea Nitrogen   Date Value Ref Range Status   11/18/2024 40 (H) 6 - 23 mg/dL Final   10/24/2024 27 (H) 6 - 23 mg/dL  Final   10/02/2024 38 (H) 6 - 23 mg/dL Final     Creatinine   Date Value Ref Range Status   11/18/2024 6.02 (H) 0.50 - 1.30 mg/dL Final   10/24/2024 4.21 (H) 0.50 - 1.30 mg/dL Final   10/02/2024 2.32 (H) 0.50 - 1.30 mg/dL Final     eGFR   Date Value Ref Range Status   11/18/2024 9 (L) >60 mL/min/1.73m*2 Final     Comment:     Calculations of estimated GFR are performed using the 2021 CKD-EPI Study Refit equation without the race variable for the IDMS-Traceable creatinine methods.  https://jasn.asnjournals.org/content/early/2021/09/22/ASN.1907450207   10/24/2024 14 (L) >60 mL/min/1.73m*2 Final     Comment:     Calculations of estimated GFR are performed using the 2021 CKD-EPI Study Refit equation without the race variable for the IDMS-Traceable creatinine methods.  https://jasn.asnjournals.org/content/early/2021/09/22/ASN.3843451777   10/02/2024 29 (L) >60 mL/min/1.73m*2 Final     Comment:     Calculations of estimated GFR are performed using the 2021 CKD-EPI Study Refit equation without the race variable for the IDMS-Traceable creatinine methods.  https://jasn.asnjournals.org/content/early/2021/09/22/ASN.7997755795      A1c:   Hemoglobin A1C   Date Value Ref Range Status   08/21/2024 5.3 see below % Final   06/30/2022 5.3 % Final     Comment:          Diagnosis of Diabetes-Adults   Non-Diabetic: < or = 5.6%   Increased risk for developing diabetes: 5.7-6.4%   Diagnostic of diabetes: > or = 6.5%  .       Monitoring of Diabetes                Age (y)     Therapeutic Goal (%)   Adults:          >18           <7.0   Pediatrics:    13-18           <7.5                   7-12           <8.0                   0- 6            7.5-8.5   American Diabetes Association. Diabetes Care 33(S1), Jan 2010.     08/02/2019 4.9 % Final     Comment:          Diagnosis of Diabetes-Adults   Non-Diabetic: < or = 5.6%   Increased risk for developing diabetes: 5.7-6.4%   Diagnostic of diabetes: > or = 6.5%  .       Monitoring of Diabetes                 Age (y)     Therapeutic Goal (%)   Adults:          >18           <7.0   Pediatrics:    13-18           <7.5                   7-12           <8.0                   0- 6            7.5-8.5   American Diabetes Association. Diabetes Care 33(S1), Jan 2010.          Other labs;   Vit D:   Vitamin D, 25-Hydroxy, Total   Date Value Ref Range Status   08/21/2024 25 (L) 30 - 100 ng/mL Final     Vitamin D, 25-Hydroxy   Date Value Ref Range Status   06/30/2022 16 (A) ng/mL Final     Comment:     .  DEFICIENCY:         < 20   NG/ML  INSUFFICIENCY:      20-29  NG/ML  SUFFICIENCY:         NG/ML    THIS ASSAY ACCURATELY QUANTIFIES THE SUM OF  VITAMIN D3, 25-HYDROXY AND VIT D2,25-HYDROXY.     04/20/2022 24 (A) ng/mL Final     Comment:     .  DEFICIENCY:         < 20   NG/ML  INSUFFICIENCY:      20-29  NG/ML  SUFFICIENCY:         NG/ML    THIS ASSAY ACCURATELY QUANTIFIES THE SUM OF  VITAMIN D3, 25-HYDROXY AND VIT D2,25-HYDROXY.        TSH:  Thyroid Stimulating Hormone   Date Value Ref Range Status   08/21/2024 2.07 0.44 - 3.98 mIU/L Final     TSH   Date Value Ref Range Status   06/30/2022 1.28 0.44 - 3.98 mIU/L Final     Comment:      TSH testing is performed using different testing    methodology at [facility] than at other    [facility]. Direct result comparisons should    only be made within the same method.     02/08/2022 0.88 0.44 - 3.98 mIU/L Final     Comment:      TSH testing is performed using different testing    methodology at [facility] than at other    [facility]. Direct result comparisons should    only be made within the same method.          Assessment and Plan     Problem List Items Addressed This Visit       Weight loss    Relevant Medications    mirtazapine (Remeron) 7.5 mg tablet    Other Relevant Orders    Referral to Gastroenterology     Other Visit Diagnoses       General medical exam    -  Primary    Stage 4 chronic kidney disease (Multi)         Relevant Orders    Comprehensive Metabolic Panel (Completed)    Decreased appetite        Relevant Medications    mirtazapine (Remeron) 7.5 mg tablet    Other Relevant Orders    Referral to Gastroenterology    Injury of right elbow, subsequent encounter        Relevant Orders    Referral to Wound Clinic            Rosa Lees DO        Clear

## 2025-01-08 NOTE — DISCHARGE NOTE NURSING/CASE MANAGEMENT/SOCIAL WORK - NSPROEXTENSIONSOFSELF_GEN_A_NUR
Discussed with Dr. Cheek who states a staple was probably placed due to bleeding from the pin that was attached to the device to immobilize her head during surgery. This can be removed. Notified Yulia of this. She requests an order be faxed to [phone] for the removal. This was completed and confirmation received.    none

## 2025-02-01 NOTE — PATIENT PROFILE ADULT - INFORMATION PROVIDED TO:
Endotracheal Intubation    DOS: 2/1/2025  Time: 4:00 am   Dx: 1.Acute Respiratory Failure , 2. Angioedema  Performed by: Self  Consent: Verbal consent obtained from patient and wife at bedside.     A time-out was completed verifying correct patient, procedure, site, positioning, and special equipment if applicable.  The patient was placed into a ramped position for maximum glottic exposure.  Method: Oral tracheal - Awake fibrooptics   Airway device: Bronchoscope  Endotracheal tube: 7.5  Sedation: Fentanyl 200mg and Ketamine 200 mg  Paralytic: Succinylcholine 160 mg  Cormack-Lehane view: IV  Confirmation: bilateral breath sounds colormetric CO2 detector end tidal CO2 detector (capnography) absence of sounds over the stomach ET tube fogging symmetrical and adequate chest rise adequate/improved pulse oximetry reading improved skin color    After confirmation of endotracheal intubation, the tube was secured to the patient at 28 cm to the teeth.  The procedure was tolerated well.  Complications: None   patient

## 2025-03-03 NOTE — PATIENT PROFILE ADULT - NSTOBACCO TYPE_GEN_A_CORE_RD
Patients GI provider:  Dr. Baptiste    Number to return call: [phone]    Reason for call: Pt calling to ask that you view message she sent on 2/10/25.  Pt would like a call back if possible to discuss.     Scheduled procedure/appointment date if applicable: N/A     Cigarettes

## 2025-05-16 ENCOUNTER — EMERGENCY (EMERGENCY)
Facility: HOSPITAL | Age: 30
LOS: 1 days | End: 2025-05-16
Attending: EMERGENCY MEDICINE | Admitting: EMERGENCY MEDICINE
Payer: COMMERCIAL

## 2025-05-16 VITALS
RESPIRATION RATE: 17 BRPM | SYSTOLIC BLOOD PRESSURE: 126 MMHG | HEART RATE: 87 BPM | DIASTOLIC BLOOD PRESSURE: 90 MMHG | TEMPERATURE: 98 F | OXYGEN SATURATION: 100 %

## 2025-05-16 DIAGNOSIS — Z98.89 OTHER SPECIFIED POSTPROCEDURAL STATES: Chronic | ICD-10-CM

## 2025-05-16 DIAGNOSIS — Z98.890 OTHER SPECIFIED POSTPROCEDURAL STATES: Chronic | ICD-10-CM

## 2025-05-16 PROCEDURE — 71046 X-RAY EXAM CHEST 2 VIEWS: CPT | Mod: 26

## 2025-05-16 PROCEDURE — 99284 EMERGENCY DEPT VISIT MOD MDM: CPT

## 2025-05-16 NOTE — ED PROVIDER NOTE - ATTENDING CONTRIBUTION TO CARE
Agree with resident note  29-year-old female with history of Marysol-Danlos loss, Grupo cyst, chronic back pain presents with left breast pain for over 1 year.  Patient relates history of having an incidental mass noted in the left breast approximately a year ago.  States received ultrasound and mammograms with inconclusive results.  Does not appear to have had a biopsy.  States no change in mass but would like to get her health in order.  So presents to the emergency room due to OB/GYN retiring and unable to get outpatient OB/GYN appointment.  Patient denies systemic symptoms such as weight loss or night sweats.  States mass has not enlarged.  Physical exam  Well-appearing female anxious  Vital signs able  Clear to auscultation bilaterally  Chest small mobile approximately 3 x 3 mass on the left breast no redness no discharge no fluctuance  Impression  Patient in need of a mammogram and further care will expedite appointment with OB/GYN, patient is aware needs a mammogram and to return to emergency room if any problem with continuity of care

## 2025-05-16 NOTE — ED PROVIDER NOTE - PATIENT PORTAL LINK FT
You can access the FollowMyHealth Patient Portal offered by [facility] by registering at the following website: [email or web address]. By joining [facility]’s FollowMyHealth portal, you will also be able to view your health information using other applications (apps) compatible with our system.

## 2025-05-16 NOTE — ED ADULT TRIAGE NOTE - CHIEF COMPLAINT QUOTE
pt c/o L breast with changes in nipple and temperature L>R, pt states she was told they MD found a "lump".

## 2025-05-16 NOTE — ED PROVIDER NOTE - PHYSICAL EXAMINATION
General: well appearing, interactive, well nourished, no apparent distress, ncat  HEENT: EOMI, PERRLA, normal mucosa, normal oropharynx, no lesions on the lips or on oral mucosa, normal external ear  Neck: supple, no lymphadenopathy, full range of motion, no nuchal rigidity  CV: RRR, normal S1 and S2 with no murmur, capillary refill less than two seconds  Resp: lungs CTA b/l, good aeration bilaterally, symmetric chest wall   Abd: non-distended, soft, non-tender  : no CVA tenderness  MSK: full range of motion, no cyanosis, no edema, no clubbing, no immobility  Neuro: CN II-XII grossly intact, muscle strength 5/5 in all extremities, normal gait  Skin: no rashes, skin intact  Breast Exam: left sided breast mass with .5x.5cm . mobile LUQ.

## 2025-05-16 NOTE — ED PROVIDER NOTE - NSFOLLOWUPINSTRUCTIONS_ED_ALL_ED_FT
- You were seen in the emergency department today for breast mass.    - Lab and imaging results, if performed, were discussed with you along with your discharge diagnosis    - Follow up with your doctor in 1 week. We will     - Return to the ED for any new, worsening, or concerning symptoms to you.    - Continue all prescribed medications.    - Take ibuprofen/tylenol as directed as needed for pain.     - Rest and keep yourself hydrated with fluids.

## 2025-05-16 NOTE — ED PROVIDER NOTE - OBJECTIVE STATEMENT
30 yo with ehrlers danlos, endometriosis, spinal pain on chronic opiates  here for chief complaint of left breast pain for almost 1 year.  Patient states that she had a incidental mediastinal mass removal last year where they found a incidental finding of a left breast mass.  At the time she was referred to see an OB gynecologist for which she was following with and received 2 ultrasound and 1 mammogram.  She was not able to complete the workup due to her gynecologist leaving the office and has had trouble following up.  Patient is here today for persistent left breast pain associated with subjective fevers and left breast enlargement.

## 2025-05-16 NOTE — ED PROVIDER NOTE - NSFOLLOWUPCLINICS_GEN_ALL_ED_FT
[facility]  OB/GYN & Surg  [address] [place] [address]  Phone: [phone]  Fax:

## 2025-05-16 NOTE — ED PROVIDER NOTE - CLINICAL SUMMARY MEDICAL DECISION MAKING FREE TEXT BOX
28 yo with ehrlers danlos, endometriosis, spinal pain on chronic opiates  here for chief complaint of left breast pain for almost 1 year. DDx includes fibroadenoma vs malignancy. Will obtain xray of chest to assess for masses and will have patient get expedited follow up.
